# Patient Record
Sex: MALE | Race: WHITE | NOT HISPANIC OR LATINO | Employment: OTHER | URBAN - METROPOLITAN AREA
[De-identification: names, ages, dates, MRNs, and addresses within clinical notes are randomized per-mention and may not be internally consistent; named-entity substitution may affect disease eponyms.]

---

## 2022-01-06 ENCOUNTER — OUTSIDE PLACE OF SERVICE (OUTPATIENT)
Dept: FAMILY MEDICINE | Facility: CLINIC | Age: 57
End: 2022-01-06
Payer: OTHER GOVERNMENT

## 2022-01-06 PROCEDURE — 99305 PR NURSING FACILITY CARE, INIT, MOD SEVERITY: ICD-10-PCS | Mod: ,,, | Performed by: FAMILY MEDICINE

## 2022-01-06 PROCEDURE — 99305 1ST NF CARE MODERATE MDM 35: CPT | Mod: ,,, | Performed by: FAMILY MEDICINE

## 2022-02-03 ENCOUNTER — OUTSIDE PLACE OF SERVICE (OUTPATIENT)
Dept: FAMILY MEDICINE | Facility: CLINIC | Age: 57
End: 2022-02-03
Payer: OTHER GOVERNMENT

## 2022-02-03 PROCEDURE — 99308 SBSQ NF CARE LOW MDM 20: CPT | Mod: ,,, | Performed by: FAMILY MEDICINE

## 2022-02-03 PROCEDURE — 99308 PR NURSING FAC CARE, SUBSEQ, MINOR COMPLIC: ICD-10-PCS | Mod: ,,, | Performed by: FAMILY MEDICINE

## 2022-02-16 ENCOUNTER — OUTSIDE PLACE OF SERVICE (OUTPATIENT)
Dept: FAMILY MEDICINE | Facility: CLINIC | Age: 57
End: 2022-02-16
Payer: OTHER GOVERNMENT

## 2022-02-16 PROCEDURE — 99307 PR NURSING FAC CARE, SUBSEQ, IMPROVING: ICD-10-PCS | Mod: ,,, | Performed by: FAMILY MEDICINE

## 2022-02-16 PROCEDURE — 99307 SBSQ NF CARE SF MDM 10: CPT | Mod: ,,, | Performed by: FAMILY MEDICINE

## 2022-03-03 ENCOUNTER — OUTSIDE PLACE OF SERVICE (OUTPATIENT)
Dept: FAMILY MEDICINE | Facility: CLINIC | Age: 57
End: 2022-03-03
Payer: OTHER GOVERNMENT

## 2022-03-03 PROCEDURE — 99307 PR NURSING FAC CARE, SUBSEQ, IMPROVING: ICD-10-PCS | Mod: ,,, | Performed by: FAMILY MEDICINE

## 2022-03-03 PROCEDURE — 99307 SBSQ NF CARE SF MDM 10: CPT | Mod: ,,, | Performed by: FAMILY MEDICINE

## 2022-06-02 ENCOUNTER — OUTSIDE PLACE OF SERVICE (OUTPATIENT)
Dept: FAMILY MEDICINE | Facility: CLINIC | Age: 57
End: 2022-06-02
Payer: OTHER GOVERNMENT

## 2022-06-02 PROCEDURE — 99308 PR NURSING FAC CARE, SUBSEQ, MINOR COMPLIC: ICD-10-PCS | Mod: ,,, | Performed by: FAMILY MEDICINE

## 2022-06-02 PROCEDURE — 99308 SBSQ NF CARE LOW MDM 20: CPT | Mod: ,,, | Performed by: FAMILY MEDICINE

## 2022-06-07 ENCOUNTER — TELEPHONE (OUTPATIENT)
Dept: FAMILY MEDICINE | Facility: CLINIC | Age: 57
End: 2022-06-07
Payer: OTHER GOVERNMENT

## 2022-06-14 DIAGNOSIS — R47.01 APHASIA: ICD-10-CM

## 2022-06-14 DIAGNOSIS — I63.9 STROKE: Primary | ICD-10-CM

## 2022-06-20 ENCOUNTER — TELEPHONE (OUTPATIENT)
Dept: VASCULAR SURGERY | Facility: CLINIC | Age: 57
End: 2022-06-20
Payer: OTHER GOVERNMENT

## 2022-06-20 DIAGNOSIS — I65.23 BILATERAL CAROTID ARTERY STENOSIS: Primary | ICD-10-CM

## 2022-06-20 NOTE — TELEPHONE ENCOUNTER
Contacted patient's sister in reference to appt scheduled with Dr. Servin. [facility] patient had stroke in May and was found down in home four days after stroke. He needs visits with cardiology, vascular surgery, and vascular neurology. Appt scheduled for [facility], sister verified. Offered sooner availability. [facility] pt is currently in 's home in [place] and is supposed to be discharged this Friday. [facility] she would like to have patient at home for a period of time to rest and get re acclimated to his home before going to appts outside of the home, but will call for sooner appt if anything changes. [facility] patient has blockages in brain as well and needs to be seen for this. Explained that this is handled by vascular neurology and that nurse can transfer call to appropriate dept. Contact information given for vascular surgery clinic and call transferred.

## 2022-06-21 ENCOUNTER — TELEPHONE (OUTPATIENT)
Dept: NEUROLOGY | Facility: CLINIC | Age: 57
End: 2022-06-21
Payer: OTHER GOVERNMENT

## 2022-06-21 NOTE — TELEPHONE ENCOUNTER
----- Message from Karolyn Lopez sent at 6/21/2022  9:31 AM CDT -----  Contact: Elena GARCIA Pt's sisters requesting a call back regarding scheduling... Would like to be seen July 11th if possible.      Confirmed contact info below:  Contact Name: Adarsh JIGNESH Wheeler  Phone Number: [phone]

## 2022-06-22 ENCOUNTER — TELEPHONE (OUTPATIENT)
Dept: NEUROLOGY | Facility: CLINIC | Age: 57
End: 2022-06-22

## 2022-06-22 NOTE — TELEPHONE ENCOUNTER
Appt booked 07/11/2022 with Dr. Mendiola because sister requested same day appts as other appts (Had taking off work, she's paying his bills and hers).

## 2022-06-22 NOTE — TELEPHONE ENCOUNTER
----- Message from Miladys Barbosa MA sent at 6/20/2022  5:01 PM CDT -----  Contact: Elena (sister) [phone]    ----- Message -----  From: Augusta Mckeon  Sent: 6/20/2022   4:46 PM CDT  To: , #    Caller (Elena-sister) requesting a call back to schedule her brother an appt with Dr. Ignacio per Dr. Servin request.  Pt had a massive stroke on Dec 8, 2022.  Please call to discuss further.

## 2022-07-11 ENCOUNTER — OFFICE VISIT (OUTPATIENT)
Dept: NEUROLOGY | Facility: CLINIC | Age: 57
End: 2022-07-11
Payer: OTHER GOVERNMENT

## 2022-07-11 ENCOUNTER — OFFICE VISIT (OUTPATIENT)
Dept: INTERNAL MEDICINE | Facility: CLINIC | Age: 57
End: 2022-07-11
Payer: OTHER GOVERNMENT

## 2022-07-11 VITALS — HEART RATE: 73 BPM | WEIGHT: 180.75 LBS | DIASTOLIC BLOOD PRESSURE: 76 MMHG | SYSTOLIC BLOOD PRESSURE: 130 MMHG

## 2022-07-11 VITALS
WEIGHT: 181 LBS | BODY MASS INDEX: 27.43 KG/M2 | TEMPERATURE: 98 F | HEIGHT: 68 IN | RESPIRATION RATE: 18 BRPM | HEART RATE: 77 BPM | DIASTOLIC BLOOD PRESSURE: 80 MMHG | SYSTOLIC BLOOD PRESSURE: 118 MMHG | OXYGEN SATURATION: 95 %

## 2022-07-11 DIAGNOSIS — Z86.73 HISTORY OF STROKE: Primary | ICD-10-CM

## 2022-07-11 DIAGNOSIS — M79.2 NEUROPATHIC PAIN: ICD-10-CM

## 2022-07-11 DIAGNOSIS — E78.2 MIXED HYPERLIPIDEMIA: ICD-10-CM

## 2022-07-11 DIAGNOSIS — I63.9 LEFT-SIDED CEREBROVASCULAR ACCIDENT (CVA): Primary | ICD-10-CM

## 2022-07-11 DIAGNOSIS — Z13.6 ENCOUNTER FOR SCREENING FOR CARDIOVASCULAR DISORDERS: ICD-10-CM

## 2022-07-11 DIAGNOSIS — R53.1 RIGHT SIDED WEAKNESS: ICD-10-CM

## 2022-07-11 DIAGNOSIS — I11.9 HYPERTENSIVE HEART DISEASE WITHOUT HEART FAILURE: ICD-10-CM

## 2022-07-11 DIAGNOSIS — I63.032: ICD-10-CM

## 2022-07-11 PROBLEM — I10 BENIGN ESSENTIAL HYPERTENSION: Status: ACTIVE | Noted: 2022-07-11

## 2022-07-11 PROCEDURE — 99214 PR OFFICE/OUTPT VISIT, EST, LEVL IV, 30-39 MIN: ICD-10-PCS | Mod: S$PBB,,, | Performed by: INTERNAL MEDICINE

## 2022-07-11 PROCEDURE — 99214 OFFICE O/P EST MOD 30 MIN: CPT | Mod: PBBFAC | Performed by: INTERNAL MEDICINE

## 2022-07-11 PROCEDURE — 99999 PR PBB SHADOW E&M-EST. PATIENT-LVL IV: CPT | Mod: PBBFAC,,, | Performed by: INTERNAL MEDICINE

## 2022-07-11 PROCEDURE — 99999 PR PBB SHADOW E&M-EST. PATIENT-LVL III: CPT | Mod: PBBFAC,,, | Performed by: STUDENT IN AN ORGANIZED HEALTH CARE EDUCATION/TRAINING PROGRAM

## 2022-07-11 PROCEDURE — 99214 OFFICE O/P EST MOD 30 MIN: CPT | Mod: S$PBB,,, | Performed by: INTERNAL MEDICINE

## 2022-07-11 PROCEDURE — 99204 PR OFFICE/OUTPT VISIT, NEW, LEVL IV, 45-59 MIN: ICD-10-PCS | Mod: S$PBB,,, | Performed by: STUDENT IN AN ORGANIZED HEALTH CARE EDUCATION/TRAINING PROGRAM

## 2022-07-11 PROCEDURE — 99999 PR PBB SHADOW E&M-EST. PATIENT-LVL III: ICD-10-PCS | Mod: PBBFAC,,, | Performed by: STUDENT IN AN ORGANIZED HEALTH CARE EDUCATION/TRAINING PROGRAM

## 2022-07-11 PROCEDURE — 99204 OFFICE O/P NEW MOD 45 MIN: CPT | Mod: S$PBB,,, | Performed by: STUDENT IN AN ORGANIZED HEALTH CARE EDUCATION/TRAINING PROGRAM

## 2022-07-11 PROCEDURE — 99999 PR PBB SHADOW E&M-EST. PATIENT-LVL IV: ICD-10-PCS | Mod: PBBFAC,,, | Performed by: INTERNAL MEDICINE

## 2022-07-11 PROCEDURE — 99213 OFFICE O/P EST LOW 20 MIN: CPT | Mod: PBBFAC,27 | Performed by: STUDENT IN AN ORGANIZED HEALTH CARE EDUCATION/TRAINING PROGRAM

## 2022-07-11 RX ORDER — DULOXETIN HYDROCHLORIDE 30 MG/1
30 CAPSULE, DELAYED RELEASE ORAL DAILY
Qty: 90 CAPSULE | Refills: 3 | Status: SHIPPED | OUTPATIENT
Start: 2022-07-11 | End: 2022-07-18 | Stop reason: SDUPTHER

## 2022-07-11 RX ORDER — ONDANSETRON 4 MG/1
1 TABLET, FILM COATED ORAL EVERY 6 HOURS PRN
COMMUNITY
Start: 2022-07-08 | End: 2024-03-06

## 2022-07-11 RX ORDER — ROSUVASTATIN CALCIUM 40 MG/1
1 TABLET, COATED ORAL DAILY
COMMUNITY
Start: 2022-07-07 | End: 2022-07-18 | Stop reason: SDUPTHER

## 2022-07-11 RX ORDER — DULOXETIN HYDROCHLORIDE 30 MG/1
1 CAPSULE, DELAYED RELEASE ORAL DAILY
COMMUNITY
Start: 2022-07-07 | End: 2022-07-11 | Stop reason: SDUPTHER

## 2022-07-11 RX ORDER — LISINOPRIL 2.5 MG/1
2.5 TABLET ORAL
COMMUNITY
Start: 2022-07-07 | End: 2022-07-18 | Stop reason: SDUPTHER

## 2022-07-11 RX ORDER — POLYETHYLENE GLYCOL 3350 17 G/17G
POWDER, FOR SOLUTION ORAL
COMMUNITY
Start: 2022-07-07 | End: 2024-03-06

## 2022-07-11 RX ORDER — ASPIRIN 325 MG
TABLET ORAL
COMMUNITY
Start: 2022-05-31 | End: 2022-07-11

## 2022-07-11 RX ORDER — ACETAMINOPHEN 325 MG/1
650 TABLET ORAL
COMMUNITY
Start: 2022-01-04 | End: 2024-03-06

## 2022-07-11 RX ORDER — CLOPIDOGREL BISULFATE 75 MG/1
75 TABLET ORAL ONCE
Qty: 90 TABLET | Refills: 0 | Status: ON HOLD | OUTPATIENT
Start: 2022-07-11 | End: 2022-08-27 | Stop reason: HOSPADM

## 2022-07-11 RX ORDER — SERTRALINE HYDROCHLORIDE 50 MG/1
25 TABLET, FILM COATED ORAL
COMMUNITY
Start: 2022-07-07 | End: 2022-07-18

## 2022-07-11 RX ORDER — TRAZODONE HYDROCHLORIDE 100 MG/1
0.5 TABLET ORAL NIGHTLY PRN
COMMUNITY
Start: 2022-07-07 | End: 2023-07-11

## 2022-07-11 RX ORDER — LACTULOSE 10 G/15ML
20 SOLUTION ORAL; RECTAL
COMMUNITY
Start: 2022-01-04 | End: 2024-03-06

## 2022-07-11 RX ORDER — CLOPIDOGREL BISULFATE 75 MG/1
TABLET ORAL
COMMUNITY
Start: 2022-05-31 | End: 2022-07-11 | Stop reason: SDUPTHER

## 2022-07-11 NOTE — PROGRESS NOTES
Vascular Neurology Clinic  Initial Consult Clinic Visit    Patient Name: Adarsh Wheeler  MRN: [id number]  Date: 7/11/22  Referring Provider: Aaareferral Self    CC: Ischemic stroke    SUBJECTIVE:      History of Present Illness: Adarsh Wheeler is a 56 y.o. male with a past medical history significant for HTN, HLD, prior RUE injury (IED injury, ), prior tobacco use who presents to clinic for follow-up for a recent admission for stroke.     He was admitted to [facility] on 12/8/2021 after presenting with right-hemiparesis and aphasia. He was last known well 4 days when he developed these symptoms. He laid down on the floor for 4 days before being found on the ground by his family. [facility] and MRI reportedly showed a subacute left MCA territory infarction. He was not a candidate for acute intervention given last known well 4 days prior to presentation. MRA showed a left internal carotid artery occlusion. TTE with EF 60-65% and no evidence of intracardiac shunt. Hospital course complicated by hyponatremia and neurogenic bladder/bowel.       [facility] concerning for possible extension vs new subacute infarction around the area of his prior stroke and concern for high-grade proximal and distal stenosis in the left ICA, carotid dopplers show b/l 50-69% stenosis. These images are not available for review (findings could represent T2 shine-through as opposed to new infarction), but they provide the written reports for these studies (listed below). They deny new or worsening neurologic symptoms related to these imaging findings.     Today, he reports pain along the right side of his neck and trapezius, states it feels muscular. He was started on cymbalta with limited benefit for this pain. He completed a rehabilitation program after his admission for stroke, but will restart PT/OT next week.    Etiology: Left MCA territory infarction with left carotid occlusion  per imaging reports, etiology appears to be large artery atherosclerosis, though a cardioembolic source is not excluded  Intervention: None, presented outside of the therapeutic window for IV thrombolysis or EVT    Work-up:    Imaging:  - MRI brain without contrast (12/8/2021): Subacute left middle cerebral artery territory infarction in the setting of suspected occluded left internal carotid artery.  - MRI brain without contrast (05/31/2022): Constellation of findings are consistent with subacute ischemic infarct along the margins of focal area of chronic volume loss from chronic vascular insult projecting about the left basal ganglia, left corona radiata, posterior left frontal lobe with more focal areas of sequela of chronic vascular insult within the subjacent posterior left frontal lobe, left mid and posterior basal ganglia/corona radiata, superior aspect of the left temporal lobe with subadjacent gliosis and subjacent areas of chronic petechial hemorrhage.   --- No MR evidence of acute intracranial hemorrhage or midline shift.  --- Loss of flow void signal identified throughout the horizontal portion of the left internal carotid artery at the skull base extending to the left laceral segment and cavernous and supraclinoid left ICA, which may reflect high-grade stenosis, occlusion.    - MRA brain without contrast (12/8/2021): Occlusion of the left ICA extending from the visualized distal extracranial ICA up to the level of the carotid terminus as discussed above. Diminished flow signal identified within the left middle cerebral artery vasculature.  - MRA brain without contrast (05/31/2022): High-grade stenosis with loss of normal flow signal throughout the horizontal portion of the skull base left internal carotid artery extending to involve the cavernous sinus and supraclinoid segment of the left ICA. No other significant hemodynamic stenosis, vascular malformation, aneurysmal dilatation throughout the anterior  and posterior intracranial arterial circulation.   - MRA carotids (2022):  High-grade stenosis identified throughout the horizontal portion of the left internal carotid artery at the skull base extending to the left laceral segment and cavernous and supraclinoid left ICA.  Suggestion of moderate to severe high-grade stenosis of the proximal right extracranial ICA. Suggestion of moderate to high-grade stenosis about the proximal left extracranial ICA.  -- Carotid U/S (2022): Findings compatible with 50-69% stenosis of the bilateral proximal/mid ICAs as detailed above. Markedly decreased EDV within the distal left internal carotid artery noted.     Cardiac Evaluation:  - TTE (2021): EF 60-65%, no LV wall motion abnormalities, no left atrial enlargement, no evidence of intracardiac shunt    Review of Systems: The following systems were reviewed with pertinent positives and negatives documented in the HPI: constitutional, eyes, CV, respiratory, GI, , musculoskeletal, skin, neurological, psychiatric    Past Medical History:  HTN  HLD  Stroke    Medications:  Any other notable medications as documented in HPI.  Aspirin 81  Crestor 40  Cymbalta 30  Lisinopril 2.5  Plavix 75  Trazodone 50  Zoloft 25    Allergies:  Review of patient's allergies indicates:  Not on File    Social History:  Social History     Socioeconomic History    Marital status: Single   Tobacco Use    Smoking status: Former Smoker     Types: Pipe     Quit date: 2014     Years since quittin.5    Smokeless tobacco: Never Used   Substance and Sexual Activity    Alcohol use: Not Currently    Drug use: Never    Sexual activity: Not Currently     Birth control/protection: None     Any other notable Social History as documented in HPI.    Family History:  Any other notable FMH as documented in HPI.  Mother - TIAs  mAunt - stroke  mUncle - stroke    OBJECTIVE:     Physical Exam:   Vitals:    22 1301   BP: 130/76   Pulse: 73      GEN: NAD, pleasant, cooperative  CV: RRR  PULM: No signs of resp distress, on room air  ABD: Soft, non-tender to palpation    NEURO:  Mental status: The patient is alert, attentive, and oriented to self, age, month, year  Speech: Dysfluent speech with delayed verbal response, mild dysarthria, repetition intact, mild impairment with naming/reading    Cranial nerves:  CN II: Visual fields are full to confrontation. Pupils are 4mm and reactive to light OU.  CN III, IV, VI: EOMI, no nystagmus, no ptosis  CN V: Facial sensation is intact in all 3 divisions bilaterally.  CN VII: Mild right facial weakness  CN VIII: Hearing is normal bilaterally  CN IX, X: Palate elevates symmetrically.   CN XI: Head turning and shoulder shrug are intact  CN XII: Tongue is midline with normal movements and no atrophy.    Motor: Muscle bulk and tone are normal. No pronator drift.   Right Left   Right  Left   Deltoid 4+/5 5/5  Hip Flexion 4+/5 5/5   Biceps 4+/5 5/5  Hip Extension 5/5 5/5   Triceps 4+/5 5/5  Knee Flexion 5/5 5/5   Wrist Ext 4/5 5/5  Knee Extension 5/5 5/5   Finger Abd 5/5 5/5  Ankle dorsiflex 5/5 5/5       Ankle plantar flex 5/5 5/5     Reflexes: Brisk reflexes right patellar, biceps without spread. Reflexes are 2+ and symmetric at the biceps, brachioradialis, patellar. Plantar responses are flexor.    Sensory: Light touch, temperature sense are intact in bilateral upper and lower extremities.    Coordination: Rapid alternating movements and fine finger movements are intact. There is no dysmetria on finger-to-nose and heel-knee-shin. There are no abnormal or extraneous movements.    Gait/Stance: Posture is normal. Hemiparetic gait.      ASSESSMENT/PLAN:     Diagnosis/Etiology: Left MCA territory infarction, etiology large artery atherosclerosis versus embolic stroke of undetermined source  Stroke Risk Factors: HTN, HLD  Effects of Stroke: Mild fluent aphasia, right hemiparesis    Recommendations:   - Additional studies:  CTA head/neck (ordered) to more closely evaluate intra- and extracranial vasculature (prior records including MRI/MRA are not available for review; however, reports are available). Recommend carotid U/S to evaluate carotid stenosis with follow-up with vascular surgery. 30-day cardiac monitor is also ordered to complete stroke work-up.   - Antiplatelet/Anticoagulation: Continue ASA 81mg and plavix 75mg daily.   - Lipid Management: Target is LDL < 70mg/dL. Continue crestor 40.   - Diabetes: Target hemoglobin A1c <7%, measured 2X/year or quarterly if not meeting goals. No known history of DM.  - Hypertension: Target systolic BP <140mmHg, avoid hypotension given concern for carotid stenosis. At goal today. Continue home BP medications.  - Smoking: H/o tobacco use. None currently.  - Therapy: Continue aggressive PT/OT  - RTC in 6 months      Problem List Items Addressed This Visit        Neuro    History of stroke - Primary    Relevant Orders    CTA Head and Neck (xpd)    Cardiac event monitor      Other Visit Diagnoses     Encounter for screening for cardiovascular disorders        Relevant Orders    CTA Head and Neck (xpd)    Stroke due to thrombosis of left carotid artery        Relevant Orders    CTA Head and Neck (xpd)    Cardiac event monitor          Melba Mendiola MD, PhD  [facility]  Department of Vascular Neurology

## 2022-07-11 NOTE — PROGRESS NOTES
[facility] Note    Chief Complaint      Chief Complaint   Patient presents with    Establish Care       History of Present Illness      Adarsh Wheeler is a 56 y.o. male who presents today for   Chief Complaint   Patient presents with    [facility] Care   .  Patient comes to appointment here for establish visit . [facility] . Just  Had visit with vascular neurology dr villarreal and corinna . [facility] will get report he self reports 3 polyps . He thinks was about 2 years ago. He is having some mild expressive aphasia from stroke but is working with speech therapy . We will reduce zoloft to 25 mg cont cymbalta 30 mg . In one month will increase cymbalta to0 60 mg and stop zoloft completely     HPI    No problem-specific Assessment & Plan notes found for this encounter.       Problem List Items Addressed This Visit        Neuro    Left-sided cerebrovascular accident (CVA) - Primary    Overview     [facility]gery /vascular now managing will defer to them for further workup and treatment               Cardiac/Vascular    Mixed hyperlipidemia    Overview     Cont crestor 40 mg           Hypertensive heart disease without heart failure    Overview     bp well controlled on current regimen               Other    Right sided weakness    Overview     Is ambulating with no assistance . [facility] . [facility]                       Other Visit Diagnoses     Neuropathic pain               Past Medical History:  History reviewed. No pertinent past medical history.    Past Surgical History:  Past Surgical History:   Procedure Laterality Date    EYE SURGERY      FOOT SURGERY      HAND SURGERY         Family History:  family history includes COPD in his father; Cancer in his sister; Stroke in his maternal uncle.     Social History:  Social History     Socioeconomic History     Marital status: Single   Tobacco Use    Smoking status: Former Smoker     Types: Pipe     Quit date: 2014     Years since quittin.5    Smokeless tobacco: Never Used   Substance and Sexual Activity    Alcohol use: Not Currently    Drug use: Never    Sexual activity: Not Currently     Birth control/protection: None       Review of Systems:   Review of Systems   Constitutional: Negative for fever and weight loss.   HENT: Negative for congestion, hearing loss and sore throat.    Eyes: Negative for blurred vision.   Respiratory: Negative for cough and shortness of breath.    Cardiovascular: Negative for chest pain, palpitations, claudication and leg swelling.   Gastrointestinal: Negative for abdominal pain, constipation, diarrhea and heartburn.   Genitourinary: Negative for dysuria.   Musculoskeletal: Negative for back pain and myalgias.   Skin: Negative for rash.   Neurological: Positive for tingling, speech change and focal weakness. Negative for headaches.   Psychiatric/Behavioral: Negative for depression and suicidal ideas. The patient is not nervous/anxious.         Medications:  Outpatient Encounter Medications as of 2022   Medication Sig Dispense Refill    acetaminophen (TYLENOL) 325 MG tablet Take 650 mg by mouth.      aspirin (ASPIR-81 ORAL) Take by mouth.      lactulose (CHRONULAC) 10 gram/15 mL solution Take 20 g by mouth.      lisinopriL (PRINIVIL,ZESTRIL) 2.5 MG tablet Take 2.5 mg by mouth.      MELATONIN ORAL Take by mouth.      ondansetron (ZOFRAN) 4 MG tablet Take 1 tablet by mouth every 6 (six) hours as needed.      polyethylene glycol (GLYCOLAX) 17 gram/dose powder Take by mouth.      rosuvastatin (CRESTOR) 40 MG Tab Take 1 tablet by mouth once daily.      sertraline (ZOLOFT) 50 MG tablet Take 25 mg by mouth.      traZODone (DESYREL) 100 MG tablet Take 0.5 tablets by mouth nightly as needed.      [DISCONTINUED] clopidogreL (PLAVIX) 75 mg tablet TAKE ONE TABLET BY MOUTH  "TWICE A DAY TO PREVENT BLOOD CLOTS      [DISCONTINUED] DULoxetine (CYMBALTA) 30 MG capsule Take 1 capsule by mouth once daily.      clopidogreL (PLAVIX) 75 mg tablet Take 1 tablet (75 mg total) by mouth once. for 1 dose 90 tablet 0    DULoxetine (CYMBALTA) 30 MG capsule Take 1 capsule (30 mg total) by mouth once daily. 90 capsule 3    [DISCONTINUED] aspirin 325 MG tablet TAKE ONE TABLET BY MOUTH EVERY DAY TO PREVENT BLOOD CLOT       No facility-administered encounter medications on file as of 7/11/2022.       Allergies:  Review of patient's allergies indicates:   Allergen Reactions    Shellfish containing products Swelling         Physical Exam      Vitals:    07/11/22 1457   BP: 118/80   Pulse: 77   Resp: 18   Temp: 97.8 °F (36.6 °C)        Vital Signs  Temp: 97.8 °F (36.6 °C)  Temp src: Oral  Pulse: 77  Resp: 18  SpO2: 95 %  BP: 118/80  BP Location: Right arm  Patient Position: Sitting  Pain Score: 0-No pain  Height and Weight  Height: 5' 8" (172.7 cm)  Weight: 82.1 kg (181 lb)  BSA (Calculated - sq m): 1.98 sq meters  BMI (Calculated): 27.5  Weight in (lb) to have BMI = 25: 164.1]     Body mass index is 27.52 kg/m².    Physical Exam  Constitutional:       Appearance: He is well-developed.   HENT:      Head: Normocephalic.   Eyes:      Pupils: Pupils are equal, round, and reactive to light.   Neck:      Thyroid: No thyromegaly.   Cardiovascular:      Rate and Rhythm: Normal rate and regular rhythm.      Heart sounds: No murmur heard.    No friction rub. No gallop.   Pulmonary:      Effort: Pulmonary effort is normal.      Breath sounds: Normal breath sounds.   Abdominal:      General: Bowel sounds are normal.      Palpations: Abdomen is soft.   Musculoskeletal:         General: Normal range of motion.      Right shoulder: Normal range of motion.      Right upper arm: Tenderness present.      Cervical back: Normal range of motion.   Skin:     General: Skin is warm and dry.   Neurological:      Mental Status: " He is alert and oriented to person, place, and time.      Sensory: Sensation is intact. No sensory deficit.   Psychiatric:         Behavior: Behavior normal.          Laboratory:  CBC:  No results for input(s): WBC, RBC, HGB, HCT, PLT, MCV, MCH, MCHC in the last 2160 hours.  CMP:  No results for input(s): GLU, CALCIUM, ALBUMIN, PROT, NA, K, CO2, CL, BUN, ALKPHOS, ALT, AST, BILITOT in the last 2160 hours.    Invalid input(s): CREATININ  URINALYSIS:  No results for input(s): COLORU, CLARITYU, SPECGRAV, PHUR, PROTEINUA, GLUCOSEU, BILIRUBINCON, BLOODU, WBCU, RBCU, BACTERIA, MUCUS, NITRITE, LEUKOCYTESUR, UROBILINOGEN, HYALINECASTS in the last 2160 hours.   LIPIDS:  No results for input(s): TSH, HDL, CHOL, TRIG, LDLCALC, CHOLHDL, NONHDLCHOL, TOTALCHOLEST in the last 2160 hours.  TSH:  No results for input(s): TSH in the last 2160 hours.  A1C:  No results for input(s): HGBA1C in the last 2160 hours.    Radiology:        Assessment:     Adarsh Wheeler is a 56 y.o.male with:    Left-sided cerebrovascular accident (CVA)  -     clopidogreL (PLAVIX) 75 mg tablet; Take 1 tablet (75 mg total) by mouth once. for 1 dose  Dispense: 90 tablet; Refill: 0    Right sided weakness    Mixed hyperlipidemia    Hypertensive heart disease without heart failure    Neuropathic pain  -     DULoxetine (CYMBALTA) 30 MG capsule; Take 1 capsule (30 mg total) by mouth once daily.  Dispense: 90 capsule; Refill: 3                Plan:     Problem List Items Addressed This Visit        Neuro    Left-sided cerebrovascular accident (CVA) - Primary    Overview     [facility] /vascular now managing will defer to them for further workup and treatment               Cardiac/Vascular    Mixed hyperlipidemia    Overview     Cont crestor 40 mg           Hypertensive heart disease without heart failure    Overview     bp well controlled on current regimen               Other    Right sided weakness    Overview     Is ambulating with no assistance . [facility] . [facility]                       Other Visit Diagnoses     Neuropathic pain              As above, continue current medications and maintain follow up with specialists.  Return to clinic in 6  months.      Frederick W Dantagnan [facility]

## 2022-07-11 NOTE — PATIENT INSTRUCTIONS
- Continue aspirin 81mg and plavix 75mg daily  - Recommend CTA head/neck to evaluate intra- and extra- cranial vasculature  - Recommend 30-day cardiac monitor to complete stroke work-up

## 2022-07-12 ENCOUNTER — CLINICAL SUPPORT (OUTPATIENT)
Dept: CARDIOLOGY | Facility: HOSPITAL | Age: 57
End: 2022-07-12
Attending: STUDENT IN AN ORGANIZED HEALTH CARE EDUCATION/TRAINING PROGRAM
Payer: OTHER GOVERNMENT

## 2022-07-12 DIAGNOSIS — I63.032: ICD-10-CM

## 2022-07-12 DIAGNOSIS — Z86.73 HISTORY OF STROKE: ICD-10-CM

## 2022-07-18 ENCOUNTER — CLINICAL SUPPORT (OUTPATIENT)
Dept: REHABILITATION | Facility: HOSPITAL | Age: 57
End: 2022-07-18
Payer: OTHER GOVERNMENT

## 2022-07-18 ENCOUNTER — PATIENT MESSAGE (OUTPATIENT)
Dept: NEUROLOGY | Facility: CLINIC | Age: 57
End: 2022-07-18
Payer: OTHER GOVERNMENT

## 2022-07-18 ENCOUNTER — PATIENT MESSAGE (OUTPATIENT)
Dept: INTERNAL MEDICINE | Facility: CLINIC | Age: 57
End: 2022-07-18
Payer: OTHER GOVERNMENT

## 2022-07-18 DIAGNOSIS — R47.01 BROCA'S APHASIA: Primary | ICD-10-CM

## 2022-07-18 DIAGNOSIS — R27.8 COORDINATION IMPAIRMENT: ICD-10-CM

## 2022-07-18 DIAGNOSIS — R26.9 GAIT DIFFICULTY: ICD-10-CM

## 2022-07-18 DIAGNOSIS — R29.898 WEAKNESS OF RIGHT LOWER EXTREMITY: ICD-10-CM

## 2022-07-18 DIAGNOSIS — M79.2 NEUROPATHIC PAIN: ICD-10-CM

## 2022-07-18 DIAGNOSIS — R48.2 APRAXIA OF SPEECH: ICD-10-CM

## 2022-07-18 PROCEDURE — 92523 SPEECH SOUND LANG COMPREHEN: CPT | Mod: PN

## 2022-07-18 PROCEDURE — 97161 PT EVAL LOW COMPLEX 20 MIN: CPT | Mod: PN

## 2022-07-18 RX ORDER — DULOXETIN HYDROCHLORIDE 30 MG/1
30 CAPSULE, DELAYED RELEASE ORAL DAILY
Qty: 90 CAPSULE | Refills: 3 | Status: SHIPPED | OUTPATIENT
Start: 2022-07-18 | End: 2023-07-11

## 2022-07-18 RX ORDER — LISINOPRIL 2.5 MG/1
2.5 TABLET ORAL DAILY
Qty: 90 TABLET | Refills: 3 | Status: SHIPPED | OUTPATIENT
Start: 2022-07-18

## 2022-07-18 RX ORDER — ROSUVASTATIN CALCIUM 40 MG/1
40 TABLET, COATED ORAL DAILY
Qty: 90 TABLET | Refills: 3 | Status: SHIPPED | OUTPATIENT
Start: 2022-07-18

## 2022-07-18 NOTE — PLAN OF CARE
[facility]  Physical Therapy Neurological Rehabilitation Initial Evaluation    Name: Adarsh Wheeler  Clinic Number: [id number]    Therapy Diagnosis:   Encounter Diagnoses   Name Primary?    Weakness of right lower extremity     Coordination impairment     Gait difficulty      Physician: Kush Serrano MD    Physician Orders: PT Eval and Treat   Medical Diagnosis from Referral: I63.322 (ICD-10-CM) - Cerebral infarction due to thrombosis of left anterior cerebral artery  Evaluation Date: 7/18/2022  Authorization Period Expiration: 06/15/2023  Plan of Care Expiration: 9/16/22  Visit # / Visits authorized: 1/ 15    Time In: 12:40 PM   Time Out: 1:12 PM  Total Billable Time: 32 minutes    Precautions: Standard and expressive aphasia     Subjective   Date of onset: December 2021   History of current condition - iLam reports on December 8th, he woke up and the R side of his body was weak. Pt states that he was informed by the physician that he had a light stroke. He reports that he attended inpatient therapy at [facility] for one month and [facility] Pt states that he has attended OP PT since January 8th. He reports that he enjoys the intensity of OP PT. Pt also states that he was an avid walker and runner prior to stroke and would like to return to those activities without difficulty. He reports that he has a weak RUE and constant R neck pain, but is unaware of what activities make the pain better or worse. Pt states that his sister is currently assisting him with medications and doctor's appointments.        Medical History:   No past medical history on file.    Surgical History:   Adarsh Wheeler  has a past surgical history that includes Eye surgery; Foot surgery; and Hand surgery.    Medications:   Adarsh EGAN III has a current medication list which includes the following prescription(s): acetaminophen, aspirin, clopidogrel,  duloxetine, lactulose, lisinopril, melatonin, ondansetron, polyethylene glycol, rosuvastatin, sertraline, and trazodone.    Allergies:   Review of patient's allergies indicates:   Allergen Reactions    Shellfish containing products Swelling        Imaging, none:     Prior Therapy: [facility]; [facility]   Social History: lives alone  Falls: no  DME: no    Home Environment: single story home   Exercise Routine / History: Walking   Family Present at time of Eval: Sister and Brother-in-law in waiting room    Occupation: n/a  Prior Level of Function: independent   Current Level of Function: modified independent     Pain: Right side of neck and upper trapezius   Current 4/10, worst 4/10, best 4/10   Location: right neck  and shoulder   Description: Throbbing, Grabbing and Tight  Aggravating Factors: could not describe   Easing Factors: rest and pain medication    Patient's goals: Pt states that he would like to return to walking and running without difficulty and to improve RUE function.       Objective   - Follows commands: 100% of time   - Speech: no deficits expressive aphasia; difficulty with word finding     Mental status: alert, oriented to person, place, and time  Appearance: Casually dressed  Behavior:  calm  Attention Span and Concentration:  Normal    Dominant hand:  right       Sensation: Light Touch: can state which side is being touched; unable to describe area secondary to speech difficulty           Proprioception: Intact,            Coordination:   - fine motor: impaired on the Right   - UE coordination: impaired on the Right   - LE coordination:  Able to alternate heel and toe taps unable to alternate B feet tapping    ROM:     CERVICAL SPINE SCREEN  Flexion WNL degrees (80-90 deg)  Extension Limited   L side bend Limited, R side bend Limited   L rotation Limited,  R rotation Limited   Are concurrent symptoms present with any of these movements no increase in R neck pain noted        UPPER EXTREMITY--AROM/PROM  (R) UE: limited as follows: limited in all planes   (L) UE: WFLs           RANGE OF MOTION--LOWER EXTREMITIES  (R) LE WFLs  (L) LE:WFLs    Strength: manual muscle test grades below   Upper Extremity Strength   RUE                                                          LUE   Flexion      4-/5     Flexion     4+/5   Abduction 3+/5    Abduction 4+5   Extension 4/5     Extension 4+/5  ER      3+/5    ER            4/+/5  IR       3+/5    IR              4+/5     Strength -- decreased on the R       Lower Extremity Strength  Right LE  Left LE    Hip Flexion: 4-/5 Hip Flexion: 4+/5   Hip Abduction: 4-/5 Hip Abduction: 4/5   Knee Extension: 4/5 Knee Extension: 4+/5   Knee Flexion: 4+/5 Knee Flexion: 5/5   Ankle Dorsiflexion: 4/5 Ankle Dorsiflexion: 4+/5   Ankle Plantarflexion: 4/5 Ankle Plantarflexion: 5/5       Functional Gait Assessment or BETANCUR or HIMAT     Dynamic Gait Index  1. Gait on level surface: 2. Mild impairment: Walks 20', uses assistive devices, slower speed, mild gait deviations.   2. Change in Gait speed: 2. Mild impairment: Is able to change speed, but demonstrates mild gait deviations, or no gait deviations but unable to achieve a signifcant change in velocity, or uses an assistive device.  3. Gait with Horizontal Head Turns: 2. Mild impairment: performs head turns smoothly with slight change in gait velocity, i.e. minor distuption to smooth gait path or uses walking aid.   4. Gait with Vertical Head Turns: 2. Mild impairment: Perform task with slight change in gait velocity ie minor disruption in smooth gait path or uses walking aid  5. Gait and Pivot turn: 3. Normal: Pivot turns safely within 3 seconds and stops quickly with no loss of balance  6. Step Over Obstacle: 2. Mild impairment: Is able to step over box, but must slow down and adjust steps to clear box safely.  7. Step around obstacles: 2. Mild impairment: Is able to step around both cones, but must slow  down and adjust steps to clear cones.  8. Steps: 3. Normal: Alternating feet, no rail    Score: 18/24    Interpretation: < 19/24 = predictive of falls in the elderly  > 22/24 = safe ambulators        GAIT DEVIATIONS:  Adarsh EGAN III displays the following deviations with ambulation: decreased arm swing on the R, slight vaulting gait secondary to maintaining RLE is extension, decreased R knee flexion       Impairments contributing to deviations: impaired motor control, impaired coordination       TREATMENT     Total Treatment time separate from Evaluation: 00 minutes    Liam received therapeutic exercises to develop strength, endurance, ROM, flexibility, posture and core stabilization for 00 minutes including:  Not Today    Liam participated in neuromuscular re-education activities to improve: Balance, Coordination, Kinesthetic, Sense, Proprioception and Posture for 00 minutes. The following activities were included:  Not Today    Liam participated in gait training to improve functional mobility and safety for 00  minutes, including:  Not Today       Home Exercises and Patient Education Provided    Education provided:   - n/a    Written Home Exercises Provided: No. Will provide HEP during follow up visits.       Assessment   Adarsh EGAN III is a 56 y.o. male referred to outpatient Physical Therapy with a medical diagnosis of I63.322 (ICD-10-CM) - Cerebral infarction due to thrombosis of left anterior cerebral artery. Patient presents with decreased RLE strength, decreased coordination, gait difficulty, and a decrease in overall functional mobility secondary to these impairments. Pt would benefit from skilled therapy to address isolation of RLE movements, progressive strengthening, and higher level balance tasks to assist with return to recreational activity. He also presents with signs of expressive aphasia and difficulty with functional mobility of RUE. Pt demonstrates limited RUE strength and ROM in all  planes, and complains of constant right neck pain that would benefit from skilled occupational therapy evaluation and treatment.     Patient prognosis is Fair.   Patient will benefit from skilled outpatient Physical Therapy to address the deficits stated above and in the chart below, provide patient/family education, and to maximize patient's level of independence.     Plan of care discussed with patient: Yes  Patient's spiritual, cultural and educational needs considered and patient is agreeable to the plan of care and goals as stated below:     Anticipated Barriers for therapy: Distance to clinic     Medical Necessity is demonstrated by the following  History  Co-morbidities and personal factors that may impact the plan of care Co-morbidities:   none    Personal Factors:   no deficits     low   Examination  Body Structures and Functions, activity limitations and participation restrictions that may impact the plan of care Body Regions:   neck  lower extremities  upper extremities    Body Systems:    ROM  strength  gross coordinated movement  balance  gait  motor control    Participation Restrictions:   Unable to participate in recreational activity     Activity limitations:   Learning and applying knowledge  no deficits    General Tasks and Commands  no deficits    Communication  expressive aphasia    Mobility  lifting and carrying objects  fine hand use (grasping/picking up)  walking    Self care  looking after one's health    Domestic Life  no deficits    Interactions/Relationships  no deficits    Life Areas  employment    Community and Social Life  community life  recreation and leisure         high   Clinical Presentation stable and uncomplicated low   Decision Making/ Complexity Score: low     Goals:  Short Term Goals: 4 weeks   1. Patient will be demonstrate understanding and be compliant with HEP in order to maximize PT benefits  2. Patient will improve right lower extremity MMT grades by >/=1/2 grade in  order to improve strength for ADL completion  3. Patient will complete FOTO limitation survey in order to establish baseline of self-perceived functional mobility deficits     Long Term Goals: 8 weeks   4. Patient will improve right  lower extremity MMT grades by >/=1 grade in order to improve strength for ADL completion   5. Patient will score >/= 21/24 on Dynamic Gait Assessment in order to reduce risk for falls and improve postural control   6. Patient will report 0 falls from initiation of PT management  7. Patient will begin some form of home/community fitness in order to sustain progress gained in PT       Plan   Plan of care Certification: 7/18/2022 to 08/16/2022    Outpatient Physical Therapy 1-2 times weekly for 8 weeks to include the following interventions: Gait Training, Neuromuscular Re-ed, Orthotic Management and Training, Patient Education, Self Care, Therapeutic Activities and Therapeutic Exercise.     Gerald Mccrary, SPT

## 2022-07-18 NOTE — TELEPHONE ENCOUNTER
Care Due:                  Date            Visit Type   Department     Provider  --------------------------------------------------------------------------------                                NP -                              PRIMARY      [facility] PRIMARY  Last Visit: 07-      CARE ([facility])   CARE           [facility]  Next Visit: None Scheduled  None         None Found                                                            Last  Test          Frequency    Reason                     Performed    Due Date  --------------------------------------------------------------------------------    Cr..........  12 months..  DULoxetine...............  Not Found    Overdue    [employer] Embedded Care Gaps. Reference number: [id number]. 7/18/2022   3:40:19 PM CDT

## 2022-07-18 NOTE — PROGRESS NOTES
See initial evaluation in Plan of Care.    Ev Yoo M.A. CCC-SLP, CBIS  Speech Language Pathologist  Certified Brain Injury Specialist  7/18/2022

## 2022-07-18 NOTE — PLAN OF CARE
"Outpatient Neurological Rehabilitation  Speech and Language Therapy Evaluation    Date: 7/18/2022     Name: Adarsh Wheeler   MRN: [id number]    Therapy Diagnosis:   Encounter Diagnoses   Name Primary?    Broca's aphasia Yes    Apraxia of speech      Physician: Kush Serrano MD  Physician Orders:[id number] - AMB REFERRAL/CONSULT TO SPEECH THERAPY  Medical Diagnosis from Referral:   Diagnosis   I63.9 (ICD-10-CM) - Cerebral infarction, unspecified       Visit #/Visits authorized: 1/ 1  Date of Evaluation:  7/18/2022   Insurance Authorization Period: 6/14/22- 12/11/22   Plan of Care Expiration: 7/18/22 to 9/12/22    Time In: 2:03 pm    Time Out: 2:48 pm  Minutes: 45 minutes    Procedure Min.   Speech Language Evaluation   45      Precautions:Standard  Subjective   Date of Onset: Patient reports that this began after his stroke in December (12/31/21).  History of Current Condition:   Pt reports problems with his speech following his stroke in December. Patient stated that it varies from day to day which is frustrating. Patient reports that it is harder to talk with his brother in law due to his accent and it is easiest to talk with his mother since she raised him. Patient reports more communication break downs when people are speaking too fast to him or if they are speaking down to him. Patient stated "I can do everything, but need support." Patient reports that today was just a bad day.   Past Medical History: Adarsh Wheeler  has no past medical history on file.  Adarsh Wheeler  has a past surgical history that includes Eye surgery; Foot surgery; and Hand surgery.  Medical Hx and Allergies:  Adarsh EGAN III has a current medication list which includes the following prescription(s): acetaminophen, aspirin, clopidogrel, duloxetine, lactulose, lisinopril, melatonin, ondansetron, polyethylene glycol, rosuvastatin, sertraline, and trazodone.   Review of patient's allergies indicates:   Allergen Reactions    " "Shellfish containing products Swelling     Prior Therapy:  Yes, patient [facility]   Social History:  Patient lives alone but lives 2.5 miles from sister. Patient is not currently working, but is a , [employer], and has a bachelors in criminal justice. Patient stated he is going to begin driving again when his truck gets out of the shop. Patient's sister takes care of his mother who lives in [place]. Patient stated this location is best due to his sister working in [employer].  Prior Level of Function: No aphasia or apraxia   Current Level of Function: moderate broca's aphasia and associated apraxia of speech   Pain:   4/10  Pain Location / Description:Patient reports pain in the side of neck/throat into jaw   Nutrition: Patient stated he is on a regular/thin diet (International Dysphagia Diet Standardisation Initiative (IDDSI) level 0 and International Dysphagia Diet Standardisation Initiative (IDDSI)  Level 7). Patient stated his sister has him on a diet and does not let him have coke. Patient reports difficulty swallowing occasionally. Patient stated "it just slows down sometimes" and it "gets caught in my throat."    Patient's Therapy Goals:  Patient stated his goals for therapy are "to make my speech better" and to improve his vocabulary and ability to say the words.   Objective   Formal Assessment:  Western Aphasia Battery - Revised (WAB-R) was administered to evaluate the patient's receptive and expressive language function.The purpose stated in the manual for the WAB-R is to determine the presence, severity, and type of aphasia; measure the patient's level of performance to provide a baseline for detecting change over time; provide a comprehensive assessment of the patients language strengths and deficits in order to guide treatment and management; infer the location and etiology of the lesion causing the aphasia.  The following results were revealed: "     Spontaneous Speech Score:   Auditory Comprehension Score: 7.25 / 10  Repetition Score:   7.2 /10  Naming and Word Finding Score:  8.7/ 10  Aphasia Quotient (AQ):   84.3 / 100  Aphasia Classification: Broca's aphasia     Subtest Results:   Information Content: 10 / 10  Fluency, Grammatical Competence, and Paraphasias: 6 /10  Yes / No Questions: 54 / 60  Auditory Word Recognition: 57 / 60  Sequential Commands: 34 / 80  Repetition: 72 /100  Object Namin /60  Word Fluency:   Sentence Completion: 10 /10  Responsive Speech: 10 / 10    Description: Patient presents with Broca's aphasia with associated acquired Apraxia of Speech. Pt presents with relative strengths in naming and word level comprehension. Increased difficulty was noted with syntactic structure and following more complex commands, specifically those with use of prepositional based directions. His word fluency was mildly reduced. For auditory comprehension, patient benefits from contextualize information and presents with significantly more difficulty with decontextualized information. The diagnosis of Apraxia of Speech is consistent with Broca's Aphasia and the associated neuroanatomical site of lesion. The apraxia of speech is characterized by blocks with articulatory groping and inconsistent consonant and vowel distortions.     Treatment   Treatment Time In: n/a  Treatment Time Out: n/a  Total Treatment Time: n/a  no treatment performed 2/2 time to complete evaluation.    Education: Plan of Care and role of SLP in care were discussed with pt. Patient expressed understanding.    Home Program: N/A  Assessment     Liam presents to [facility] s/p medical diagnosis of  Cerebral infarction.  Demonstrates impairments including limitations as described in the problem list. He presents with moderate Broca's aphasia c/b deficits across the four domains of language.The apraxia of speech is characterized by blocks  with articulatory groping and inconsistent consonant and vowel distortions. Positive prognostic factors include patient motivation and family support. Negative prognostic factors include complex medical history. Barriers to therapy include transportation Patient will benefit from skilled, outpatient neurological rehabilitation speech therapy.    Rehab Potential: good  Pt's spiritual, cultural and educational needs considered and pt agreeable to plan of care and goals.    Short Term Goals (4 weeks):   1. Pt will complete VNeST for 4 verbs per session given minimal cues.   2. Pt will follow attentive reading and constraint summarization with moderate cues for story retell with given constraints.  3. Pt will follow complex multiunit commands with 90% accuracy independently.   4. Pt will answer complex multiunit yes/no questions with 90% accuracy independently.   5. Pt will complete verbal reasoning tasks with minimal cues for accuracy.   6. Pt will produce bisyllabic syllabic words with word initial /s/ with 85% acc across 3 probe trials.   7. Pt will produce bisyllabic syllabic words with word initial /r/ with 85% acc across 3 probe trials.     *Consider functionally assessing reading and writing, but formal assessment may not be warranted.     Long Term Goals (8 weeks):   1. He  will develop functional cognitive-linguistic based skills and utilize compensatory strategies to communicate wants and needs effectively to different conversational partners, maintain safety, and participate socially in functional living environment.    2. Pt will develop functional motor programming, articulatory proficiency and utilize compensatory strategies to express wants and needs for intelligible speech and functional prosody in the functional living environment.  3. Pt will comprehend communication related to basic medical and social needs and utilize compensatory strategies to maintain safety and to participate socially in  "functional living environment.      Plan     Plan of Care Certification Period: 7/18/2022  to 9/12/22     Recommended Treatment Plan:  Patient will participate in the [facility] rehabilitation program for speech therapy 1 times per week to address his  Communication deficits, to educate patient and their family, and to participate in a home exercise program.    Other Recommendations:     RAJAT Canales   Clinician  Speech-Language Pathology      "I certify that I was present in the room directing the student and service delivery and guiding them using my skilled judgement. As the co-signing therapist, I have reviewed the student's documentation, and am responsible for the treatment, assessment and plan."      Ev Yoo M.A. CCC-SLP, CBIS  Speech Language Pathologist  Certified Brain Injury Specialist  7/18/2022     "

## 2022-07-19 ENCOUNTER — PATIENT MESSAGE (OUTPATIENT)
Dept: ADMINISTRATIVE | Facility: HOSPITAL | Age: 57
End: 2022-07-19
Payer: OTHER GOVERNMENT

## 2022-07-26 ENCOUNTER — OFFICE VISIT (OUTPATIENT)
Dept: VASCULAR SURGERY | Facility: CLINIC | Age: 57
End: 2022-07-26
Payer: OTHER GOVERNMENT

## 2022-07-26 ENCOUNTER — HOSPITAL ENCOUNTER (OUTPATIENT)
Dept: VASCULAR SURGERY | Facility: CLINIC | Age: 57
Discharge: HOME | End: 2022-07-26
Attending: SURGERY
Payer: OTHER GOVERNMENT

## 2022-07-26 VITALS
BODY MASS INDEX: 27.39 KG/M2 | HEART RATE: 74 BPM | HEIGHT: 68 IN | TEMPERATURE: 99 F | SYSTOLIC BLOOD PRESSURE: 132 MMHG | WEIGHT: 180.75 LBS | DIASTOLIC BLOOD PRESSURE: 74 MMHG

## 2022-07-26 DIAGNOSIS — I65.23 BILATERAL CAROTID ARTERY STENOSIS: ICD-10-CM

## 2022-07-26 DIAGNOSIS — I65.23 BILATERAL CAROTID ARTERY STENOSIS: Primary | ICD-10-CM

## 2022-07-26 PROCEDURE — 93880 EXTRACRANIAL BILAT STUDY: CPT | Mod: 26,S$PBB,, | Performed by: SURGERY

## 2022-07-26 PROCEDURE — 99999 PR PBB SHADOW E&M-EST. PATIENT-LVL III: CPT | Mod: PBBFAC,,, | Performed by: SURGERY

## 2022-07-26 PROCEDURE — 99205 OFFICE O/P NEW HI 60 MIN: CPT | Mod: S$PBB,,, | Performed by: SURGERY

## 2022-07-26 PROCEDURE — 93880 PR DUPLEX SCAN EXTRACRANIAL,BILAT: ICD-10-PCS | Mod: 26,S$PBB,, | Performed by: SURGERY

## 2022-07-26 PROCEDURE — 99205 PR OFFICE/OUTPT VISIT, NEW, LEVL V, 60-74 MIN: ICD-10-PCS | Mod: S$PBB,,, | Performed by: SURGERY

## 2022-07-26 PROCEDURE — 99999 PR PBB SHADOW E&M-EST. PATIENT-LVL III: ICD-10-PCS | Mod: PBBFAC,,, | Performed by: SURGERY

## 2022-07-26 PROCEDURE — 93880 EXTRACRANIAL BILAT STUDY: CPT | Mod: PBBFAC | Performed by: SURGERY

## 2022-07-26 PROCEDURE — 99213 OFFICE O/P EST LOW 20 MIN: CPT | Mod: PBBFAC,25 | Performed by: SURGERY

## 2022-07-26 RX ORDER — SERTRALINE HYDROCHLORIDE 25 MG/1
25 TABLET, FILM COATED ORAL DAILY
COMMUNITY
End: 2023-07-11

## 2022-07-26 NOTE — PROGRESS NOTES
VASCULAR SURGERY SERVICE    CHIEF COMPLAINT:  Major left hemispheric stroke    HISTORY OF PRESENT ILLNESS: Adarsh Wheeler is a 56 y.o. male [facility] who suffered a major left hemispheric stroke in December 2021. Per the sister sister SOFÍA he was found down after no contact for 4 days.  He initially was aphasic with severe motor deficits of his right arm and right leg.  He ultimately spent 8 months in rehab center was discharge proximally 2 months ago.  He is currently living independently.  He is also having some right neck pain    He denies any new symptoms of stroke TIA or amaurosis since this initial event.  However [facility] 6-8 weeks ago that had suggested a new potential subacute or extension of his prior left hemispheric stroke.    [facility] imaging also suggested a carotid siphon extension of his carotid stenosis on the left.    Is currently being managed with dual antiplatelet therapy and statin.  He is a nonsmoker.    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  He carries a diagnosis of posttraumatic stress syndrome    No past medical history on file.    Past Surgical History:   Procedure Laterality Date    EYE SURGERY      FOOT SURGERY      HAND SURGERY           Current Outpatient Medications:     acetaminophen (TYLENOL) 325 MG tablet, Take 650 mg by mouth., Disp: , Rfl:     aspirin (ASPIR-81 ORAL), Take by mouth., Disp: , Rfl:     clopidogreL (PLAVIX) 75 mg tablet, Take 1 tablet (75 mg total) by mouth once. for 1 dose, Disp: 90 tablet, Rfl: 0    DULoxetine (CYMBALTA) 30 MG capsule, Take 1 capsule (30 mg total) by mouth once daily., Disp: 90 capsule, Rfl: 3    lactulose (CHRONULAC) 10 gram/15 mL solution, Take 20 g by mouth., Disp: , Rfl:     lisinopriL (PRINIVIL,ZESTRIL) 2.5 MG tablet, Take 1 tablet (2.5 mg total) by mouth once daily., Disp: 90 tablet, Rfl: 3    MELATONIN ORAL, Take by mouth., Disp: , Rfl:     ondansetron  "(ZOFRAN) 4 MG tablet, Take 1 tablet by mouth every 6 (six) hours as needed., Disp: , Rfl:     polyethylene glycol (GLYCOLAX) 17 gram/dose powder, Take by mouth., Disp: , Rfl:     rosuvastatin (CRESTOR) 40 MG Tab, Take 1 tablet (40 mg total) by mouth once daily., Disp: 90 tablet, Rfl: 3    sertraline (ZOLOFT) 25 MG tablet, Take 25 mg by mouth once daily., Disp: , Rfl:     traZODone (DESYREL) 100 MG tablet, Take 0.5 tablets by mouth nightly as needed., Disp: , Rfl:     Review of patient's allergies indicates:   Allergen Reactions    Shellfish containing products Swelling    Iodine and iodide containing products     Mirtazapine Other (See Comments)    Escitalopram Nausea And Vomiting       Family History   Problem Relation Age of Onset    COPD Father             Cancer Sister         Bilateral Masectomy    Stroke Maternal Uncle         deceases       Social History     Tobacco Use    Smoking status: Former Smoker     Types: Pipe     Quit date: 2014     Years since quittin.5    Smokeless tobacco: Never Used   Substance Use Topics    Alcohol use: Not Currently    Drug use: Never       REVIEW OF SYSTEMS:  General: No chills, fever, malaise, changes in weight  HEENT: No visual changes, difficulty hearing  Cardiovascular: No chest pain, palpitations, claudication  Pulmonary: No dyspnea, cough, wheezing  Gastrointestinal: No nausea, vomiting, diarrhea, constipation  Genitourinary: No dysuria, low urine output, hematuria  Endocrine: No polydipsia, polyphagia  Hematologic: No fatigue with exertion, pica, pallor  Musculoskeletal: No extremity or joint pain, no back pain, no difficulty with ambulation  Neurologic: no seizures, no headaches, no weakness  Psychiatric: no mood disturbance      PHYSICAL EXAM:   /74 (BP Location: Left arm, Patient Position: Sitting, BP Method: Large (Automatic))   Pulse 74   Temp 98.5 °F (36.9 °C) (Oral)   Ht 5' 8" (1.727 m)   Wt 82 kg (180 lb 12.4 oz)   BMI " 27.49 kg/m²   Constitutional:  Alert,   Well-appearing  In no distress.   Neurological: Speech is relatively fluent, although he says that his speech is not completely returned to normal  Left arms 4+ over 5 strength.   He ambulates without assistance  no focal findings  CN II - XII grossly intact.    Psychiatric: Mood and affect appropriate and symmetric.   HEENT: Normocephalic / atraumatic  PERRLA  Midline trachea  No scars across the neck   Cardiac: Regular rate and rhythm.   Pulmonary: Normal pulmonary effort.   Abdomen: Soft, not distended.     Skin: Warm and well perfused.    Vascular:  2+ radial pulses bilaterally   Extremities/  Musculoskeletal: No edema.        IMAGING:  Carotid duplex today reveals right proximal 80% carotid stenosis with peak velocity of 273 and diastolic 108.   Left carotid duplex reveals a 67 9% stenosis by velocity criteria with peak systolic velocity of 230 2 end-diastolic of 46.  However heavy acoustic shadowing secondary to calcific plaque denies complete visualization of both vessels    IMPRESSION:   1.  Major left hemispheric stroke December 2021, no clinical recurrent events.  2.  Has bilateral proximally 80% carotid stenosis by duplex    Outside imaging suggest siphon involvement of his stenosis on the Left.    PLAN:  Follow-up after his carotid CTA which is already been ordered by vascular Neurology  Continue current aggressive medical therapy with dual antiplatelet therapy and statin    W. Adarsh Servin III, MD, FACS  Professor and Chief, Vascular and Endovascular Surgery

## 2022-08-01 NOTE — PROGRESS NOTES
[facility]  Speech Therapy Treatment Note- Neurological Rehabilitation  Date: 8/2/2022     Name: Adarsh Wheeler   MRN: [id number]   Therapy Diagnosis:   Encounter Diagnoses   Name Primary?    Broca's aphasia Yes    Apraxia of speech    Physician: Kush Serrano MD  Physician Orders: [id number] - AMB REFERRAL/CONSULT TO SPEECH THERAPY  Medical Diagnosis: I63.9 (ICD-10-CM) - Cerebral infarction, unspecified    Visit #/ Visits Authorized: 3/ 15  Date of Evaluation:  7/18/2022   Insurance Authorization Period:  6/14/22- 12/11/22   Plan of Care Expiration Date:    9/12/22  Extended Plan of Care:  n/a   Progress Note: 8/19/2022   Visits Cancelled: 0  Visits No Show: 0    Time In:  12:30  Time Out:  1:15  Total Billable Time: 45 minutes     Precautions: Standard  Subjective:   Patient reports: Patient is pleasant and was comfortable to share his story with me.   He was compliant to home exercise program.   Response to previous treatment: fair   Pain Scale:  0/10 on a Visual Analog Scale currently.   Pain Location: no pain indicated throughout session    Objective:           UNTIMED  Procedure Min.   Speech- Language- Voice Therapy  45   Total Timed Units: 0  Total Untimed Units: 1  Charges Billed/Number of units: 1    Short Term Goals: (4 weeks) Current Progress:   1. Pt will complete VNeST for 4 verbs per session given minimal cues.    Progressing/ Not Met 8/2/2022   Not addressed in today's session.     2. Pt will follow attentive reading and constraint summarization with moderate cues for story retell with given constraints.    Progressing/ Not Met 8/2/2022   Not addressed in today's session.     3. Pt will follow complex multiunit commands with 90% accuracy independently.    Progressing/ Not Met 8/2/2022   Patient was able to follow complex commands with 40% accuracy.    When given mod-max cues patient was able to achieve 80% accuracy.   4. Pt will answer complex multiunit yes/no questions with 90%  accuracy independently.      Progressing/ Not Met 8/2/2022   Patient was able to answer complex multiunit yes/no questions with 90% accuracy.   5. Pt will complete verbal reasoning tasks with minimal cues for accuracy.     Progressing/ Not Met 8/2/2022   Not addressed in today's session.     6. Pt will produce bisyllabic syllabic words with word initial /s/ with 85% acc across 3 probe trials.     Progressing/ Not Met 8/2/2022   See chart below.   7. Pt will produce bisyllabic syllabic words with word initial /r/ with 85% acc across 3 probe trials.        Progressing/ Not Met 8/2/2022   See chart below.       /s/ two syllable    Step 1 - Repetition / Minimal Contrast Word Step 2 - Printed Letter or Target Sound with Repetition Step 3- Integral Stimulation Step 4- Articulatory Placement Cues 5 Repetitions of Correct Production   [place]  +        5   Kaylie  +        5   Sentence  +        5   Silent  +      +  4   Social  -      +  4   Someone  +  +      4   Sample  +        5   Success  +        5   Harris  -  +    +  3   Summer  +        5   [place]  -        5   Solid  +  +  +  +  3         /s/ multisyllable    Step 1 - Repetition / Minimal Contrast Word Step 2 - Printed Letter or Target Sound with Repetition Step 3- Integral Stimulation Step 4- Articulatory Placement Cues 5 Repetitions of Correct Production   Syllable  +        5/5 independently     -  +    +  2/5 given mod cues   Subtraction  +  +      4/5 given min cues   Solution  +  +      4/5 given mod cues   Salmonella  +        5/5 independently   September  +        5/5 independently   Segregation  -  +      4/5 given min cues   Silhouette  +  +  +  +  2/ 5 given max cues   Sophomore  +  +    +  3/5 given mod cues   Serious  +        5/5 independently   Sanity  -        5/5 independently   Surgery  -  +  +  +  1/5 given max cues       /r/ two syllable    Step 1 - Repetition / Minimal Contrast Word Step 2 - Printed Letter or Target Sound with  Repetition Step 3- Integral Stimulation Step 4- Articulatory Placement Cues 5 Repetitions of Correct Production   [place]  +        5/5 independently   Rapper  +  +    +  3/5 given mod cues   Rhyming  +        5/5 independently   Riot  +        5/5 independently   Reason  +        5/5 independently   Respect  +        5/5 independently   Reading  +        5/5 independently   Romance  +        5/5 independently   Rocket  +        5/5 independently   Running  +        5/5 independently   Ruler  +  + +  +  3/5 given max cues   Chris  +        5/5 independently         /r/ multisyllable    Step 1 - Repetition / Minimal Contrast Word Step 2 - Printed Letter or Target Sound with Repetition Step 3- Integral Stimulation Step 4- Articulatory Placement Cues 5 Repetitions of Correct Production   Radio  +        5/5 independently   Rainforest  -        5/5 independently   Restaurant  +        5/5 independently   Relaxation  -  +    +  3/5 given mod cues   Righteousness  +  +  +  +  3/5 given mod cues   Renovation  -  + +   +  2/5 given max cues   Aaron  +        5/5 independently   Recycle  -      +  4/5 given min cues   Romanticize  -        5/5 independently   Rambunctious  +   +  +  +  3/5 given max cues   [place]  -  +  +  +  2/5 given max cues   Radioactive  -        5/5 independently         Patient Education/Response:   SLP reviewed goals with patient and educated him on what happens to the brain after a stroke as it relates to communication. Patient verbalized understanding.    Home program established: not yet established  Exercises were reviewed and Liam was able to demonstrate them prior to the end of the session.  Liam demonstrated good  understanding of the education provided.     See Electronic Medical Record under Patient Instructions for exercises provided throughout therapy.  Assessment:   Liam is progressing well towards his goals. Patient was pleasant. SLP educated the patient on what happens  to the brain after a stroke as it relates to communication. Patient verbalized understanding. Patient performed well in answering yes/no questions but demonstrated difficulties in following multiunit commands. SLP ensured that the rate that I was speaking at was a pace that he could understand. Note: patient needs additional time to process instructions when they are given. Patient performed well in bi-syllabic words and insisted that he work on multi-syllabic words. Patient demonstrated more difficulties with multi-syllabic in comparison to bi-syllabic words. Patient appears compliant and extremely dedicated in working to improve his communication.Current goals remain appropriate. Goals to be updated as necessary.     Patient prognosis is Good. Patient will continue to benefit from skilled outpatient speech and language therapy to address the deficits listed in the problem list on initial evaluation, provide patient/family education and to maximize patient's level of independence in the home and community environment.   Medical necessity is demonstrated by the following IMPAIRMENTS:  Deficits in executive functioning and memory prevent the patient performing his work and personal daily activities effectively and efficiently which may put him at risk of unsafe behavior and a decline in quality of life.  Barriers to Therapy: transportation  Patient's spiritual, cultural and educational needs considered and patient agreeable to plan of care and goals.  Plan:   Continue Plan of Care with focus on communication deficits    JOSESITO Bonner-SLP   8/2/2022

## 2022-08-02 ENCOUNTER — CLINICAL SUPPORT (OUTPATIENT)
Dept: REHABILITATION | Facility: HOSPITAL | Age: 57
End: 2022-08-02
Payer: OTHER GOVERNMENT

## 2022-08-02 DIAGNOSIS — R29.898 WEAKNESS OF RIGHT LOWER EXTREMITY: Primary | ICD-10-CM

## 2022-08-02 DIAGNOSIS — R47.01 BROCA'S APHASIA: Primary | ICD-10-CM

## 2022-08-02 DIAGNOSIS — R48.2 APRAXIA OF SPEECH: ICD-10-CM

## 2022-08-02 DIAGNOSIS — R27.8 COORDINATION IMPAIRMENT: ICD-10-CM

## 2022-08-02 DIAGNOSIS — R26.9 GAIT DIFFICULTY: ICD-10-CM

## 2022-08-02 PROCEDURE — 92507 TX SP LANG VOICE COMM INDIV: CPT | Mod: PN

## 2022-08-02 PROCEDURE — 97110 THERAPEUTIC EXERCISES: CPT | Mod: PN,59

## 2022-08-02 PROCEDURE — 97112 NEUROMUSCULAR REEDUCATION: CPT | Mod: PN

## 2022-08-02 NOTE — PROGRESS NOTES
"[facility]   Physical Therapy Treatment Note     Name: Adarsh Wheeler  Clinic Number: [id number]    Therapy Diagnosis:   Encounter Diagnoses   Name Primary?    Weakness of right lower extremity Yes    Coordination impairment     Gait difficulty      Physician: Franco Thompson MD    Visit Date: 8/2/2022    Physician Orders: PT Eval and Treat   Medical Diagnosis from Referral: I63.322 (ICD-10-CM) - Cerebral infarction due to thrombosis of left anterior cerebral artery  Evaluation Date: 7/18/2022  Authorization Period Expiration: 06/15/2023  Plan of Care Expiration: 9/16/22  Visit # / Visits authorized: 2/15  FOTO: Complete next    PTA Visit #: 0/5     Time In: 11:45AM  Time Out: 12:30PM  Total Billable Time: 45 minutes (1TE, 2NMR)    Precautions: Standard and expressive aphasia     SUBJECTIVE     Patient reports: No major changes since initial evaluation.  He will be compliant with home exercise program.  Response to previous treatment: Last session was initial evaluation   Functional change: Last session was initial evaluation     Pain: 4/10  Location: right shoulder      OBJECTIVE     Objective Measures updated at progress report unless specified.     Treatment     Liam received the treatments listed below:      therapeutic exercises to develop strength, endurance, ROM and flexibility for 20 minutes including:  - reciprocal stepper bike x 8 minutes level 4 double hills setting  - standing gastroc stretch 2x30"B  - standing heel-toe raises     neuromuscular re-education activities to improve: Balance, Coordination, Kinesthetic, Sense and Proprioception for 25 minutes. The following activities were included:  - semi-tandem stance on 6" step + red med ball chest press x10B  - tandem walking with fingertip support 4 lengths x 10 feet each  - lateral walks + cone taps with minimal upper extremity support x 5 lengths x 10 feet each  - staggered sit to stands from chair height " 3x5  - stand <> half kneel transfers x 5B with left upper extremity support    Patient Education and Home Exercises     Home Exercises Provided and Patient Education Provided     Education provided:   - HEP to be provided next    Written Home Exercises Provided: Provide updated HEP next.     ASSESSMENT     Mr. Wheeler tolerated first full treatment session without adverse response. Appropriate levels and location of muscle fatigue achieved with pertinent activity. Good eccentric control noted with floor transfers and patient to likely benefit from developmental sequencing at next visit to improve limb dissociation, core strength, and hip mobility.    Liam Is progressing well towards his goals.   Patient prognosis is Fair.     Pt will continue to benefit from skilled outpatient physical therapy to address the deficits listed in the problem list box on initial evaluation, provide pt/family education and to maximize pt's level of independence in the home and community environment.     Pt's spiritual, cultural and educational needs considered and pt agreeable to plan of care and goals.     Anticipated barriers to physical therapy: Distance to clinic     Goals:     Short Term Goals: 4 weeks   1. Patient will be demonstrate understanding and be compliant with HEP in order to maximize PT benefits (progressing, not met)  2. Patient will improve right lower extremity MMT grades by >/=1/2 grade in order to improve strength for ADL completion (progressing, not met)  3. Patient will complete FOTO limitation survey in order to establish baseline of self-perceived functional mobility deficits (progressing, not met)      Long Term Goals: 8 weeks   4. Patient will improve right  lower extremity MMT grades by >/=1 grade in order to improve strength for ADL completion (progressing, not met)  5. Patient will score >/= 21/24 on Dynamic Gait Assessment in order to reduce risk for falls and improve postural control (progressing, not  met)  6. Patient will report 0 falls from initiation of PT management (progressing, not met)  7. Patient will begin some form of home/community fitness in order to sustain progress gained in PT (progressing, not met)      PLAN     Provide HEP next; developmental sequencing next; FOTO next    RAIN HOGAN, PT

## 2022-08-07 ENCOUNTER — HOSPITAL ENCOUNTER (OUTPATIENT)
Dept: RADIOLOGY | Facility: HOSPITAL | Age: 57
Discharge: HOME | End: 2022-08-07
Attending: STUDENT IN AN ORGANIZED HEALTH CARE EDUCATION/TRAINING PROGRAM
Payer: OTHER GOVERNMENT

## 2022-08-07 DIAGNOSIS — Z86.73 HISTORY OF STROKE: ICD-10-CM

## 2022-08-07 DIAGNOSIS — Z13.6 ENCOUNTER FOR SCREENING FOR CARDIOVASCULAR DISORDERS: ICD-10-CM

## 2022-08-07 DIAGNOSIS — I63.032: ICD-10-CM

## 2022-08-07 PROCEDURE — 70498 CTA HEAD AND NECK (XPD): ICD-10-PCS | Mod: 26,,, | Performed by: RADIOLOGY

## 2022-08-07 PROCEDURE — 70496 CT ANGIOGRAPHY HEAD: CPT | Mod: TC

## 2022-08-07 PROCEDURE — 70496 CTA HEAD AND NECK (XPD): ICD-10-PCS | Mod: 26,,, | Performed by: RADIOLOGY

## 2022-08-07 PROCEDURE — 70496 CT ANGIOGRAPHY HEAD: CPT | Mod: 26,,, | Performed by: RADIOLOGY

## 2022-08-07 PROCEDURE — 70498 CT ANGIOGRAPHY NECK: CPT | Mod: 26,,, | Performed by: RADIOLOGY

## 2022-08-07 PROCEDURE — 25500020 PHARM REV CODE 255: Performed by: STUDENT IN AN ORGANIZED HEALTH CARE EDUCATION/TRAINING PROGRAM

## 2022-08-07 RX ADMIN — IOHEXOL 100 ML: 350 INJECTION, SOLUTION INTRAVENOUS at 10:08

## 2022-08-09 ENCOUNTER — HOSPITAL ENCOUNTER (OUTPATIENT)
Dept: CARDIOLOGY | Facility: CLINIC | Age: 57
Discharge: HOME | End: 2022-08-09
Payer: OTHER GOVERNMENT

## 2022-08-09 ENCOUNTER — OFFICE VISIT (OUTPATIENT)
Dept: VASCULAR SURGERY | Facility: CLINIC | Age: 57
End: 2022-08-09
Payer: OTHER GOVERNMENT

## 2022-08-09 VITALS
BODY MASS INDEX: 25.62 KG/M2 | DIASTOLIC BLOOD PRESSURE: 68 MMHG | HEART RATE: 59 BPM | SYSTOLIC BLOOD PRESSURE: 125 MMHG | WEIGHT: 183 LBS | TEMPERATURE: 98 F | HEIGHT: 71 IN

## 2022-08-09 DIAGNOSIS — Z01.818 PRE-OP EVALUATION: ICD-10-CM

## 2022-08-09 DIAGNOSIS — I65.23 BILATERAL CAROTID ARTERY STENOSIS: Primary | ICD-10-CM

## 2022-08-09 DIAGNOSIS — I65.22 LEFT CAROTID ARTERY STENOSIS: Primary | ICD-10-CM

## 2022-08-09 PROCEDURE — 93010 ELECTROCARDIOGRAM REPORT: CPT | Mod: S$PBB,,, | Performed by: INTERNAL MEDICINE

## 2022-08-09 PROCEDURE — 93010 EKG 12-LEAD: ICD-10-PCS | Mod: S$PBB,,, | Performed by: INTERNAL MEDICINE

## 2022-08-09 PROCEDURE — 99214 OFFICE O/P EST MOD 30 MIN: CPT | Mod: PBBFAC | Performed by: SURGERY

## 2022-08-09 PROCEDURE — 99215 OFFICE O/P EST HI 40 MIN: CPT | Mod: S$PBB,,, | Performed by: SURGERY

## 2022-08-09 PROCEDURE — 99999 PR PBB SHADOW E&M-EST. PATIENT-LVL IV: ICD-10-PCS | Mod: PBBFAC,,, | Performed by: SURGERY

## 2022-08-09 PROCEDURE — 99215 PR OFFICE/OUTPT VISIT, EST, LEVL V, 40-54 MIN: ICD-10-PCS | Mod: S$PBB,,, | Performed by: SURGERY

## 2022-08-09 PROCEDURE — 99999 PR PBB SHADOW E&M-EST. PATIENT-LVL IV: CPT | Mod: PBBFAC,,, | Performed by: SURGERY

## 2022-08-09 PROCEDURE — 93005 ELECTROCARDIOGRAM TRACING: CPT | Mod: PBBFAC | Performed by: INTERNAL MEDICINE

## 2022-08-09 NOTE — PROGRESS NOTES
VASCULAR SURGERY SERVICE    CHIEF COMPLAINT:  Major left hemispheric stroke    HISTORY OF PRESENT ILLNESS: Adarsh Wheeler is a 56 y.o. male [facility] who suffered a major left hemispheric stroke in December 2021. Per the sister sister SOFÍA he was found down after no contact for 4 days.  He initially was aphasic with severe motor deficits of his right arm and right leg.  He ultimately spent 8 months in rehab center was discharge proximally 2 months ago.  He is currently living independently.  He is also having some right neck pain    He denies any new symptoms of stroke TIA or amaurosis since this initial event.  However [facility]-8 weeks ago that had suggested a new potential subacute or extension of his prior left hemispheric stroke.    [facility] imaging also suggested a carotid siphon extension of his carotid stenosis on the left.    Is currently being managed with dual antiplatelet therapy and statin.  He is a nonsmoker.    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  He carries a diagnosis of posttraumatic stress syndrome    8/9/2022:  He now returns after CTA imaging.  No change in his prior history as detailed above.  Continues on dual antiplatelet therapy, and statin    History reviewed. No pertinent past medical history.    Past Surgical History:   Procedure Laterality Date    EYE SURGERY      FOOT SURGERY      HAND SURGERY           Current Outpatient Medications:     acetaminophen (TYLENOL) 325 MG tablet, Take 650 mg by mouth., Disp: , Rfl:     aspirin (ASPIR-81 ORAL), Take by mouth., Disp: , Rfl:     DULoxetine (CYMBALTA) 30 MG capsule, Take 1 capsule (30 mg total) by mouth once daily., Disp: 90 capsule, Rfl: 3    lactulose (CHRONULAC) 10 gram/15 mL solution, Take 20 g by mouth., Disp: , Rfl:     lisinopriL (PRINIVIL,ZESTRIL) 2.5 MG tablet, Take 1 tablet (2.5 mg total) by mouth once daily., Disp: 90 tablet, Rfl: 3    MELATONIN  ORAL, Take by mouth., Disp: , Rfl:     ondansetron (ZOFRAN) 4 MG tablet, Take 1 tablet by mouth every 6 (six) hours as needed., Disp: , Rfl:     polyethylene glycol (GLYCOLAX) 17 gram/dose powder, Take by mouth., Disp: , Rfl:     rosuvastatin (CRESTOR) 40 MG Tab, Take 1 tablet (40 mg total) by mouth once daily., Disp: 90 tablet, Rfl: 3    sertraline (ZOLOFT) 25 MG tablet, Take 25 mg by mouth once daily., Disp: , Rfl:     traZODone (DESYREL) 100 MG tablet, Take 0.5 tablets by mouth nightly as needed., Disp: , Rfl:     clopidogreL (PLAVIX) 75 mg tablet, Take 1 tablet (75 mg total) by mouth once. for 1 dose, Disp: 90 tablet, Rfl: 0    Review of patient's allergies indicates:   Allergen Reactions    Shellfish containing products Swelling    Mirtazapine Other (See Comments)    Escitalopram Nausea And Vomiting       Family History   Problem Relation Age of Onset    COPD Father             Cancer Sister         Bilateral Masectomy    Stroke Maternal Uncle         deceases       Social History     Tobacco Use    Smoking status: Former Smoker     Types: Pipe     Quit date: 2014     Years since quittin.6    Smokeless tobacco: Never Used   Substance Use Topics    Alcohol use: Not Currently    Drug use: Never       REVIEW OF SYSTEMS:  General: No chills, fever, malaise, changes in weight  HEENT: No visual changes, difficulty hearing  Cardiovascular: No chest pain, palpitations, claudication  Pulmonary: No dyspnea, cough, wheezing  Gastrointestinal: No nausea, vomiting, diarrhea, constipation  Genitourinary: No dysuria, low urine output, hematuria  Endocrine: No polydipsia, polyphagia  Hematologic: No fatigue with exertion, pica, pallor  Musculoskeletal: No extremity or joint pain, no back pain, no difficulty with ambulation  Neurologic: no seizures, no headaches, no weakness  Psychiatric: no mood disturbance      PHYSICAL EXAM:   /68 (BP Location: Left arm, Patient Position: Sitting, BP  "Method: Large (Automatic))   Pulse (!) 59   Temp 98.2 °F (36.8 °C) (Oral)   Ht 5' 11" (1.803 m)   Wt 83 kg (182 lb 15.7 oz)   BMI 25.52 kg/m²   Constitutional:  Alert,   Well-appearing  In no distress.   Neurological: Speech is relatively fluent, although he says that his speech is not completely returned to normal  RIGHT arms 4+ over 5 strength.   He ambulates without assistance, although he has slight right foot drag  no focal findings  CN II - XII grossly intact.    Psychiatric: Mood and affect appropriate and symmetric.   HEENT: Normocephalic / atraumatic  PERRLA  Midline trachea  No scars across the neck   Cardiac: Regular rate and rhythm.   Pulmonary: Normal pulmonary effort.   Abdomen: Soft, not distended.     Skin: Warm and well perfused.    Vascular:  2+ radial pulses bilaterally   Extremities/  Musculoskeletal: No edema.        IMAGING:  Recent carotid CTA was personally reviewed and demonstrates a 99% left cervical carotid stenosis with calcification.  The remainder of the upper cervical ICA  And siphon has diminished caliber throughout but is smooth.  There is a moderate right carotid stenosis      Carotid duplex today reveals right proximal 80% carotid stenosis with peak velocity of 273 and diastolic 108.   Left carotid duplex reveals a 67 9% stenosis by velocity criteria with peak systolic velocity of 230 2 end-diastolic of 46.  However heavy acoustic shadowing secondary to calcific plaque denies complete visualization of both vessels    IMPRESSION:   1.  99% stenosis, left carotid artery, status post prior major left hemispheric stroke.   I believe that the small caliber of his upper ICA and a siphon is due to under perfusion, not a anatomic stenosis.   Definitive therapy with CEA is possible and indicated.  I discussed this in great detail with the patient and family.  They wished to proceed    PLAN:  LEFT carotid endarterectomy 08/15/2022  Continue dual antiplatelet therapy and statin up till " time of procedure  GETA    I have explained the risks, benefits and alternatives for this procedure in detail.  Specifically I talked about the slight increased risk of reperfusion syndrome given the marked increase in left hemispheric perfusion that he will achieve after this per procedure.  I also discussed in the unlikely event that his upper ICA appears highly diseased throughout, the ICA ligation might be necessary although very unlikely.  The patient voices understanding and all questions have be answered, and agrees to proceed with the procedure.    RALPH Servin III, MD, FACS  Professor and Chief, Vascular and Endovascular Surgery

## 2022-08-11 ENCOUNTER — HOSPITAL ENCOUNTER (OUTPATIENT)
Dept: RADIOLOGY | Facility: HOSPITAL | Age: 57
Discharge: HOME | End: 2022-08-11
Attending: SURGERY
Payer: OTHER GOVERNMENT

## 2022-08-11 DIAGNOSIS — Z01.818 PRE-OP EVALUATION: ICD-10-CM

## 2022-08-11 PROCEDURE — 71046 XR CHEST PA AND LATERAL: ICD-10-PCS | Mod: 26,,, | Performed by: RADIOLOGY

## 2022-08-11 PROCEDURE — 71046 X-RAY EXAM CHEST 2 VIEWS: CPT | Mod: TC,FY,PO

## 2022-08-11 PROCEDURE — 71046 X-RAY EXAM CHEST 2 VIEWS: CPT | Mod: 26,,, | Performed by: RADIOLOGY

## 2022-08-12 ENCOUNTER — TELEPHONE (OUTPATIENT)
Dept: INTERNAL MEDICINE | Facility: CLINIC | Age: 57
End: 2022-08-12
Payer: OTHER GOVERNMENT

## 2022-08-12 ENCOUNTER — TELEPHONE (OUTPATIENT)
Dept: VASCULAR SURGERY | Facility: CLINIC | Age: 57
End: 2022-08-12
Payer: OTHER GOVERNMENT

## 2022-08-12 DIAGNOSIS — R53.1 RIGHT SIDED WEAKNESS: Primary | ICD-10-CM

## 2022-08-12 DIAGNOSIS — I63.9 LEFT-SIDED CEREBROVASCULAR ACCIDENT (CVA): ICD-10-CM

## 2022-08-12 NOTE — TELEPHONE ENCOUNTER
Contacted patient's sister to explain that surgery with Dr. Elizabeth on Monday 8/15/22 has been rescheduled to Wednesday 8/18/22 and that nurse will call day before with time of arrival. Sister verbalized understanding and states this date will be fine.

## 2022-08-12 NOTE — TELEPHONE ENCOUNTER
----- Message from Corinne Rapier, OT sent at 8/1/2022  3:44 PM CDT -----  Regarding: Occupational Therapy Evaluation  Good Afternoon Dr. Woods,     My name is Corinne and I am the Neuro occupational therapist at [facility].     Adarsh Wheeler came in for a PT/SLP evaluation 2 weeks ago to address deficits following his stroke. He would like to receive OT due to right hemiparesis and severe pain but [facility]     Would you be able to place the OT order?   I appreciate your help with this and let me know if there is anything I can do to help.     Have a great day,   Corinne Rapier, MOTR

## 2022-08-16 ENCOUNTER — TELEPHONE (OUTPATIENT)
Dept: VASCULAR SURGERY | Facility: CLINIC | Age: 57
End: 2022-08-16
Payer: OTHER GOVERNMENT

## 2022-08-16 ENCOUNTER — ANESTHESIA EVENT (OUTPATIENT)
Dept: SURGERY | Facility: HOSPITAL | Age: 57
DRG: 038 | End: 2022-08-16
Payer: OTHER GOVERNMENT

## 2022-08-16 NOTE — PRE-PROCEDURE INSTRUCTIONS
PRE-OP INSTRUCTIONS TO PATIENT'S SISTER:  Instructed to have nothing by mouth past midnight.  It is ok to take AM medications with a few sips of water.  Instructed to shower the night before surgery as well as the morning of surgery with an antibacterial soap like dial or hibiclens from the neck down.  Encouraged to wear loose fitting, comfortable clothing .  Medication instructions for pm prior to and am of surgery reviewed.  Instructed to avoid taking vitamins,supplements or ibuprofen the am of surgery.    Patient's sister reported that they planned to have patient take no medications the night before surgery or the morning of surgery    Patient's sister denies patient having any side effects or issues with anesthesia or sedation.

## 2022-08-16 NOTE — ANESTHESIA PREPROCEDURE EVALUATION
[facility]  Anesthesia Pre-Operative Evaluation         Patient Name: Adarsh Wheeler  YOB: 1965  MRN: [id number]    SUBJECTIVE:     Pre-operative evaluation for Procedure(s) (LRB):  ENDARTERECTOMY-CAROTID (Left)     08/16/2022    Adarsh Wheeler is a 56 y.o. male w/ a significant PMHx of HTN, HLD, and CVA (suffered a major left hemispheric stroke in December 2021) 2/2 carotid stenosis. Now on DAPT (ASA and Plavix) .    Patient now presents for the above procedure(s).    Carotid CTA:    IMAGING:  Recent carotid CTA was personally reviewed and demonstrates a 99% left cervical carotid stenosis with calcification.  The remainder of the upper cervical ICA  And siphon has diminished caliber throughout but is smooth.  There is a moderate right carotid stenosis     Carotid duplex today reveals right proximal 80% carotid stenosis with peak velocity of 273 and diastolic 108. Left carotid duplex reveals a 67 9% stenosis by velocity criteria with peak systolic velocity of 230 2 end-diastolic of 46.  However heavy acoustic shadowing secondary to calcific plaque denies complete visualization of both vessels    IMPRESSION:   1.  99% stenosis, left carotid artery, status post prior major left hemispheric stroke.  I believe that the small caliber of his upper ICA and a siphon is due to under perfusion, not a anatomic stenosis.  Definitive therapy with CEA is possible and indicated.    LDA: None documented.    Prev airway: None documented.    Drips: None documented.      Patient Active Problem List   Diagnosis    Left-sided cerebrovascular accident (CVA)    Right sided weakness    Mixed hyperlipidemia    Hypertensive heart disease without heart failure    History of stroke    Weakness of right lower extremity    Coordination impairment    Gait difficulty    Broca's aphasia    Apraxia of speech    Bilateral carotid artery stenosis       Review of patient's allergies indicates:    Allergen Reactions    Escitalopram Nausea And Vomiting    Mirtazapine Rash       Current Inpatient Medications:      No current facility-administered medications on file prior to encounter.     Current Outpatient Medications on File Prior to Encounter   Medication Sig Dispense Refill    aspirin (ASPIR-81 ORAL) Take by mouth every morning.      clopidogreL (PLAVIX) 75 mg tablet Take 1 tablet (75 mg total) by mouth once. for 1 dose (Patient taking differently: Take 75 mg by mouth every morning.) 90 tablet 0    lisinopriL (PRINIVIL,ZESTRIL) 2.5 MG tablet Take 1 tablet (2.5 mg total) by mouth once daily. (Patient taking differently: Take 2.5 mg by mouth every morning.) 90 tablet 3    MELATONIN ORAL Take by mouth every evening.      rosuvastatin (CRESTOR) 40 MG Tab Take 1 tablet (40 mg total) by mouth once daily. (Patient taking differently: Take 40 mg by mouth every evening.) 90 tablet 3    acetaminophen (TYLENOL) 325 MG tablet Take 650 mg by mouth.      DULoxetine (CYMBALTA) 30 MG capsule Take 1 capsule (30 mg total) by mouth once daily. (Patient taking differently: Take 30 mg by mouth daily as needed.) 90 capsule 3    lactulose (CHRONULAC) 10 gram/15 mL solution Take 20 g by mouth.      ondansetron (ZOFRAN) 4 MG tablet Take 1 tablet by mouth every 6 (six) hours as needed.      polyethylene glycol (GLYCOLAX) 17 gram/dose powder Take by mouth.      quetiapine (SEROQUEL) 12.5 MG tablet Take by mouth every evening.      sertraline (ZOLOFT) 25 MG tablet Take 25 mg by mouth once daily.      traZODone (DESYREL) 100 MG tablet Take 0.5 tablets by mouth nightly as needed.         Past Surgical History:   Procedure Laterality Date    EYE SURGERY      FOOT SURGERY      HAND SURGERY         Social History     Socioeconomic History    Marital status: Single   Tobacco Use    Smoking status: Former Smoker     Types: Pipe     Quit date: 2014     Years since quittin.6    Smokeless tobacco: Never Used    Substance and Sexual Activity    Alcohol use: Not Currently    Drug use: Never    Sexual activity: Not Currently     Birth control/protection: None       OBJECTIVE:     Vital Signs Range (Last 24H):         Significant Labs:  Lab Results   Component Value Date    WBC 8.24 08/09/2022    HGB 14.4 08/09/2022    HCT 43.8 08/09/2022     08/09/2022    CHOL 137 12/09/2021    TRIG 166 12/09/2021    HDL 33 12/09/2021     08/09/2022    K 4.1 08/09/2022     08/09/2022    CREATININE 1.0 08/09/2022    BUN 10 08/09/2022    CO2 28 08/09/2022       Diagnostic Studies: No relevant studies.    EKG:   Results for orders placed or performed during the hospital encounter of 08/09/22   EKG 12-lead    Collection Time: 08/09/22  3:05 PM    Narrative    Test Reason : Z01.818,    Vent. Rate : 059 BPM     Atrial Rate : 059 BPM     P-R Int : 118 ms          QRS Dur : 108 ms      QT Int : 436 ms       P-R-T Axes : 031 -28 010 degrees     QTc Int : 431 ms    Sinus bradycardia  Otherwise normal ECG  No previous ECGs available  Confirmed by Kalee Christopher MD ([id number]) on 8/9/2022 4:36:11 PM    Referred By: ROSANNA GOMEZ           Confirmed By:Kalee Christopher MD       2D ECHO:  TTE:  No results found for this or any previous visit.    JOEY:  No results found for this or any previous visit.    ASSESSMENT/PLAN:           Pre-op Assessment    I have reviewed the Patient Summary Reports.     I have reviewed the Nursing Notes. I have reviewed the NPO Status.   I have reviewed the Medications.     Review of Systems  Anesthesia Hx:  No problems with previous Anesthesia  Denies Family Hx of Anesthesia complications.   Denies Personal Hx of Anesthesia complications.   Social:  Former Smoker    Hematology/Oncology:  Hematology Normal   Oncology Normal     EENT/Dental:EENT/Dental Normal   Cardiovascular:   Hypertension hyperlipidemia ECG has been reviewed.    Pulmonary:  Pulmonary Normal    Renal/:  Renal/ Normal     Hepatic/GI:  Hepatic/GI  Normal    Musculoskeletal:  Musculoskeletal Normal    Neurological:   CVA, residual symptoms    Endocrine:  Endocrine Normal    Psych:  Psychiatric Normal           Physical Exam  General: Well nourished, Cooperative, Alert and Oriented    Airway:  Mallampati: II   Mouth Opening: Normal  TM Distance: Normal  Tongue: Normal  Neck ROM: Normal ROM    Dental:  Intact    Musculoskeletal:Right sided weakness      Anesthesia Plan  Type of Anesthesia, risks & benefits discussed:    Anesthesia Type: Gen ETT  Intra-op Monitoring Plan: Standard ASA Monitors and Art Line  Post Op Pain Control Plan: multimodal analgesia and IV/PO Opioids PRN  Induction:  IV  Airway Plan: Video and Direct, Post-Induction  Informed Consent: Informed consent signed with the Patient and all parties understand the risks and agree with anesthesia plan.  All questions answered. Patient consented to blood products? Yes  ASA Score: 3  Day of Surgery Review of History & Physical: H&P Update referred to the surgeon/provider.    Ready For Surgery From Anesthesia Perspective.     .

## 2022-08-17 ENCOUNTER — HOSPITAL ENCOUNTER (INPATIENT)
Facility: HOSPITAL | Age: 57
LOS: 1 days | Discharge: HOME | DRG: 038 | End: 2022-08-18
Attending: SURGERY | Admitting: SURGERY
Payer: OTHER GOVERNMENT

## 2022-08-17 ENCOUNTER — ANESTHESIA (OUTPATIENT)
Dept: SURGERY | Facility: HOSPITAL | Age: 57
DRG: 038 | End: 2022-08-17
Payer: OTHER GOVERNMENT

## 2022-08-17 DIAGNOSIS — I65.29 CAROTID ARTERY STENOSIS: ICD-10-CM

## 2022-08-17 DIAGNOSIS — I65.23 BILATERAL CAROTID ARTERY STENOSIS: ICD-10-CM

## 2022-08-17 DIAGNOSIS — E78.2 MIXED HYPERLIPIDEMIA: ICD-10-CM

## 2022-08-17 LAB
ALBUMIN SERPL BCP-MCNC: 3.6 G/DL (ref 3.5–5.2)
ALP SERPL-CCNC: 67 U/L (ref 55–135)
ALT SERPL W/O P-5'-P-CCNC: 19 U/L (ref 10–44)
ANION GAP SERPL CALC-SCNC: 9 MMOL/L (ref 8–16)
AST SERPL-CCNC: 15 U/L (ref 10–40)
BACTERIA #/AREA URNS AUTO: ABNORMAL /HPF
BASOPHILS # BLD AUTO: 0.02 K/UL (ref 0–0.2)
BASOPHILS NFR BLD: 0.1 % (ref 0–1.9)
BILIRUB SERPL-MCNC: 0.6 MG/DL (ref 0.1–1)
BILIRUB UR QL STRIP: NEGATIVE
BUN SERPL-MCNC: 12 MG/DL (ref 6–20)
CALCIUM SERPL-MCNC: 8.2 MG/DL (ref 8.7–10.5)
CHLORIDE SERPL-SCNC: 106 MMOL/L (ref 95–110)
CLARITY UR REFRACT.AUTO: CLEAR
CO2 SERPL-SCNC: 23 MMOL/L (ref 23–29)
COLOR UR AUTO: YELLOW
CREAT SERPL-MCNC: 0.8 MG/DL (ref 0.5–1.4)
DIFFERENTIAL METHOD: ABNORMAL
EOSINOPHIL # BLD AUTO: 0.1 K/UL (ref 0–0.5)
EOSINOPHIL NFR BLD: 1 % (ref 0–8)
ERYTHROCYTE [DISTWIDTH] IN BLOOD BY AUTOMATED COUNT: 12.8 % (ref 11.5–14.5)
EST. GFR  (NO RACE VARIABLE): >60 ML/MIN/1.73 M^2
GLUCOSE SERPL-MCNC: 138 MG/DL (ref 70–110)
GLUCOSE UR QL STRIP: NEGATIVE
HCT VFR BLD AUTO: 41.3 % (ref 40–54)
HGB BLD-MCNC: 13.3 G/DL (ref 14–18)
HGB UR QL STRIP: ABNORMAL
IMM GRANULOCYTES # BLD AUTO: 0.11 K/UL (ref 0–0.04)
IMM GRANULOCYTES NFR BLD AUTO: 0.8 % (ref 0–0.5)
KETONES UR QL STRIP: NEGATIVE
LEUKOCYTE ESTERASE UR QL STRIP: NEGATIVE
LYMPHOCYTES # BLD AUTO: 1.2 K/UL (ref 1–4.8)
LYMPHOCYTES NFR BLD: 8.5 % (ref 18–48)
MAGNESIUM SERPL-MCNC: 1.8 MG/DL (ref 1.6–2.6)
MCH RBC QN AUTO: 30.1 PG (ref 27–31)
MCHC RBC AUTO-ENTMCNC: 32.2 G/DL (ref 32–36)
MCV RBC AUTO: 93 FL (ref 82–98)
MICROSCOPIC COMMENT: ABNORMAL
MONOCYTES # BLD AUTO: 0.4 K/UL (ref 0.3–1)
MONOCYTES NFR BLD: 2.5 % (ref 4–15)
NEUTROPHILS # BLD AUTO: 12.7 K/UL (ref 1.8–7.7)
NEUTROPHILS NFR BLD: 87.1 % (ref 38–73)
NITRITE UR QL STRIP: NEGATIVE
NRBC BLD-RTO: 0 /100 WBC
PH UR STRIP: 6 [PH] (ref 5–8)
PHOSPHATE SERPL-MCNC: 2.7 MG/DL (ref 2.7–4.5)
PLATELET # BLD AUTO: 210 K/UL (ref 150–450)
PMV BLD AUTO: 11.1 FL (ref 9.2–12.9)
POC ACTIVATED CLOTTING TIME K: 121 SEC (ref 74–137)
POC ACTIVATED CLOTTING TIME K: 219 SEC (ref 74–137)
POC ACTIVATED CLOTTING TIME K: 219 SEC (ref 74–137)
POCT GLUCOSE: 116 MG/DL (ref 70–110)
POTASSIUM SERPL-SCNC: 4.1 MMOL/L (ref 3.5–5.1)
PROT SERPL-MCNC: 6.2 G/DL (ref 6–8.4)
PROT UR QL STRIP: NEGATIVE
RBC # BLD AUTO: 4.42 M/UL (ref 4.6–6.2)
RBC #/AREA URNS AUTO: 5 /HPF (ref 0–4)
SAMPLE: ABNORMAL
SAMPLE: ABNORMAL
SAMPLE: NORMAL
SODIUM SERPL-SCNC: 138 MMOL/L (ref 136–145)
SP GR UR STRIP: 1.02 (ref 1–1.03)
URN SPEC COLLECT METH UR: ABNORMAL
WBC # BLD AUTO: 14.54 K/UL (ref 3.9–12.7)
WBC #/AREA URNS AUTO: 1 /HPF (ref 0–5)

## 2022-08-17 PROCEDURE — 99291 PR CRITICAL CARE, E/M 30-74 MINUTES: ICD-10-PCS | Mod: ,,, | Performed by: ANESTHESIOLOGY

## 2022-08-17 PROCEDURE — 81001 URINALYSIS AUTO W/SCOPE: CPT | Performed by: STUDENT IN AN ORGANIZED HEALTH CARE EDUCATION/TRAINING PROGRAM

## 2022-08-17 PROCEDURE — 25000003 PHARM REV CODE 250: Performed by: NURSE ANESTHETIST, CERTIFIED REGISTERED

## 2022-08-17 PROCEDURE — 94799 UNLISTED PULMONARY SVC/PX: CPT

## 2022-08-17 PROCEDURE — 80053 COMPREHEN METABOLIC PANEL: CPT | Performed by: STUDENT IN AN ORGANIZED HEALTH CARE EDUCATION/TRAINING PROGRAM

## 2022-08-17 PROCEDURE — 85025 COMPLETE CBC W/AUTO DIFF WBC: CPT | Performed by: STUDENT IN AN ORGANIZED HEALTH CARE EDUCATION/TRAINING PROGRAM

## 2022-08-17 PROCEDURE — C1768 GRAFT, VASCULAR: HCPCS | Performed by: SURGERY

## 2022-08-17 PROCEDURE — D9220A PRA ANESTHESIA: Mod: ANES,,, | Performed by: ANESTHESIOLOGY

## 2022-08-17 PROCEDURE — 36000706: Performed by: SURGERY

## 2022-08-17 PROCEDURE — 25000003 PHARM REV CODE 250: Performed by: STUDENT IN AN ORGANIZED HEALTH CARE EDUCATION/TRAINING PROGRAM

## 2022-08-17 PROCEDURE — D9220A PRA ANESTHESIA: Mod: CRNA,,, | Performed by: NURSE ANESTHETIST, CERTIFIED REGISTERED

## 2022-08-17 PROCEDURE — 83735 ASSAY OF MAGNESIUM: CPT | Performed by: STUDENT IN AN ORGANIZED HEALTH CARE EDUCATION/TRAINING PROGRAM

## 2022-08-17 PROCEDURE — 63600175 PHARM REV CODE 636 W HCPCS: Performed by: STUDENT IN AN ORGANIZED HEALTH CARE EDUCATION/TRAINING PROGRAM

## 2022-08-17 PROCEDURE — 35301 PR THROMBOENDARTECTMY NECK,NECK INCIS: ICD-10-PCS | Mod: LT,,, | Performed by: SURGERY

## 2022-08-17 PROCEDURE — 37000008 HC ANESTHESIA 1ST 15 MINUTES: Performed by: SURGERY

## 2022-08-17 PROCEDURE — 94761 N-INVAS EAR/PLS OXIMETRY MLT: CPT

## 2022-08-17 PROCEDURE — 27000221 HC OXYGEN, UP TO 24 HOURS

## 2022-08-17 PROCEDURE — 63600175 PHARM REV CODE 636 W HCPCS: Performed by: SURGERY

## 2022-08-17 PROCEDURE — 36620 INSERTION CATHETER ARTERY: CPT | Mod: 59,,, | Performed by: ANESTHESIOLOGY

## 2022-08-17 PROCEDURE — 84100 ASSAY OF PHOSPHORUS: CPT | Performed by: STUDENT IN AN ORGANIZED HEALTH CARE EDUCATION/TRAINING PROGRAM

## 2022-08-17 PROCEDURE — D9220A PRA ANESTHESIA: ICD-10-PCS | Mod: CRNA,,, | Performed by: NURSE ANESTHETIST, CERTIFIED REGISTERED

## 2022-08-17 PROCEDURE — 99291 CRITICAL CARE FIRST HOUR: CPT | Mod: ,,, | Performed by: ANESTHESIOLOGY

## 2022-08-17 PROCEDURE — 35301 RECHANNELING OF ARTERY: CPT | Mod: LT,,, | Performed by: SURGERY

## 2022-08-17 PROCEDURE — 36000707: Performed by: SURGERY

## 2022-08-17 PROCEDURE — 36620 PR INSERT CATH,ART,PERCUT,SHORTTERM: ICD-10-PCS | Mod: 59,,, | Performed by: ANESTHESIOLOGY

## 2022-08-17 PROCEDURE — 63600175 PHARM REV CODE 636 W HCPCS: Performed by: NURSE ANESTHETIST, CERTIFIED REGISTERED

## 2022-08-17 PROCEDURE — 37000009 HC ANESTHESIA EA ADD 15 MINS: Performed by: SURGERY

## 2022-08-17 PROCEDURE — 99900035 HC TECH TIME PER 15 MIN (STAT)

## 2022-08-17 PROCEDURE — C1729 CATH, DRAINAGE: HCPCS | Performed by: SURGERY

## 2022-08-17 PROCEDURE — 20000000 HC ICU ROOM

## 2022-08-17 PROCEDURE — D9220A PRA ANESTHESIA: ICD-10-PCS | Mod: ANES,,, | Performed by: ANESTHESIOLOGY

## 2022-08-17 PROCEDURE — 27201423 OPTIME MED/SURG SUP & DEVICES STERILE SUPPLY: Performed by: SURGERY

## 2022-08-17 DEVICE — GRAFT VAS GUARD 0.8X8CM BOVINE: Type: IMPLANTABLE DEVICE | Site: CAROTID | Status: FUNCTIONAL

## 2022-08-17 RX ORDER — PRAVASTATIN SODIUM 20 MG/1
80 TABLET ORAL DAILY
Status: DISCONTINUED | OUTPATIENT
Start: 2022-08-17 | End: 2022-08-18 | Stop reason: HOSPADM

## 2022-08-17 RX ORDER — CEFAZOLIN SODIUM/WATER 2 G/20 ML
2 SYRINGE (ML) INTRAVENOUS ONCE
Status: COMPLETED | OUTPATIENT
Start: 2022-08-17 | End: 2022-08-17

## 2022-08-17 RX ORDER — FENTANYL CITRATE 50 UG/ML
INJECTION, SOLUTION INTRAMUSCULAR; INTRAVENOUS
Status: DISCONTINUED | OUTPATIENT
Start: 2022-08-17 | End: 2022-08-17

## 2022-08-17 RX ORDER — MORPHINE SULFATE 4 MG/ML
3 INJECTION, SOLUTION INTRAMUSCULAR; INTRAVENOUS
Status: DISCONTINUED | OUTPATIENT
Start: 2022-08-17 | End: 2022-08-18 | Stop reason: HOSPADM

## 2022-08-17 RX ORDER — SODIUM CHLORIDE 9 MG/ML
INJECTION, SOLUTION INTRAVENOUS CONTINUOUS
Status: DISCONTINUED | OUTPATIENT
Start: 2022-08-17 | End: 2022-08-17

## 2022-08-17 RX ORDER — ONDANSETRON 2 MG/ML
4 INJECTION INTRAMUSCULAR; INTRAVENOUS DAILY PRN
Status: CANCELLED | OUTPATIENT
Start: 2022-08-17

## 2022-08-17 RX ORDER — MUPIROCIN 20 MG/G
OINTMENT TOPICAL 2 TIMES DAILY
Status: DISCONTINUED | OUTPATIENT
Start: 2022-08-17 | End: 2022-08-18 | Stop reason: HOSPADM

## 2022-08-17 RX ORDER — EPHEDRINE SULFATE 50 MG/ML
INJECTION, SOLUTION INTRAVENOUS
Status: DISCONTINUED | OUTPATIENT
Start: 2022-08-17 | End: 2022-08-17

## 2022-08-17 RX ORDER — ROCURONIUM BROMIDE 10 MG/ML
INJECTION, SOLUTION INTRAVENOUS
Status: DISCONTINUED | OUTPATIENT
Start: 2022-08-17 | End: 2022-08-17

## 2022-08-17 RX ORDER — PROTAMINE SULFATE 10 MG/ML
INJECTION, SOLUTION INTRAVENOUS
Status: DISCONTINUED | OUTPATIENT
Start: 2022-08-17 | End: 2022-08-17

## 2022-08-17 RX ORDER — LIDOCAINE HYDROCHLORIDE 10 MG/ML
1 INJECTION, SOLUTION EPIDURAL; INFILTRATION; INTRACAUDAL; PERINEURAL ONCE AS NEEDED
Status: DISCONTINUED | OUTPATIENT
Start: 2022-08-17 | End: 2022-08-17 | Stop reason: HOSPADM

## 2022-08-17 RX ORDER — FENTANYL CITRATE 50 UG/ML
25 INJECTION, SOLUTION INTRAMUSCULAR; INTRAVENOUS EVERY 5 MIN PRN
Status: CANCELLED | OUTPATIENT
Start: 2022-08-17

## 2022-08-17 RX ORDER — LIDOCAINE HYDROCHLORIDE 20 MG/ML
INJECTION INTRAVENOUS
Status: DISCONTINUED | OUTPATIENT
Start: 2022-08-17 | End: 2022-08-17

## 2022-08-17 RX ORDER — ONDANSETRON 8 MG/1
8 TABLET, ORALLY DISINTEGRATING ORAL EVERY 8 HOURS PRN
Status: DISCONTINUED | OUTPATIENT
Start: 2022-08-17 | End: 2022-08-18 | Stop reason: HOSPADM

## 2022-08-17 RX ORDER — PHENYLEPHRINE HYDROCHLORIDE 10 MG/ML
INJECTION INTRAVENOUS
Status: DISCONTINUED | OUTPATIENT
Start: 2022-08-17 | End: 2022-08-17

## 2022-08-17 RX ORDER — OXYCODONE HYDROCHLORIDE 5 MG/1
5 TABLET ORAL EVERY 4 HOURS PRN
Status: DISCONTINUED | OUTPATIENT
Start: 2022-08-17 | End: 2022-08-18 | Stop reason: HOSPADM

## 2022-08-17 RX ORDER — ASPIRIN 81 MG/1
81 TABLET ORAL EVERY MORNING
Status: DISCONTINUED | OUTPATIENT
Start: 2022-08-17 | End: 2022-08-18 | Stop reason: HOSPADM

## 2022-08-17 RX ORDER — ACETAMINOPHEN 500 MG
1000 TABLET ORAL
Status: DISCONTINUED | OUTPATIENT
Start: 2022-08-17 | End: 2022-08-17 | Stop reason: HOSPADM

## 2022-08-17 RX ORDER — VASOPRESSIN 20 [USP'U]/ML
INJECTION, SOLUTION INTRAMUSCULAR; SUBCUTANEOUS
Status: DISCONTINUED | OUTPATIENT
Start: 2022-08-17 | End: 2022-08-17

## 2022-08-17 RX ORDER — NICARDIPINE HYDROCHLORIDE 0.2 MG/ML
0-15 INJECTION INTRAVENOUS CONTINUOUS
Status: DISCONTINUED | OUTPATIENT
Start: 2022-08-17 | End: 2022-08-18

## 2022-08-17 RX ORDER — PHENYLEPHRINE HCL IN 0.9% NACL 1 MG/10 ML
SYRINGE (ML) INTRAVENOUS
Status: DISCONTINUED | OUTPATIENT
Start: 2022-08-17 | End: 2022-08-17

## 2022-08-17 RX ORDER — DEXMEDETOMIDINE HYDROCHLORIDE 100 UG/ML
INJECTION, SOLUTION INTRAVENOUS
Status: DISCONTINUED | OUTPATIENT
Start: 2022-08-17 | End: 2022-08-17

## 2022-08-17 RX ORDER — HEPARIN SODIUM 1000 [USP'U]/ML
INJECTION, SOLUTION INTRAVENOUS; SUBCUTANEOUS
Status: DISCONTINUED | OUTPATIENT
Start: 2022-08-17 | End: 2022-08-17 | Stop reason: HOSPADM

## 2022-08-17 RX ORDER — HEPARIN SODIUM 1000 [USP'U]/ML
INJECTION, SOLUTION INTRAVENOUS; SUBCUTANEOUS
Status: DISCONTINUED | OUTPATIENT
Start: 2022-08-17 | End: 2022-08-17

## 2022-08-17 RX ORDER — ACETAMINOPHEN 325 MG/1
650 TABLET ORAL EVERY 4 HOURS PRN
Status: DISCONTINUED | OUTPATIENT
Start: 2022-08-17 | End: 2022-08-18 | Stop reason: HOSPADM

## 2022-08-17 RX ORDER — SODIUM CHLORIDE 0.9 % (FLUSH) 0.9 %
10 SYRINGE (ML) INJECTION
Status: DISCONTINUED | OUTPATIENT
Start: 2022-08-17 | End: 2022-08-18 | Stop reason: HOSPADM

## 2022-08-17 RX ORDER — EPINEPHRINE 1 MG/ML
INJECTION, SOLUTION INTRACARDIAC; INTRAMUSCULAR; INTRAVENOUS; SUBCUTANEOUS
Status: DISCONTINUED | OUTPATIENT
Start: 2022-08-17 | End: 2022-08-17

## 2022-08-17 RX ORDER — HYDROMORPHONE HYDROCHLORIDE 1 MG/ML
1 INJECTION, SOLUTION INTRAMUSCULAR; INTRAVENOUS; SUBCUTANEOUS EVERY 4 HOURS PRN
Status: DISCONTINUED | OUTPATIENT
Start: 2022-08-17 | End: 2022-08-18

## 2022-08-17 RX ORDER — PROPOFOL 10 MG/ML
VIAL (ML) INTRAVENOUS
Status: DISCONTINUED | OUTPATIENT
Start: 2022-08-17 | End: 2022-08-17

## 2022-08-17 RX ORDER — ONDANSETRON 2 MG/ML
INJECTION INTRAMUSCULAR; INTRAVENOUS
Status: DISCONTINUED | OUTPATIENT
Start: 2022-08-17 | End: 2022-08-17

## 2022-08-17 RX ORDER — MIDAZOLAM HYDROCHLORIDE 1 MG/ML
INJECTION INTRAMUSCULAR; INTRAVENOUS
Status: DISCONTINUED | OUTPATIENT
Start: 2022-08-17 | End: 2022-08-17

## 2022-08-17 RX ORDER — OXYCODONE HYDROCHLORIDE 5 MG/1
5 TABLET ORAL EVERY 4 HOURS PRN
Status: DISCONTINUED | OUTPATIENT
Start: 2022-08-17 | End: 2022-08-17

## 2022-08-17 RX ORDER — DEXAMETHASONE SODIUM PHOSPHATE 4 MG/ML
INJECTION, SOLUTION INTRA-ARTICULAR; INTRALESIONAL; INTRAMUSCULAR; INTRAVENOUS; SOFT TISSUE
Status: DISCONTINUED | OUTPATIENT
Start: 2022-08-17 | End: 2022-08-17

## 2022-08-17 RX ORDER — ACETAMINOPHEN 325 MG/1
650 TABLET ORAL EVERY 4 HOURS PRN
Status: DISCONTINUED | OUTPATIENT
Start: 2022-08-17 | End: 2022-08-17

## 2022-08-17 RX ORDER — SODIUM CHLORIDE 9 MG/ML
INJECTION, SOLUTION INTRAVENOUS CONTINUOUS PRN
Status: DISCONTINUED | OUTPATIENT
Start: 2022-08-17 | End: 2022-08-17

## 2022-08-17 RX ADMIN — EPHEDRINE SULFATE 10 MG: 50 INJECTION INTRAVENOUS at 12:08

## 2022-08-17 RX ADMIN — EPINEPHRINE 10 MCG: 1 INJECTION, SOLUTION, CONCENTRATE INTRAVENOUS at 11:08

## 2022-08-17 RX ADMIN — SODIUM CHLORIDE: 0.9 INJECTION, SOLUTION INTRAVENOUS at 10:08

## 2022-08-17 RX ADMIN — SODIUM CHLORIDE, SODIUM LACTATE, POTASSIUM CHLORIDE, AND CALCIUM CHLORIDE 500 ML: .6; .31; .03; .02 INJECTION, SOLUTION INTRAVENOUS at 04:08

## 2022-08-17 RX ADMIN — DEXAMETHASONE SODIUM PHOSPHATE 8 MG: 4 INJECTION, SOLUTION INTRAMUSCULAR; INTRAVENOUS at 11:08

## 2022-08-17 RX ADMIN — SODIUM CHLORIDE, SODIUM GLUCONATE, SODIUM ACETATE, POTASSIUM CHLORIDE, MAGNESIUM CHLORIDE, SODIUM PHOSPHATE, DIBASIC, AND POTASSIUM PHOSPHATE: .53; .5; .37; .037; .03; .012; .00082 INJECTION, SOLUTION INTRAVENOUS at 11:08

## 2022-08-17 RX ADMIN — PHENYLEPHRINE HYDROCHLORIDE 100 MCG: 10 INJECTION INTRAVENOUS at 12:08

## 2022-08-17 RX ADMIN — HEPARIN SODIUM 8000 UNITS: 1000 INJECTION, SOLUTION INTRAVENOUS; SUBCUTANEOUS at 11:08

## 2022-08-17 RX ADMIN — PROPOFOL 100 MG: 10 INJECTION, EMULSION INTRAVENOUS at 10:08

## 2022-08-17 RX ADMIN — HYDROMORPHONE HYDROCHLORIDE 1 MG: 1 INJECTION, SOLUTION INTRAMUSCULAR; INTRAVENOUS; SUBCUTANEOUS at 02:08

## 2022-08-17 RX ADMIN — ONDANSETRON 4 MG: 2 INJECTION INTRAMUSCULAR; INTRAVENOUS at 12:08

## 2022-08-17 RX ADMIN — DEXMEDETOMIDINE HYDROCHLORIDE 12 MCG: 100 INJECTION, SOLUTION, CONCENTRATE INTRAVENOUS at 01:08

## 2022-08-17 RX ADMIN — MIDAZOLAM HYDROCHLORIDE 2 MG: 1 INJECTION, SOLUTION INTRAMUSCULAR; INTRAVENOUS at 10:08

## 2022-08-17 RX ADMIN — VASOPRESSIN 2 UNITS: 20 INJECTION, SOLUTION INTRAMUSCULAR; SUBCUTANEOUS at 11:08

## 2022-08-17 RX ADMIN — PHENYLEPHRINE HYDROCHLORIDE 100 MCG: 10 INJECTION INTRAVENOUS at 11:08

## 2022-08-17 RX ADMIN — GLYCOPYRROLATE 0.2 MG: 0.2 INJECTION, SOLUTION INTRAMUSCULAR; INTRAVITREAL at 11:08

## 2022-08-17 RX ADMIN — EPHEDRINE SULFATE 10 MG: 50 INJECTION INTRAVENOUS at 11:08

## 2022-08-17 RX ADMIN — PROTAMINE SULFATE 65 MG: 10 INJECTION, SOLUTION INTRAVENOUS at 12:08

## 2022-08-17 RX ADMIN — PRAVASTATIN SODIUM 80 MG: 20 TABLET ORAL at 05:08

## 2022-08-17 RX ADMIN — VASOPRESSIN 1 UNITS: 20 INJECTION, SOLUTION INTRAMUSCULAR; SUBCUTANEOUS at 11:08

## 2022-08-17 RX ADMIN — FENTANYL CITRATE 150 MCG: 50 INJECTION, SOLUTION INTRAMUSCULAR; INTRAVENOUS at 10:08

## 2022-08-17 RX ADMIN — SUGAMMADEX 200 MG: 100 INJECTION, SOLUTION INTRAVENOUS at 01:08

## 2022-08-17 RX ADMIN — EPHEDRINE SULFATE 5 MG: 50 INJECTION INTRAVENOUS at 12:08

## 2022-08-17 RX ADMIN — REMIFENTANIL HYDROCHLORIDE 0.15 MCG/KG/MIN: 1 INJECTION, POWDER, LYOPHILIZED, FOR SOLUTION INTRAVENOUS at 10:08

## 2022-08-17 RX ADMIN — Medication 2 G: at 11:08

## 2022-08-17 RX ADMIN — HEPARIN SODIUM 1000 UNITS: 1000 INJECTION, SOLUTION INTRAVENOUS; SUBCUTANEOUS at 12:08

## 2022-08-17 RX ADMIN — SODIUM CHLORIDE 0.3 MCG/KG/MIN: 9 INJECTION, SOLUTION INTRAVENOUS at 10:08

## 2022-08-17 RX ADMIN — Medication 50 MCG: at 12:08

## 2022-08-17 RX ADMIN — LIDOCAINE HYDROCHLORIDE 50 MG: 20 INJECTION, SOLUTION INTRAVENOUS at 10:08

## 2022-08-17 RX ADMIN — ROCURONIUM BROMIDE 80 MG: 10 INJECTION, SOLUTION INTRAVENOUS at 10:08

## 2022-08-17 RX ADMIN — MUPIROCIN: 20 OINTMENT TOPICAL at 09:08

## 2022-08-17 RX ADMIN — ASPIRIN 81 MG: 81 TABLET, COATED ORAL at 05:08

## 2022-08-17 NOTE — PLAN OF CARE
Chart reviewed. Pre-op nursing care and safe surgery checklist complete. Family at bedside. Patient belongings given to family.

## 2022-08-17 NOTE — ANESTHESIA PROCEDURE NOTES
Intubation    Date/Time: 8/17/2022 10:58 AM  Performed by: Franco Rene MD  Authorized by: Carlos Mccloud MD     Intubation:     Induction:  Intravenous    Intubated:  Postinduction    Mask Ventilation:  Easy mask    Attempts:  1    Attempted By:  Resident anesthesiologist    Method of Intubation:  Direct    Blade:  Gonsalez 2    Laryngeal View Grade: Grade I - full view of cords      Difficult Airway Encountered?: No      Complications:  None    Airway Device:  Oral endotracheal tube    Airway Device Size:  7.5    Style/Cuff Inflation:  Cuffed (inflated to minimal occlusive pressure)    Tube secured:  22    Secured at:  The lips    Placement Verified By:  Capnometry    Complicating Factors:  None    Findings Post-Intubation:  Atraumatic/condition of teeth unchanged and BS equal bilateral

## 2022-08-17 NOTE — INTERVAL H&P NOTE
The patient has been examined and the H&P has been reviewed:    I concur with the findings and no changes have occurred since H&P was written.    Surgery risks, benefits and alternative options discussed and understood by patient/family.          There are no hospital problems to display for this patient.    Ken Garcia MD PGY7  Vascular Surgery Fellow  [phone]

## 2022-08-17 NOTE — HPI
Adarsh Wheeler is a 55 yo male with hx of HTN, HLD, and prior RUE injury. He was admitted in December 2021 with subacute L MCA infarct presenting with right hemiparesis and aphasia. He was outside of tPA window by time of presentation. He is now s/p L carotid endarterectomy.     Arrived to SICU HDS, pain well controlled.

## 2022-08-17 NOTE — NURSING
SICU PLAN OF CARE NOTE    Dx: Bilateral carotid artery stenosis    Shift Events: VSS, 1L LR bolus for hypotension. See nursing notes for details. Pt due to void at 2115.    Gtts: none    Neuro: AAO x4, Follows Commands and Moves All Extremities, R sided facial drooping from previous stroke, R sided weakness from previous stroke.    Cardiac: NSR    Respiratory: Nasal Cannula 2 lPM    GI: Cardiac Diet    Labs/Accuchecks: daily labs    Skin: no skin breakdown noted, pt turned and repositioned during shift. L neck incision remains CDI. Bed plugged in, wheels locked, call light in reach. Waffle mattress inflated, SCDs on, foams in place, family at bedside and updated on pt plan of care.

## 2022-08-17 NOTE — SUBJECTIVE & OBJECTIVE
Follow-up For: Procedure(s) (LRB):  ENDARTERECTOMY-CAROTID (Left)    Post-Operative Day: Day of Surgery     History reviewed. No pertinent past medical history.    Past Surgical History:   Procedure Laterality Date    EYE SURGERY      FOOT SURGERY      HAND SURGERY         Review of patient's allergies indicates:   Allergen Reactions    Escitalopram Nausea And Vomiting    Mirtazapine Rash       Family History       Problem Relation (Age of Onset)    COPD Father    Cancer Sister    Stroke Maternal Uncle          Tobacco Use    Smoking status: Former Smoker     Types: Pipe     Quit date: 2014     Years since quittin.6    Smokeless tobacco: Never Used   Substance and Sexual Activity    Alcohol use: Not Currently    Drug use: Never    Sexual activity: Not Currently     Birth control/protection: None      Review of Systems   Constitutional:  Negative for chills and fever.   Respiratory:  Negative for cough and shortness of breath.    Cardiovascular:  Negative for chest pain.   Gastrointestinal:  Negative for nausea and vomiting.   Musculoskeletal:  Negative for neck pain.   Neurological:  Negative for weakness.   Psychiatric/Behavioral:  Negative for agitation.    Objective:     Vital Signs (Most Recent):  Temp: 98.6 °F (37 °C) (22 1415)  Pulse: 68 (22 1415)  Resp: 20 (22 1423)  BP: (!) 94/53 (22 1415)  SpO2: 96 % (22 1415)   Vital Signs (24h Range):  Temp:  [98.6 °F (37 °C)-98.8 °F (37.1 °C)] 98.6 °F (37 °C)  Pulse:  [60-68] 68  Resp:  [18-20] 20  SpO2:  [96 %-99 %] 96 %  BP: ()/(53-83) 94/53  Arterial Line BP: (121)/(52) 121/52     Weight: 82.6 kg (182 lb)  Body mass index is 25.38 kg/m².      Intake/Output Summary (Last 24 hours) at 2022 1446  Last data filed at 2022 1319  Gross per 24 hour   Intake 1500 ml   Output --   Net 1500 ml       Physical Exam  Constitutional:       General: He is not in acute distress.     Appearance: He is not ill-appearing.   HENT:       Head: Normocephalic.      Mouth/Throat:      Mouth: Mucous membranes are moist.      Pharynx: Oropharynx is clear.   Eyes:      Extraocular Movements: Extraocular movements intact.      Conjunctiva/sclera: Conjunctivae normal.   Neck:      Comments: L neck incision site c/d/i  Not TTP  Cardiovascular:      Rate and Rhythm: Normal rate and regular rhythm.      Pulses: Normal pulses.   Pulmonary:      Effort: Pulmonary effort is normal. No respiratory distress.   Abdominal:      General: Abdomen is flat. There is no distension.      Palpations: Abdomen is soft.      Tenderness: There is no abdominal tenderness.   Musculoskeletal:      Cervical back: Neck supple.      Right lower leg: No edema.      Left lower leg: No edema.   Skin:     General: Skin is warm and dry.   Neurological:      Mental Status: He is alert.      Comments: Mild R>L UE weakness       Vents:       Lines/Drains/Airways       Arterial Line  Duration             Arterial Line 08/17/22 1102 Left Radial <1 day              Peripheral Intravenous Line  Duration                  Peripheral IV - Single Lumen 08/17/22 0920 20 G Posterior;Right Forearm <1 day         Peripheral IV - Single Lumen 08/17/22 18 G Left Antecubital <1 day                    Significant Labs:    CBC/Anemia Profile:  Recent Labs   Lab 08/17/22  1411   WBC 14.54*   HGB 13.3*   HCT 41.3      MCV 93   RDW 12.8        Chemistries:  No results for input(s): NA, K, CL, CO2, BUN, CREATININE, CALCIUM, ALBUMIN, PROT, BILITOT, ALKPHOS, ALT, AST, GLUCOSE, MG, PHOS in the last 48 hours.    All pertinent labs within the past 24 hours have been reviewed.    Significant Imaging: I have reviewed all pertinent imaging results/findings within the past 24 hours.

## 2022-08-17 NOTE — INTERVAL H&P NOTE
The patient has been examined and the H&P has been reviewed:  I have personally examined the patient, and there are no changes since the H and P was written.     risks, benefits and alternative options discussed and understood by patient/family.          There are no hospital problems to display for this patient.

## 2022-08-17 NOTE — ASSESSMENT & PLAN NOTE
  Neuro/Psych:   -- Sedation: none  -- Pain: PRN tylenol, oxy, and dilaudid             Cards:   -- continue aspirin 81mg  -- continue pravastatin 80mg  -- q1hr neurovascular checks post-op      Pulm:   -- goal O2 sat  >90%  -- wean supplemental oxygen as tolerated      Renal:  -- CMP post-op pending      FEN / GI:   -- Replace lytes as needed  -- cardiac diet      ID:   -- ancef intra-op  -- no s/s infection      Heme/Onc:   --Hgb post op pending  --Daily CBC      Endo:   -- Gluc goal 140-180      PPx:   Feeding: cardiac diet  Analgesia/Sedation: PRN anagelsia  Thromboembolic prevention:   HOB >30: yes  Stress Ulcer ppx: none  Glucose control: Critical care goal 140-180 g/dl     Lines/Drains/Airway: PIV, art line      Dispo/Code Status/Palliative:   -- SICU / Full Code

## 2022-08-17 NOTE — H&P
[facility]  Critical Care - Surgery  History & Physical    Patient Name: Adarsh Wheeler  MRN: [id number]  Admission Date: 2022  Code Status: Full Code  Attending Physician: ROSANNA Servin III, MD   Primary Care Provider: Stefan Woods MD   Principal Problem: Bilateral carotid artery stenosis    Subjective:     HPI:  Adarsh Wheeler is a 55 yo male with hx of HTN, HLD, and prior RUE injury. He was admitted in 2021 with subacute L MCA infarct presenting with right hemiparesis and aphasia. He was outside of tPA window by time of presentation. He is now s/p L carotid endarterectomy.       Hospital/ICU Course:  No notes on file    Follow-up For: Procedure(s) (LRB):  ENDARTERECTOMY-CAROTID (Left)    Post-Operative Day: Day of Surgery     History reviewed. No pertinent past medical history.    Past Surgical History:   Procedure Laterality Date    EYE SURGERY      FOOT SURGERY      HAND SURGERY         Review of patient's allergies indicates:   Allergen Reactions    Escitalopram Nausea And Vomiting    Mirtazapine Rash       Family History       Problem Relation (Age of Onset)    COPD Father    Cancer Sister    Stroke Maternal Uncle          Tobacco Use    Smoking status: Former Smoker     Types: Pipe     Quit date: 2014     Years since quittin.6    Smokeless tobacco: Never Used   Substance and Sexual Activity    Alcohol use: Not Currently    Drug use: Never    Sexual activity: Not Currently     Birth control/protection: None      Review of Systems   Constitutional:  Negative for chills and fever.   Respiratory:  Negative for cough and shortness of breath.    Cardiovascular:  Negative for chest pain.   Gastrointestinal:  Negative for nausea and vomiting.   Musculoskeletal:  Negative for neck pain.   Neurological:  Negative for weakness.   Psychiatric/Behavioral:  Negative for agitation.    Objective:     Vital Signs (Most Recent):  Temp: 98.6 °F (37 °C)  (08/17/22 1415)  Pulse: 68 (08/17/22 1415)  Resp: 20 (08/17/22 1423)  BP: (!) 94/53 (08/17/22 1415)  SpO2: 96 % (08/17/22 1415)   Vital Signs (24h Range):  Temp:  [98.6 °F (37 °C)-98.8 °F (37.1 °C)] 98.6 °F (37 °C)  Pulse:  [60-68] 68  Resp:  [18-20] 20  SpO2:  [96 %-99 %] 96 %  BP: ()/(53-83) 94/53  Arterial Line BP: (121)/(52) 121/52     Weight: 82.6 kg (182 lb)  Body mass index is 25.38 kg/m².      Intake/Output Summary (Last 24 hours) at 8/17/2022 1446  Last data filed at 8/17/2022 1319  Gross per 24 hour   Intake 1500 ml   Output --   Net 1500 ml       Physical Exam  Constitutional:       General: He is not in acute distress.     Appearance: He is not ill-appearing.   HENT:      Head: Normocephalic.      Mouth/Throat:      Mouth: Mucous membranes are moist.      Pharynx: Oropharynx is clear.   Eyes:      Extraocular Movements: Extraocular movements intact.      Conjunctiva/sclera: Conjunctivae normal.   Neck:      Comments: L neck incision site c/d/i  Not TTP  Cardiovascular:      Rate and Rhythm: Normal rate and regular rhythm.      Pulses: Normal pulses.   Pulmonary:      Effort: Pulmonary effort is normal. No respiratory distress.   Abdominal:      General: Abdomen is flat. There is no distension.      Palpations: Abdomen is soft.      Tenderness: There is no abdominal tenderness.   Musculoskeletal:      Cervical back: Neck supple.      Right lower leg: No edema.      Left lower leg: No edema.   Skin:     General: Skin is warm and dry.   Neurological:      Mental Status: He is alert.      Comments: Mild R>L UE weakness       Vents:       Lines/Drains/Airways       Arterial Line  Duration             Arterial Line 08/17/22 1102 Left Radial <1 day              Peripheral Intravenous Line  Duration                  Peripheral IV - Single Lumen 08/17/22 0920 20 G Posterior;Right Forearm <1 day         Peripheral IV - Single Lumen 08/17/22 18 G Left Antecubital <1 day                    Significant  Labs:    CBC/Anemia Profile:  Recent Labs   Lab 08/17/22  1411   WBC 14.54*   HGB 13.3*   HCT 41.3      MCV 93   RDW 12.8        Chemistries:  No results for input(s): NA, K, CL, CO2, BUN, CREATININE, CALCIUM, ALBUMIN, PROT, BILITOT, ALKPHOS, ALT, AST, GLUCOSE, MG, PHOS in the last 48 hours.    All pertinent labs within the past 24 hours have been reviewed.    Significant Imaging: I have reviewed all pertinent imaging results/findings within the past 24 hours.    Assessment/Plan:     * Bilateral carotid artery stenosis      Neuro/Psych:   -- Sedation: none  -- Pain: PRN tylenol, oxy, and dilaudid             Cards:   -- continue aspirin 81mg  -- continue pravastatin 80mg  -- q1hr neurovascular checks post-op      Pulm:   -- goal O2 sat  >90%  -- wean supplemental oxygen as tolerated      Renal:  -- CMP post-op pending      FEN / GI:   -- Replace lytes as needed  -- cardiac diet      ID:   -- ancef intra-op  -- no s/s infection      Heme/Onc:   --Hgb post op pending  --Daily CBC      Endo:   -- Gluc goal 140-180      PPx:   Feeding: cardiac diet  Analgesia/Sedation: PRN anagelsia  Thromboembolic prevention:   HOB >30: yes  Stress Ulcer ppx: none  Glucose control: Critical care goal 140-180 g/dl     Lines/Drains/Airway: PIV, art line      Dispo/Code Status/Palliative:   -- SICU / Full Code         Critical care was time spent personally by me on the following activities: development of treatment plan with patient or surrogate and bedside caregivers, discussions with consultants, evaluation of patient's response to treatment, examination of patient, ordering and performing treatments and interventions, ordering and review of laboratory studies, ordering and review of radiographic studies, pulse oximetry, re-evaluation of patient's condition.  This critical care time did not overlap with that of any other provider or involve time for any procedures.     Geraldine Alfaro, DO  Critical Care -  Surgery  [facility]

## 2022-08-17 NOTE — TRANSFER OF CARE
"Anesthesia Transfer of Care Note    Patient: Adarsh Wheeler    Procedure(s) Performed: Procedure(s) (LRB):  ENDARTERECTOMY-CAROTID (Left)    Patient location: ICU    Anesthesia Type: general    Transport from OR: Transported from OR on 6-10 L/min O2 by face mask with adequate spontaneous ventilation. Continuous ECG monitoring in transport. Continuous SpO2 monitoring in transport. Continuos invasive BP monitoring in transport    Post pain: adequate analgesia    Post assessment: no apparent anesthetic complications    Post vital signs: stable    Level of consciousness: awake    Nausea/Vomiting: no nausea/vomiting    Complications: none    Transfer of care protocol was followed      Last vitals:   Visit Vitals  /83 (BP Location: Right arm, Patient Position: Lying)   Pulse 60   Temp 37.1 °C (98.8 °F) (Oral)   Resp 18   Ht 5' 11" (1.803 m)   Wt 82.6 kg (182 lb)   SpO2 99%   BMI 25.38 kg/m²     "

## 2022-08-17 NOTE — BRIEF OP NOTE
[facility] - Surgery (2nd Fl)  Brief Operative Note    SUMMARY     Surgery Date: 8/17/2022     Surgeon(s) and Role:     * ROSANNA eSrvin III, MD - Primary     * Raudel Rivera MD - Resident - Assisting     * Ken Garcia MD - Fellow    Pre-op Diagnosis:  99% Left carotid artery stenosis [I65.22], string sign, with prior stroke    Post-op Diagnosis:   same    Procedure(s) (LRB):  ENDARTERECTOMY-CAROTID (Left) with bovine patch    Anesthesia: General    Operative Findings: Distal ICA smallish but patent, good backbleeding;   Severe occlusive plaque; Performed without a shunt, BP increased during clamp    Estimated Blood Loss: 60cc          Specimens:   Specimen (24h ago, onward)            None          [id number]

## 2022-08-17 NOTE — ANESTHESIA PROCEDURE NOTES
Arterial    Diagnosis: carotid stenosis    Patient location during procedure: done in OR  Procedure start time: 8/17/2022 11:02 AM  Timeout: 8/17/2022 11:02 AM  Procedure end time: 8/17/2022 11:05 AM    Staffing  Authorizing Provider: Carlos Mccloud MD  Performing Provider: Franco Rene MD    Anesthesiologist was present at the time of the procedure.    Preanesthetic Checklist  Completed: patient identified, IV checked, site marked, risks and benefits discussed, surgical consent, monitors and equipment checked, pre-op evaluation, timeout performed and anesthesia consent givenArterial  Skin Prep: chlorhexidine gluconate  Orientation: left  Location: radial    Catheter Size: 20 G  Catheter placement by Anatomical landmarks. Heme positive aspiration all ports. Insertion Attempts: 1  Assessment  Dressing: secured with tape and tegaderm  Patient: Tolerated well

## 2022-08-17 NOTE — NURSING
Pt arrived to SICU via bed w/ anesthesia and Dr. Garcia. Charge RN notified and at bedside, Dr. Alfaro notified and at bedside. Pt connected to bedside telemetry monitor, VSS, no gtts on 6LPM facemask. Orders received for labs, SBP <140, and q2 neurovascular checks. Pt able to follow commands and move all extremities. R side noted to have weakness due to a past stroke.    Skin: no skin breakdown noted, all dressings changed. Skin remains CDI. Incision noted to L neck, dressed in gauze and tegaderm, no drainage present. Waffle mattress inflated, foams applied, SCDs on.

## 2022-08-18 ENCOUNTER — DOCUMENTATION ONLY (OUTPATIENT)
Dept: CARDIOLOGY | Facility: HOSPITAL | Age: 57
End: 2022-08-18
Payer: OTHER GOVERNMENT

## 2022-08-18 VITALS
WEIGHT: 182 LBS | HEART RATE: 67 BPM | SYSTOLIC BLOOD PRESSURE: 93 MMHG | OXYGEN SATURATION: 99 % | RESPIRATION RATE: 24 BRPM | DIASTOLIC BLOOD PRESSURE: 53 MMHG | HEIGHT: 71 IN | BODY MASS INDEX: 25.48 KG/M2 | TEMPERATURE: 99 F

## 2022-08-18 LAB
ALBUMIN SERPL BCP-MCNC: 3.2 G/DL (ref 3.5–5.2)
ALP SERPL-CCNC: 55 U/L (ref 55–135)
ALT SERPL W/O P-5'-P-CCNC: 20 U/L (ref 10–44)
ANION GAP SERPL CALC-SCNC: 11 MMOL/L (ref 8–16)
AST SERPL-CCNC: 18 U/L (ref 10–40)
BASOPHILS # BLD AUTO: 0.02 K/UL (ref 0–0.2)
BASOPHILS NFR BLD: 0.1 % (ref 0–1.9)
BILIRUB SERPL-MCNC: 0.5 MG/DL (ref 0.1–1)
BUN SERPL-MCNC: 16 MG/DL (ref 6–20)
CALCIUM SERPL-MCNC: 8.5 MG/DL (ref 8.7–10.5)
CHLORIDE SERPL-SCNC: 108 MMOL/L (ref 95–110)
CO2 SERPL-SCNC: 20 MMOL/L (ref 23–29)
CREAT SERPL-MCNC: 0.8 MG/DL (ref 0.5–1.4)
DIFFERENTIAL METHOD: ABNORMAL
EOSINOPHIL # BLD AUTO: 0 K/UL (ref 0–0.5)
EOSINOPHIL NFR BLD: 0.1 % (ref 0–8)
ERYTHROCYTE [DISTWIDTH] IN BLOOD BY AUTOMATED COUNT: 12.9 % (ref 11.5–14.5)
EST. GFR  (NO RACE VARIABLE): >60 ML/MIN/1.73 M^2
GLUCOSE SERPL-MCNC: 119 MG/DL (ref 70–110)
GLUCOSE SERPL-MCNC: 96 MG/DL (ref 70–110)
HCO3 UR-SCNC: 26.1 MMOL/L (ref 24–28)
HCT VFR BLD AUTO: 36.8 % (ref 40–54)
HCT VFR BLD CALC: 41 %PCV (ref 36–54)
HGB BLD-MCNC: 12.4 G/DL (ref 14–18)
IMM GRANULOCYTES # BLD AUTO: 0.04 K/UL (ref 0–0.04)
IMM GRANULOCYTES NFR BLD AUTO: 0.3 % (ref 0–0.5)
LYMPHOCYTES # BLD AUTO: 1.4 K/UL (ref 1–4.8)
LYMPHOCYTES NFR BLD: 9.2 % (ref 18–48)
MAGNESIUM SERPL-MCNC: 1.7 MG/DL (ref 1.6–2.6)
MCH RBC QN AUTO: 30 PG (ref 27–31)
MCHC RBC AUTO-ENTMCNC: 33.7 G/DL (ref 32–36)
MCV RBC AUTO: 89 FL (ref 82–98)
MONOCYTES # BLD AUTO: 0.8 K/UL (ref 0.3–1)
MONOCYTES NFR BLD: 5.5 % (ref 4–15)
NEUTROPHILS # BLD AUTO: 12.7 K/UL (ref 1.8–7.7)
NEUTROPHILS NFR BLD: 84.8 % (ref 38–73)
NRBC BLD-RTO: 0 /100 WBC
PCO2 BLDA: 42.3 MMHG (ref 35–45)
PH SMN: 7.4 [PH] (ref 7.35–7.45)
PHOSPHATE SERPL-MCNC: 3.4 MG/DL (ref 2.7–4.5)
PLATELET # BLD AUTO: 208 K/UL (ref 150–450)
PMV BLD AUTO: 10.8 FL (ref 9.2–12.9)
PO2 BLDA: 252 MMHG (ref 80–100)
POC ACTIVATED CLOTTING TIME K: 126 SEC (ref 74–137)
POC BE: 1 MMOL/L
POC IONIZED CALCIUM: 1.14 MMOL/L (ref 1.06–1.42)
POC SATURATED O2: 100 % (ref 95–100)
POC TCO2: 27 MMOL/L (ref 23–27)
POTASSIUM BLD-SCNC: 4.2 MMOL/L (ref 3.5–5.1)
POTASSIUM SERPL-SCNC: 4.5 MMOL/L (ref 3.5–5.1)
PROT SERPL-MCNC: 5.9 G/DL (ref 6–8.4)
RBC # BLD AUTO: 4.13 M/UL (ref 4.6–6.2)
SAMPLE: ABNORMAL
SAMPLE: NORMAL
SODIUM BLD-SCNC: 139 MMOL/L (ref 136–145)
SODIUM SERPL-SCNC: 139 MMOL/L (ref 136–145)
WBC # BLD AUTO: 15.02 K/UL (ref 3.9–12.7)

## 2022-08-18 PROCEDURE — 80053 COMPREHEN METABOLIC PANEL: CPT | Performed by: STUDENT IN AN ORGANIZED HEALTH CARE EDUCATION/TRAINING PROGRAM

## 2022-08-18 PROCEDURE — 83735 ASSAY OF MAGNESIUM: CPT | Performed by: STUDENT IN AN ORGANIZED HEALTH CARE EDUCATION/TRAINING PROGRAM

## 2022-08-18 PROCEDURE — 25000003 PHARM REV CODE 250: Performed by: STUDENT IN AN ORGANIZED HEALTH CARE EDUCATION/TRAINING PROGRAM

## 2022-08-18 PROCEDURE — 94761 N-INVAS EAR/PLS OXIMETRY MLT: CPT

## 2022-08-18 PROCEDURE — 94799 UNLISTED PULMONARY SVC/PX: CPT

## 2022-08-18 PROCEDURE — 85025 COMPLETE CBC W/AUTO DIFF WBC: CPT | Performed by: STUDENT IN AN ORGANIZED HEALTH CARE EDUCATION/TRAINING PROGRAM

## 2022-08-18 PROCEDURE — 84100 ASSAY OF PHOSPHORUS: CPT | Performed by: STUDENT IN AN ORGANIZED HEALTH CARE EDUCATION/TRAINING PROGRAM

## 2022-08-18 RX ORDER — LANOLIN ALCOHOL/MO/W.PET/CERES
400 CREAM (GRAM) TOPICAL ONCE
Status: COMPLETED | OUTPATIENT
Start: 2022-08-18 | End: 2022-08-18

## 2022-08-18 RX ADMIN — PRAVASTATIN SODIUM 80 MG: 20 TABLET ORAL at 08:08

## 2022-08-18 RX ADMIN — ACETAMINOPHEN 650 MG: 325 TABLET ORAL at 03:08

## 2022-08-18 RX ADMIN — ASPIRIN 81 MG: 81 TABLET, COATED ORAL at 08:08

## 2022-08-18 RX ADMIN — Medication 400 MG: at 08:08

## 2022-08-18 RX ADMIN — MUPIROCIN: 20 OINTMENT TOPICAL at 08:08

## 2022-08-18 NOTE — PLAN OF CARE
CM met with patient to discuss discharge planning patient lives alone in a 1 story house he is not on dialysis and does not take coumadin he has transportation home   [facility]  [facility]  Initial Discharge Assessment       Primary Care Provider: Stefan Woods MD    Admission Diagnosis: Left carotid artery stenosis [I65.22]  Carotid artery stenosis [I65.29]    Admission Date: 8/17/2022  Expected Discharge Date: 8/18/2022    Discharge Barriers Identified: None    Payor: [employer] / Plan: [facility] [employer] / Product Type: Government /     Extended Emergency Contact Information  Primary Emergency Contact: Gilmer Iqbal  Mobile Phone: [phone]  Relation: Other    Discharge Plan A: Home, Home with family         [facility] - [facility] [address]  [address] [address]  Phone: [phone] Fax: [phone]      Initial Assessment (most recent)     Adult Discharge Assessment - 08/18/22 1227        Discharge Assessment    Assessment Type Discharge Planning Brief Assessment     Confirmed/corrected address, phone number and insurance Yes     Confirmed Demographics Correct on Facesheet     Source of Information patient     Lives With alone     Prior to hospitilization cognitive status: No Deficits     Current cognitive status: No Deficits     Walking or Climbing Stairs Difficulty none     Dressing/Bathing Difficulty none     Home Layout Able to live on 1st floor     Equipment Currently Used at Home none     Readmission within 30 days? No     Patient currently being followed by outpatient case management? No     Do you currently have service(s) that help you manage your care at home? No     Do you take prescription medications? Yes     Do you have prescription coverage? Yes     Do you have any problems affording any of your prescribed medications? No     Is the patient taking medications as  prescribed? yes     Who is going to help you get home at discharge? cj graf     How do you get to doctors appointments? family or friend will provide;car, drives self     Are you on dialysis? No     Do you take coumadin? No     Discharge Plan A Home;Home with family     DME Needed Upon Discharge  none     Discharge Plan discussed with: Patient     Discharge Barriers Identified None

## 2022-08-18 NOTE — DISCHARGE INSTRUCTIONS
VASCULAR SURGERY DISCHARGE INSTRUCTIONS    Woundcare:  - Take your dressing off 2 days after your surgery and take a shower. Shower daily and pad your incision site dry  - Leave sutures in place  - It is normal to notice some bruising at your incision site in the first 1-3 days after surgery    Activity:  - Activity is good for you. Try to walk frequently and increase walking distance every day  - No heavy lifting for 2 weeks  - No baths, swimming in pools, lakes, [place] etc. for 2 weeks     Diet:  -Resume your pre-operative home diet    Follow up:  -Follow up for ultrasound and vascular surgery clinic appointment in ~4 weeks    Call Vascular Surgery Office at [phone] if you experience:  -Increased redness, warmth, tenderness, or draining pus from your incision  -Increased pain/swelling at your incision  -Worsening fevers, chills, nausea/vomiting  -Pain, weakness, coldness, or numbness in your legs  -Uncontrolled pain  -Your call will be returned within 24 hours and further instructions will be provided    Go to ER/Urgent Care if you experience:  -Worsening shortness of breath or chest pain  -Sudden severe pain and swelling at your incision site

## 2022-08-18 NOTE — PROGRESS NOTES
[facility]  Critical Care - Surgery  Progress Note    Patient Name: Adarsh Wheeler  MRN: [id number]  Admission Date: 2022  Hospital Length of Stay: 1 days  Code Status: Full Code  Attending Provider: ROSANNA Servin III, MD  Primary Care Provider: Stefan Woods MD   Principal Problem: Bilateral carotid artery stenosis    Subjective:     Hospital/ICU Course:  No notes on file    Follow-up For: Procedure(s) (LRB):  ENDARTERECTOMY-CAROTID (Left)    Post-Operative Day: 1 Day Post-Op    HPI: NAEO, received 1L bolus and patient has been HDS.  Fischer placed last night, removed this morning and patient voided sucessfully      History reviewed. No pertinent past medical history.    Past Surgical History:   Procedure Laterality Date    EYE SURGERY      FOOT SURGERY      HAND SURGERY         Review of patient's allergies indicates:   Allergen Reactions    Escitalopram Nausea And Vomiting    Mirtazapine Rash       Family History       Problem Relation (Age of Onset)    COPD Father    Cancer Sister    Stroke Maternal Uncle          Tobacco Use    Smoking status: Former Smoker     Types: Pipe     Quit date: 2014     Years since quittin.6    Smokeless tobacco: Never Used   Substance and Sexual Activity    Alcohol use: Not Currently    Drug use: Never    Sexual activity: Not Currently     Birth control/protection: None      Review of Systems   Constitutional:  Negative for chills and fever.   HENT:  Negative for sore throat.    Respiratory:  Negative for shortness of breath.    Gastrointestinal:  Negative for nausea and vomiting.   Genitourinary:  Negative for difficulty urinating.   Musculoskeletal:  Negative for myalgias.   Neurological:  Negative for weakness.   Psychiatric/Behavioral:  Negative for agitation.    All other systems reviewed and are negative.  Objective:     Vital Signs (Most Recent):  Temp: 98.7 °F (37.1 °C) (22 2300)  Pulse: 61 (22  0100)  Resp: 16 (08/18/22 0100)  BP: (!) 90/55 (08/18/22 0100)  SpO2: 98 % (08/18/22 0100)   Vital Signs (24h Range):  Temp:  [97.9 °F (36.6 °C)-98.8 °F (37.1 °C)] 98.7 °F (37.1 °C)  Pulse:  [56-76] 61  Resp:  [10-24] 16  SpO2:  [88 %-100 %] 98 %  BP: ()/(50-83) 90/55  Arterial Line BP: ()/(41-55) 109/51     Weight: 82.6 kg (182 lb)  Body mass index is 25.38 kg/m².      Intake/Output Summary (Last 24 hours) at 8/18/2022 0126  Last data filed at 8/18/2022 0100  Gross per 24 hour   Intake 1500 ml   Output 895 ml   Net 605 ml       Physical Exam  Constitutional:       General: He is not in acute distress.     Appearance: He is not ill-appearing.   HENT:      Head: Normocephalic.      Mouth/Throat:      Mouth: Mucous membranes are moist.      Pharynx: Oropharynx is clear.   Eyes:      Extraocular Movements: Extraocular movements intact.      Conjunctiva/sclera: Conjunctivae normal.   Neck:      Comments: L neck incision site c/d/i  Not TTP  Cardiovascular:      Rate and Rhythm: Normal rate and regular rhythm.      Pulses: Normal pulses.   Pulmonary:      Effort: Pulmonary effort is normal. No respiratory distress.   Abdominal:      General: Abdomen is flat. There is no distension.      Palpations: Abdomen is soft.      Tenderness: There is no abdominal tenderness.   Musculoskeletal:      Cervical back: Neck supple.      Right lower leg: No edema.      Left lower leg: No edema.   Skin:     General: Skin is warm and dry.   Neurological:      Mental Status: He is alert.      Comments: Mild R>L UE weakness       Lines/Drains/Airways       Drain  Duration                  Urethral Catheter 08/17/22 2250 <1 day              Arterial Line  Duration             Arterial Line 08/17/22 1102 Left Radial <1 day              Peripheral Intravenous Line  Duration                  Peripheral IV - Single Lumen 08/17/22 18 G Left Antecubital 1 day         Peripheral IV - Single Lumen 08/17/22 0920 20 G Posterior;Right  Forearm <1 day                    Significant Labs:    CBC/Anemia Profile:  Recent Labs   Lab 08/17/22  1411   WBC 14.54*   HGB 13.3*   HCT 41.3      MCV 93   RDW 12.8        Chemistries:  Recent Labs   Lab 08/17/22  1411      K 4.1      CO2 23   BUN 12   CREATININE 0.8   CALCIUM 8.2*   ALBUMIN 3.6   PROT 6.2   BILITOT 0.6   ALKPHOS 67   ALT 19   AST 15   MG 1.8   PHOS 2.7       All pertinent labs within the past 24 hours have been reviewed.    Significant Imaging: I have reviewed all pertinent imaging results/findings within the past 24 hours.    Assessment/Plan:     * Bilateral carotid artery stenosis      Neuro/Psych:   -- Sedation: none  -- Pain: PRN tylenol, oxy, and dilaudid             Cards:   -- continue aspirin 81mg  -- continue pravastatin 80mg  -- q1hr neurovascular checks post-op      Pulm:   -- goal O2 sat  >90%  -- wean supplemental oxygen as tolerated      Renal:  -- CMP daily  -- BUN/Cr normal      FEN / GI:   -- Replace lytes as needed  -- cardiac diet      ID:   -- ancef intra-op  -- no s/s infection      Heme/Onc:   --Hgb stable  --Daily CBC      Endo:   -- Gluc goal 140-180      PPx:   Feeding: cardiac diet  Analgesia/Sedation: PRN anagelsia  Thromboembolic prevention: discharging home  HOB >30: yes  Stress Ulcer ppx: none  Glucose control: Critical care goal 140-180 g/dl     Lines/Drains/Airway: d/c PIV, art line, gu      Dispo/Code Status/Palliative:   -- Discharge to Home by primary team / Full Code         Critical care was time spent personally by me on the following activities: development of treatment plan with patient or surrogate and bedside caregivers, discussions with consultants, evaluation of patient's response to treatment, examination of patient, ordering and performing treatments and interventions, ordering and review of laboratory studies, ordering and review of radiographic studies, pulse oximetry, re-evaluation of patient's condition.  This  critical care time did not overlap with that of any other provider or involve time for any procedures.     Jose Reynolds, DO  Critical Care - Surgery  [facility]

## 2022-08-18 NOTE — PLAN OF CARE
Patient discharged home no needs  [facility]  Discharge Final Note    Primary Care Provider: Stefan Woods MD    Expected Discharge Date: 8/18/2022    Final Discharge Note (most recent)     Final Note - 08/18/22 1232        Final Note    Assessment Type Final Discharge Note     Anticipated Discharge Disposition Home or Self Care        Post-Acute Status    Discharge Delays None known at this time                 Important Message from Medicare             Contact Info     ROSANNA Servin Iii, MD   Specialty: Vascular Surgery    [address]  [facility] [address]   Phone: [phone]       Next Steps: Follow up in 4 week(s)    Instructions: with duplex ultrasound of carotid

## 2022-08-18 NOTE — DISCHARGE SUMMARY
[facility]  Vascular Surgery  Discharge Summary      Patient Name: Adarsh Wheeler  MRN: [id number]  Admission Date: 8/17/2022  Hospital Length of Stay: 1 days  Discharge Date and Time:  08/18/2022 9:41 AM  Attending Physician: ROSANNA Servin III, MD   Discharging Provider: Raudel Rivera MD  Primary Care Provider: Stefan Woods MD    HPI:   Adarsh Wheeler is a 56 y.o. male [facility] who suffered a major left hemispheric stroke in December 2021. Per the sister sister SOFÍA he was found down after no contact for 4 days.  He initially was aphasic with severe motor deficits of his right arm and right leg.  He ultimately spent 8 months in rehab center and was discharged aproximately 2 months ago.  He is now POD1 s/p uncomplicated L endarterectomy for asymptomatic (since his L hemisphere stroke) carotid stenosis.    Procedure(s) (LRB):  ENDARTERECTOMY-CAROTID (Left)     Hospital Course:  Adarsh Wheeler presented to [facility] on the morning of 8/17/2022 for carotid endarterectomy. The procedure was performed without complication and he was transferred to the floor for further post op care and monitoring. Their postoperative course was uneventful and progressed according to plan.  His pain was controlled with a combination of IV and PO narcotic pain medications. His diet has been advanced throughout his hospital stay. He is now ambulating without assistance, tolerating a regular diet, pain is well controlled with PO pain medication, and is voiding appropriately. Patient now meets all criteria for discharge.  Goals of Care Treatment Preferences:  Code Status: Full Code          Pending Diagnostic Studies:     None        Final Active Diagnoses:    Diagnosis Date Noted POA    PRINCIPAL PROBLEM:  Bilateral carotid artery stenosis [I65.23]  Yes    Apraxia of speech [R48.2] 07/18/2022 Yes    Left-sided cerebrovascular accident (CVA) [I63.9] 07/11/2022 Yes    Right sided weakness [R53.1]  07/11/2022 Yes    Mixed hyperlipidemia [E78.2] 07/11/2022 Yes      Problems Resolved During this Admission:      Discharged Condition: good    Disposition: Home or Self Care    Follow Up:   Follow-up Information     ROSANNA Servin Iii, MD Follow up in 4 week(s).    Specialty: Vascular Surgery  Why: with duplex ultrasound of carotid  Contact information:  [address]  [facility] [address]  [phone]                         Patient Instructions:   VASCULAR SURGERY DISCHARGE INSTRUCTIONS    Woundcare:  - Take your dressing off 2 days after your surgery and take a shower. Shower daily and pad your incision site dry  - Leave sutures in place  - It is normal to notice some bruising at your incision site in the first 1-3 days after surgery    Activity:  - Activity is good for you. Try to walk frequently and increase walking distance every day  - No heavy lifting for 2 weeks  - No baths, swimming in pools, lakes, jacuzzi etc. for 2 weeks     Diet:  -Resume your pre-operative home diet    Follow up:  -Follow up for ultrasound and vascular surgery clinic appointment in ~2 weeks    Call Vascular Surgery Office at [phone] if you experience:  -Increased redness, warmth, tenderness, or draining pus from your incision  -Increased pain/swelling at your incision  -Worsening fevers, chills, nausea/vomiting  -Pain, weakness, coldness, or numbness in your legs  -Uncontrolled pain  -Your call will be returned within 24 hours and further instructions will be provided    Go to ER/Urgent Care if you experience:  -Worsening shortness of breath or chest pain  -Sudden severe pain and swelling at your incision site       Medications:  Reconciled Home Medications:      Medication List      CHANGE how you take these medications    clopidogreL 75 mg tablet  Commonly known as: PLAVIX  Take 1 tablet (75 mg total) by mouth once. for 1 dose  What changed: when to take this     DULoxetine 30 MG capsule  Commonly known as:  CYMBALTA  Take 1 capsule (30 mg total) by mouth once daily.  What changed:   · when to take this  · reasons to take this     lisinopriL 2.5 MG tablet  Commonly known as: PRINIVIL,ZESTRIL  Take 1 tablet (2.5 mg total) by mouth once daily.  What changed: when to take this     rosuvastatin 40 MG Tab  Commonly known as: CRESTOR  Take 1 tablet (40 mg total) by mouth once daily.  What changed: when to take this        CONTINUE taking these medications    acetaminophen 325 MG tablet  Commonly known as: TYLENOL  Take 650 mg by mouth.     ASPIR-81 ORAL  Take by mouth every morning.     lactulose 10 gram/15 mL solution  Commonly known as: CHRONULAC  Take 20 g by mouth.     MELATONIN ORAL  Take by mouth every evening.     ondansetron 4 MG tablet  Commonly known as: ZOFRAN  Take 1 tablet by mouth every 6 (six) hours as needed.     polyethylene glycol 17 gram/dose powder  Commonly known as: GLYCOLAX  Take by mouth.     sertraline 25 MG tablet  Commonly known as: ZOLOFT  Take 25 mg by mouth once daily.     traZODone 100 MG tablet  Commonly known as: DESYREL  Take 0.5 tablets by mouth nightly as needed.        STOP taking these medications    quetiapine 12.5 MG tablet  Commonly known as: MADELINE Rivera MD  Vascular Surgery  [facility] - Surgical Intensive Care

## 2022-08-18 NOTE — NURSING
"      SICU PLAN OF CARE NOTE    Dx: Bilateral carotid artery stenosis    Shift Events: Pt reported burning while attempting to urinate, in and out cath around 2100 for retention. UA sent. Fischer subsequently placed at 2300. Retroperitoneal US performed.     Goals of Care: MAP > 65, SBP < 140    Neuro: AAO x4, Follows Commands and Moves All Extremities    Vital Signs: BP (!) 104/55   Pulse (!) 55   Temp 98.7 °F (37.1 °C) (Oral)   Resp 18   Ht 5' 11" (1.803 m)   Wt 82.6 kg (182 lb)   SpO2 98%   BMI 25.38 kg/m²     Respiratory: Room Air    Diet: Cardiac Diet    Urine Output: Urinary Catheter 1860 cc/shift     Labs/Accuchecks: daily labs     Skin: No signs of breakdown noted. Pt out of bed in chair. Heel and sacral foams in place.        "

## 2022-08-18 NOTE — SUBJECTIVE & OBJECTIVE
Follow-up For: Procedure(s) (LRB):  ENDARTERECTOMY-CAROTID (Left)    Post-Operative Day: 1 Day Post-Op    HPI: NAEO, received 1L bolus and patient has been HDS.  Fischer placed last night, removed this morning and patient voided sucessfully      History reviewed. No pertinent past medical history.    Past Surgical History:   Procedure Laterality Date    EYE SURGERY      FOOT SURGERY      HAND SURGERY         Review of patient's allergies indicates:   Allergen Reactions    Escitalopram Nausea And Vomiting    Mirtazapine Rash       Family History       Problem Relation (Age of Onset)    COPD Father    Cancer Sister    Stroke Maternal Uncle          Tobacco Use    Smoking status: Former Smoker     Types: Pipe     Quit date: 2014     Years since quittin.6    Smokeless tobacco: Never Used   Substance and Sexual Activity    Alcohol use: Not Currently    Drug use: Never    Sexual activity: Not Currently     Birth control/protection: None      Review of Systems   Constitutional:  Negative for chills and fever.   HENT:  Negative for sore throat.    Respiratory:  Negative for shortness of breath.    Gastrointestinal:  Negative for nausea and vomiting.   Genitourinary:  Negative for difficulty urinating.   Musculoskeletal:  Negative for myalgias.   Neurological:  Negative for weakness.   Psychiatric/Behavioral:  Negative for agitation.    All other systems reviewed and are negative.  Objective:     Vital Signs (Most Recent):  Temp: 98.7 °F (37.1 °C) (22 2300)  Pulse: 61 (22 0100)  Resp: 16 (22 0100)  BP: (!) 90/55 (22 0100)  SpO2: 98 % (22 0100)   Vital Signs (24h Range):  Temp:  [97.9 °F (36.6 °C)-98.8 °F (37.1 °C)] 98.7 °F (37.1 °C)  Pulse:  [56-76] 61  Resp:  [10-24] 16  SpO2:  [88 %-100 %] 98 %  BP: ()/(50-83) 90/55  Arterial Line BP: ()/(41-55) 109/51     Weight: 82.6 kg (182 lb)  Body mass index is 25.38 kg/m².      Intake/Output Summary (Last 24 hours) at 2022  0126  Last data filed at 8/18/2022 0100  Gross per 24 hour   Intake 1500 ml   Output 895 ml   Net 605 ml       Physical Exam  Constitutional:       General: He is not in acute distress.     Appearance: He is not ill-appearing.   HENT:      Head: Normocephalic.      Mouth/Throat:      Mouth: Mucous membranes are moist.      Pharynx: Oropharynx is clear.   Eyes:      Extraocular Movements: Extraocular movements intact.      Conjunctiva/sclera: Conjunctivae normal.   Neck:      Comments: L neck incision site c/d/i  Not TTP  Cardiovascular:      Rate and Rhythm: Normal rate and regular rhythm.      Pulses: Normal pulses.   Pulmonary:      Effort: Pulmonary effort is normal. No respiratory distress.   Abdominal:      General: Abdomen is flat. There is no distension.      Palpations: Abdomen is soft.      Tenderness: There is no abdominal tenderness.   Musculoskeletal:      Cervical back: Neck supple.      Right lower leg: No edema.      Left lower leg: No edema.   Skin:     General: Skin is warm and dry.   Neurological:      Mental Status: He is alert.      Comments: Mild R>L UE weakness       Lines/Drains/Airways       Drain  Duration                  Urethral Catheter 08/17/22 2250 <1 day              Arterial Line  Duration             Arterial Line 08/17/22 1102 Left Radial <1 day              Peripheral Intravenous Line  Duration                  Peripheral IV - Single Lumen 08/17/22 18 G Left Antecubital 1 day         Peripheral IV - Single Lumen 08/17/22 0920 20 G Posterior;Right Forearm <1 day                    Significant Labs:    CBC/Anemia Profile:  Recent Labs   Lab 08/17/22  1411   WBC 14.54*   HGB 13.3*   HCT 41.3      MCV 93   RDW 12.8        Chemistries:  Recent Labs   Lab 08/17/22  1411      K 4.1      CO2 23   BUN 12   CREATININE 0.8   CALCIUM 8.2*   ALBUMIN 3.6   PROT 6.2   BILITOT 0.6   ALKPHOS 67   ALT 19   AST 15   MG 1.8   PHOS 2.7       All pertinent labs within the past 24  hours have been reviewed.    Significant Imaging: I have reviewed all pertinent imaging results/findings within the past 24 hours.

## 2022-08-18 NOTE — ASSESSMENT & PLAN NOTE
  Neuro/Psych:   -- Sedation: none  -- Pain: PRN tylenol, oxy, and dilaudid             Cards:   -- continue aspirin 81mg  -- continue pravastatin 80mg  -- q1hr neurovascular checks post-op      Pulm:   -- goal O2 sat  >90%  -- wean supplemental oxygen as tolerated      Renal:  -- CMP daily  -- BUN/Cr normal      FEN / GI:   -- Replace lytes as needed  -- cardiac diet      ID:   -- ancef intra-op  -- no s/s infection      Heme/Onc:   --Hgb stable  --Daily CBC      Endo:   -- Gluc goal 140-180      PPx:   Feeding: cardiac diet  Analgesia/Sedation: PRN anagelsia  Thromboembolic prevention: discharging home  HOB >30: yes  Stress Ulcer ppx: none  Glucose control: Critical care goal 140-180 g/dl     Lines/Drains/Airway: d/c PIV, art line, gu      Dispo/Code Status/Palliative:   -- Discharge to Home by primary team / Full Code

## 2022-08-18 NOTE — PROGRESS NOTES
Per [employer], patient received 30 day event monitor by mail; however, did not proceed with testing.

## 2022-08-18 NOTE — ANESTHESIA POSTPROCEDURE EVALUATION
Anesthesia Post Evaluation    Patient: Adarsh Wheeler    Procedure(s) Performed: Procedure(s) (LRB):  ENDARTERECTOMY-CAROTID (Left)    Final Anesthesia Type: general      Patient location during evaluation: ICU  Patient participation: Yes- Able to Participate  Level of consciousness: awake and alert  Post-procedure vital signs: reviewed and stable  Pain management: adequate  Airway patency: patent    PONV status at discharge: No PONV  Anesthetic complications: no      Cardiovascular status: blood pressure returned to baseline  Respiratory status: unassisted and nasal cannula  Hydration status: euvolemic  Follow-up not needed.          Vitals Value Taken Time   BP 99/57 08/18/22 0707   Temp 37.1 °C (98.7 °F) 08/18/22 0300   Pulse 61 08/18/22 0708   Resp 17 08/18/22 0708   SpO2 98 % 08/18/22 0708   Vitals shown include unvalidated device data.      No case tracking events are documented in the log.      Pain/Kamala Score: Pain Rating Prior to Med Admin: 3 (8/18/2022  3:19 AM)

## 2022-08-20 NOTE — OP NOTE
Surgery Date: 8/17/2022      Surgeon(s) and Role:     * ROSANNA Servin III, MD - Primary     * Raudel Rivera MD - Resident - Assisting     * Ken Garcia MD - Fellow     Pre-op Diagnosis:  99% Left carotid artery stenosis [I65.22], string sign, with prior stroke     Post-op Diagnosis:   same     Procedure(s) (LRB):  ENDARTERECTOMY-CAROTID (Left) with bovine patch     Anesthesia: General     Operative Findings: Distal ICA smallish but patent, good backbleeding;   Severe occlusive plaque; Performed without a shunt, BP increased during clamp     Estimated Blood Loss: 60cc           Specimens:       Specimen (24h ago, onward)                 None             Description of Technical Procedures:   After informed consent was obtained patient was brought to the operating room laid supine on the operating table.  General anesthesia was induced and he was intubated.  Arterial line was placed by Anesthesia.  Patient was placed with the head tilted to the left in modified beach chair position.  Neck was prepped and draped standard fashion.   Time-out was performed prior to beginning procedure.  A longitudinal incision was made over the anterior border of the sternocleidomastoid muscle.  Soft tissue and platysma muscle were divided with electrocautery.  The anterior border of the SCM was identified retracted laterally as attachments were cleared.  Retractors were placed.  The internal jugular vein was identified and cleared anteriorly.  A small crossing proximal vein as well as facial vein were identified and ligated doubly between silks with clips.  The vein was retracted laterally.  The vagus nerve was identified and protected.  The common carotid artery was identified cleared circumferentially.  An umbilical tape was placed around this and a Rumel tourniquet was applied but not tightened.  We then traced the carotid artery more distally, identified the external carotid artery.  This was cleared circumferentially  controlled with a Toscano vessel loop.  Likewise the superior thyroid artery was identified controlled with a vessel loop.  We then cleared the anterior portion of the internal carotid artery for several cm.  Once we had found healthy portion of internal carotid artery it was circumferentially dissected and a Toscano vessel loop was placed around this as well.   The patient was then systemically heparinized with 8000U of IV heparin.  Once 3 minutes had elapsed the Toscano vessel loop on the internal carotid artery was brought under tension to clamp it.  Likewise the common carotid artery and external carotid artery were clamped. An eleven blade was used to make a longitudinal incision on the common carotid artery in the soft spot this was carried forward onto the internal carotid artery with Toscano scissors.  We then began performing our endarterectomy.  A nice feathered distal edge was obtained utilizing scoring of the plaque with toscano scissors.  We then worked more proximally in the common carotid artery and transected the plaque with Toscano scissors.  The entire artery was endarterectomized up to the external carotid artery and we performed eversion endarterectomy on the ECA.  Once the plaque was removed we began clearing the artery of any remaining plaque with heparinized saline and eye thalia.  Once were satisfied with this the entire artery was flushed.  We then brought a 8 mm bovine pericardial patch onto the field.  We began sewing this in with 6-0 Prolene suture at the distal endpoint.   The patch was then started from the proximal endpoint and another 6-0 Prolene suture was used to complete the anastomosis.  The  artery was flushed copiously.  All vessels were back bled and the artery was again flushed thoroughly with heparinized saline prior to completing the patch closure.  Clamps were removed allowing the internal artery to backbleed 1st and then clamping the internal and allowing all debris to flush up the  external carotid artery.  There was excellent continuous Doppler wave flow in the internal carotid artery at the end of the case.  Protamine was used to reverse heparinization.  After hemostasis was achieved in the wound. Platysma was closed over this with 3-0 vicryl.  Skin was closed with 4-0 monocryl in a running subcuticular fashion.  The incision was dressed with Steri-Strips and mastasol and telfa.  The patient woke up neurologically intact moving all extremities and following commands. Patient tolerated procedure very well was taken postanesthesia recovery unit in good condition.  All needle, sponge, instrument counts were correct at the end the case. There were no complications.       Ken Garcia MD PGY7  Vascular Surgery Fellow  [phone]

## 2022-08-24 ENCOUNTER — PATIENT MESSAGE (OUTPATIENT)
Dept: VASCULAR SURGERY | Facility: CLINIC | Age: 57
End: 2022-08-24
Payer: OTHER GOVERNMENT

## 2022-08-25 ENCOUNTER — HOSPITAL ENCOUNTER (OUTPATIENT)
Facility: HOSPITAL | Age: 57
Discharge: HOME | End: 2022-08-27
Attending: EMERGENCY MEDICINE | Admitting: EMERGENCY MEDICINE
Payer: OTHER GOVERNMENT

## 2022-08-25 ENCOUNTER — ANESTHESIA (OUTPATIENT)
Dept: SURGERY | Facility: HOSPITAL | Age: 57
End: 2022-08-25
Payer: OTHER GOVERNMENT

## 2022-08-25 ENCOUNTER — ANESTHESIA EVENT (OUTPATIENT)
Dept: SURGERY | Facility: HOSPITAL | Age: 57
End: 2022-08-25
Payer: OTHER GOVERNMENT

## 2022-08-25 DIAGNOSIS — S10.93XA HEMATOMA OF NECK: Primary | ICD-10-CM

## 2022-08-25 PROCEDURE — D9220A PRA ANESTHESIA: Mod: CRNA,,, | Performed by: NURSE ANESTHETIST, CERTIFIED REGISTERED

## 2022-08-25 PROCEDURE — D9220A PRA ANESTHESIA: ICD-10-PCS | Mod: ANES,,, | Performed by: ANESTHESIOLOGY

## 2022-08-25 PROCEDURE — 63600175 PHARM REV CODE 636 W HCPCS: Performed by: SURGERY

## 2022-08-25 PROCEDURE — 63600175 PHARM REV CODE 636 W HCPCS: Performed by: NURSE ANESTHETIST, CERTIFIED REGISTERED

## 2022-08-25 PROCEDURE — 25000003 PHARM REV CODE 250: Performed by: NURSE ANESTHETIST, CERTIFIED REGISTERED

## 2022-08-25 PROCEDURE — 71000033 HC RECOVERY, INTIAL HOUR: Performed by: SURGERY

## 2022-08-25 PROCEDURE — 71000016 HC POSTOP RECOV ADDL HR: Performed by: SURGERY

## 2022-08-25 PROCEDURE — 99284 EMERGENCY DEPT VISIT MOD MDM: CPT | Mod: ,,, | Performed by: EMERGENCY MEDICINE

## 2022-08-25 PROCEDURE — D9220A PRA ANESTHESIA: ICD-10-PCS | Mod: CRNA,,, | Performed by: NURSE ANESTHETIST, CERTIFIED REGISTERED

## 2022-08-25 PROCEDURE — 25000003 PHARM REV CODE 250: Performed by: SURGERY

## 2022-08-25 PROCEDURE — 36000706: Performed by: SURGERY

## 2022-08-25 PROCEDURE — 36000707: Performed by: SURGERY

## 2022-08-25 PROCEDURE — D9220A PRA ANESTHESIA: Mod: ANES,,, | Performed by: ANESTHESIOLOGY

## 2022-08-25 PROCEDURE — 63600175 PHARM REV CODE 636 W HCPCS: Performed by: STUDENT IN AN ORGANIZED HEALTH CARE EDUCATION/TRAINING PROGRAM

## 2022-08-25 PROCEDURE — 99284 PR EMERGENCY DEPT VISIT,LEVEL IV: ICD-10-PCS | Mod: ,,, | Performed by: EMERGENCY MEDICINE

## 2022-08-25 PROCEDURE — 35800 PR EXPLOR POSTOP BLEED,INFEC,CLOT-NECK: ICD-10-PCS | Mod: 78,,, | Performed by: SURGERY

## 2022-08-25 PROCEDURE — 25000003 PHARM REV CODE 250: Performed by: STUDENT IN AN ORGANIZED HEALTH CARE EDUCATION/TRAINING PROGRAM

## 2022-08-25 PROCEDURE — G0378 HOSPITAL OBSERVATION PER HR: HCPCS

## 2022-08-25 PROCEDURE — G0379 DIRECT REFER HOSPITAL OBSERV: HCPCS

## 2022-08-25 PROCEDURE — 37000009 HC ANESTHESIA EA ADD 15 MINS: Performed by: SURGERY

## 2022-08-25 PROCEDURE — 99285 EMERGENCY DEPT VISIT HI MDM: CPT | Mod: 25

## 2022-08-25 PROCEDURE — 35800 EXPLORE NECK VESSELS: CPT | Mod: 78,,, | Performed by: SURGERY

## 2022-08-25 PROCEDURE — 37000008 HC ANESTHESIA 1ST 15 MINUTES: Performed by: SURGERY

## 2022-08-25 PROCEDURE — 71000015 HC POSTOP RECOV 1ST HR: Performed by: SURGERY

## 2022-08-25 PROCEDURE — C1729 CATH, DRAINAGE: HCPCS | Performed by: SURGERY

## 2022-08-25 PROCEDURE — 00300 ANES ALL PX INTEG H/N/PTRUNK: CPT | Performed by: SURGERY

## 2022-08-25 RX ORDER — LISINOPRIL 2.5 MG/1
2.5 TABLET ORAL DAILY
Status: DISCONTINUED | OUTPATIENT
Start: 2022-08-26 | End: 2022-08-27 | Stop reason: HOSPADM

## 2022-08-25 RX ORDER — BUPIVACAINE HYDROCHLORIDE 5 MG/ML
INJECTION, SOLUTION EPIDURAL; INTRACAUDAL
Status: DISCONTINUED | OUTPATIENT
Start: 2022-08-25 | End: 2022-08-25 | Stop reason: HOSPADM

## 2022-08-25 RX ORDER — DEXMEDETOMIDINE HYDROCHLORIDE 100 UG/ML
INJECTION, SOLUTION INTRAVENOUS
Status: DISCONTINUED | OUTPATIENT
Start: 2022-08-25 | End: 2022-08-25

## 2022-08-25 RX ORDER — SERTRALINE HYDROCHLORIDE 25 MG/1
25 TABLET, FILM COATED ORAL DAILY
Status: DISCONTINUED | OUTPATIENT
Start: 2022-08-26 | End: 2022-08-27 | Stop reason: HOSPADM

## 2022-08-25 RX ORDER — SODIUM CHLORIDE, SODIUM LACTATE, POTASSIUM CHLORIDE, CALCIUM CHLORIDE 600; 310; 30; 20 MG/100ML; MG/100ML; MG/100ML; MG/100ML
INJECTION, SOLUTION INTRAVENOUS CONTINUOUS
Status: ACTIVE | OUTPATIENT
Start: 2022-08-25 | End: 2022-08-26

## 2022-08-25 RX ORDER — ACETAMINOPHEN 325 MG/1
650 TABLET ORAL EVERY 4 HOURS PRN
Status: DISCONTINUED | OUTPATIENT
Start: 2022-08-25 | End: 2022-08-27 | Stop reason: HOSPADM

## 2022-08-25 RX ORDER — SODIUM CHLORIDE 0.9 % (FLUSH) 0.9 %
3 SYRINGE (ML) INJECTION
Status: DISCONTINUED | OUTPATIENT
Start: 2022-08-25 | End: 2022-08-26 | Stop reason: HOSPADM

## 2022-08-25 RX ORDER — ROCURONIUM BROMIDE 10 MG/ML
INJECTION, SOLUTION INTRAVENOUS
Status: DISCONTINUED | OUTPATIENT
Start: 2022-08-25 | End: 2022-08-25

## 2022-08-25 RX ORDER — SUCCINYLCHOLINE CHLORIDE 20 MG/ML
INJECTION INTRAMUSCULAR; INTRAVENOUS
Status: DISCONTINUED | OUTPATIENT
Start: 2022-08-25 | End: 2022-08-25

## 2022-08-25 RX ORDER — ASPIRIN 81 MG/1
81 TABLET ORAL DAILY
Status: DISCONTINUED | OUTPATIENT
Start: 2022-08-26 | End: 2022-08-27 | Stop reason: HOSPADM

## 2022-08-25 RX ORDER — DULOXETIN HYDROCHLORIDE 30 MG/1
30 CAPSULE, DELAYED RELEASE ORAL DAILY
Status: DISCONTINUED | OUTPATIENT
Start: 2022-08-26 | End: 2022-08-27 | Stop reason: HOSPADM

## 2022-08-25 RX ORDER — NEOSTIGMINE METHYLSULFATE 0.5 MG/ML
INJECTION, SOLUTION INTRAVENOUS
Status: DISCONTINUED | OUTPATIENT
Start: 2022-08-25 | End: 2022-08-25

## 2022-08-25 RX ORDER — LABETALOL HYDROCHLORIDE 5 MG/ML
INJECTION, SOLUTION INTRAVENOUS
Status: DISCONTINUED | OUTPATIENT
Start: 2022-08-25 | End: 2022-08-25

## 2022-08-25 RX ORDER — CEFAZOLIN SODIUM/WATER 2 G/20 ML
SYRINGE (ML) INTRAVENOUS
Status: DISCONTINUED | OUTPATIENT
Start: 2022-08-25 | End: 2022-08-25

## 2022-08-25 RX ORDER — HEPARIN SODIUM 1000 [USP'U]/ML
INJECTION, SOLUTION INTRAVENOUS; SUBCUTANEOUS
Status: DISCONTINUED | OUTPATIENT
Start: 2022-08-25 | End: 2022-08-25 | Stop reason: HOSPADM

## 2022-08-25 RX ORDER — FENTANYL CITRATE 50 UG/ML
25 INJECTION, SOLUTION INTRAMUSCULAR; INTRAVENOUS EVERY 5 MIN PRN
Status: DISCONTINUED | OUTPATIENT
Start: 2022-08-25 | End: 2022-08-26 | Stop reason: HOSPADM

## 2022-08-25 RX ORDER — FENTANYL CITRATE 50 UG/ML
INJECTION, SOLUTION INTRAMUSCULAR; INTRAVENOUS
Status: DISCONTINUED | OUTPATIENT
Start: 2022-08-25 | End: 2022-08-25

## 2022-08-25 RX ORDER — TALC
3 POWDER (GRAM) TOPICAL NIGHTLY
Status: DISCONTINUED | OUTPATIENT
Start: 2022-08-25 | End: 2022-08-27 | Stop reason: HOSPADM

## 2022-08-25 RX ORDER — ONDANSETRON 2 MG/ML
4 INJECTION INTRAMUSCULAR; INTRAVENOUS EVERY 12 HOURS PRN
Status: DISCONTINUED | OUTPATIENT
Start: 2022-08-25 | End: 2022-08-27 | Stop reason: HOSPADM

## 2022-08-25 RX ORDER — ATORVASTATIN CALCIUM 40 MG/1
40 TABLET, FILM COATED ORAL DAILY
Refills: 3 | Status: DISCONTINUED | OUTPATIENT
Start: 2022-08-26 | End: 2022-08-27 | Stop reason: HOSPADM

## 2022-08-25 RX ORDER — TRAZODONE HYDROCHLORIDE 50 MG/1
50 TABLET ORAL NIGHTLY PRN
Status: DISCONTINUED | OUTPATIENT
Start: 2022-08-25 | End: 2022-08-27 | Stop reason: HOSPADM

## 2022-08-25 RX ORDER — OXYCODONE HYDROCHLORIDE 5 MG/1
5 TABLET ORAL EVERY 4 HOURS PRN
Status: DISCONTINUED | OUTPATIENT
Start: 2022-08-25 | End: 2022-08-27 | Stop reason: HOSPADM

## 2022-08-25 RX ORDER — SODIUM CHLORIDE 0.9 % (FLUSH) 0.9 %
10 SYRINGE (ML) INJECTION
Status: DISCONTINUED | OUTPATIENT
Start: 2022-08-25 | End: 2022-08-27 | Stop reason: HOSPADM

## 2022-08-25 RX ORDER — MUPIROCIN 20 MG/G
OINTMENT TOPICAL
Status: CANCELLED | OUTPATIENT
Start: 2022-08-25

## 2022-08-25 RX ORDER — LIDOCAINE HYDROCHLORIDE 10 MG/ML
INJECTION, SOLUTION INTRAVENOUS
Status: DISCONTINUED | OUTPATIENT
Start: 2022-08-25 | End: 2022-08-25

## 2022-08-25 RX ORDER — PROPOFOL 10 MG/ML
VIAL (ML) INTRAVENOUS
Status: DISCONTINUED | OUTPATIENT
Start: 2022-08-25 | End: 2022-08-25

## 2022-08-25 RX ORDER — TALC
6 POWDER (GRAM) TOPICAL NIGHTLY PRN
Status: DISCONTINUED | OUTPATIENT
Start: 2022-08-25 | End: 2022-08-27 | Stop reason: HOSPADM

## 2022-08-25 RX ORDER — HALOPERIDOL 5 MG/ML
0.5 INJECTION INTRAMUSCULAR EVERY 10 MIN PRN
Status: DISCONTINUED | OUTPATIENT
Start: 2022-08-25 | End: 2022-08-26 | Stop reason: HOSPADM

## 2022-08-25 RX ORDER — LIDOCAINE HYDROCHLORIDE 10 MG/ML
1 INJECTION, SOLUTION EPIDURAL; INFILTRATION; INTRACAUDAL; PERINEURAL ONCE
Status: CANCELLED | OUTPATIENT
Start: 2022-08-25 | End: 2022-08-25

## 2022-08-25 RX ORDER — CEFAZOLIN SODIUM 2 G/50ML
2 SOLUTION INTRAVENOUS ONCE
Status: DISCONTINUED | OUTPATIENT
Start: 2022-08-25 | End: 2022-08-27 | Stop reason: HOSPADM

## 2022-08-25 RX ADMIN — NEOSTIGMINE METHYLSULFATE 3.5 MG: 0.5 INJECTION INTRAVENOUS at 07:08

## 2022-08-25 RX ADMIN — SODIUM CHLORIDE, SODIUM GLUCONATE, SODIUM ACETATE, POTASSIUM CHLORIDE, MAGNESIUM CHLORIDE, SODIUM PHOSPHATE, DIBASIC, AND POTASSIUM PHOSPHATE: .53; .5; .37; .037; .03; .012; .00082 INJECTION, SOLUTION INTRAVENOUS at 06:08

## 2022-08-25 RX ADMIN — PROPOFOL 100 MG: 10 INJECTION, EMULSION INTRAVENOUS at 06:08

## 2022-08-25 RX ADMIN — SUCCINYLCHOLINE CHLORIDE 120 MG: 20 INJECTION, SOLUTION INTRAMUSCULAR; INTRAVENOUS at 06:08

## 2022-08-25 RX ADMIN — GLYCOPYRROLATE 0.4 MG: 0.2 INJECTION, SOLUTION INTRAMUSCULAR; INTRAVENOUS at 07:08

## 2022-08-25 RX ADMIN — PROPOFOL 50 MG: 10 INJECTION, EMULSION INTRAVENOUS at 06:08

## 2022-08-25 RX ADMIN — LABETALOL HYDROCHLORIDE 5 MG: 5 INJECTION, SOLUTION INTRAVENOUS at 07:08

## 2022-08-25 RX ADMIN — LIDOCAINE HYDROCHLORIDE 50 MG: 10 INJECTION, SOLUTION INTRAVENOUS at 06:08

## 2022-08-25 RX ADMIN — ROCURONIUM BROMIDE 30 MG: 10 INJECTION, SOLUTION INTRAVENOUS at 06:08

## 2022-08-25 RX ADMIN — PROPOFOL 30 MG: 10 INJECTION, EMULSION INTRAVENOUS at 07:08

## 2022-08-25 RX ADMIN — DEXMEDETOMIDINE HYDROCHLORIDE 12 MCG: 100 INJECTION, SOLUTION INTRAVENOUS at 07:08

## 2022-08-25 RX ADMIN — LABETALOL HYDROCHLORIDE 10 MG: 5 INJECTION, SOLUTION INTRAVENOUS at 06:08

## 2022-08-25 RX ADMIN — Medication 2 G: at 06:08

## 2022-08-25 RX ADMIN — SODIUM CHLORIDE, POTASSIUM CHLORIDE, SODIUM LACTATE AND CALCIUM CHLORIDE: 600; 310; 30; 20 INJECTION, SOLUTION INTRAVENOUS at 08:08

## 2022-08-25 RX ADMIN — MELATONIN TAB 3 MG 3 MG: 3 TAB at 10:08

## 2022-08-25 RX ADMIN — FENTANYL CITRATE 100 MCG: 50 INJECTION, SOLUTION INTRAMUSCULAR; INTRAVENOUS at 06:08

## 2022-08-25 NOTE — ED PROVIDER NOTES
Encounter Date: 2022       History     Chief Complaint   Patient presents with    Transfer     Transfer from [facility] for vascular surgery; left carotid endarterectomy . Was lifting weights today, began to feel swelling on left neck w/ pressure w/ hematoma.      56-year-old male transferred from outside hospital for vascular surgery evaluation.    PMH:  CVA, Broca's aphasia, hypertension, hyperlipidemia, carotid stenosis    Context:  Patient was originally evaluated in outside hospital.  He reports he was doing leg lifts.  A small amount of time later he went to stand up out of a chair noticed swelling to the left side of his neck. S/p carotid endarterectomy done 22.   Onset:  Acute  Location:  Left neck  Duration:  Constant since onset  Associated Symptoms:  Denies voice change, denies difficulty breathing, denies extremity numbness    The history is provided by the patient and medical records. No  was used.     Review of patient's allergies indicates:   Allergen Reactions    Escitalopram Nausea And Vomiting    Mirtazapine Rash     History reviewed. No pertinent past medical history.  Past Surgical History:   Procedure Laterality Date    CAROTID ENDARTERECTOMY Left 2022    Procedure: ENDARTERECTOMY-CAROTID;  Surgeon: ROSANNA Servin III, MD;  Location: [facility] OR [address] [place] [address];  Service: Peripheral Vascular;  Laterality: Left;    EYE SURGERY      FOOT SURGERY      HAND SURGERY       Family History   Problem Relation Age of Onset    COPD Father             Cancer Sister         Bilateral Masectomy    Stroke Maternal Uncle         deceases     Social History     Tobacco Use    Smoking status: Former Smoker     Types: Pipe     Quit date: 2014     Years since quittin.6    Smokeless tobacco: Never Used   Substance Use Topics    Alcohol use: Not Currently    Drug use: Never     Review of Systems   Constitutional: Negative for fever.   HENT: Negative for  trouble swallowing and voice change.    Eyes: Negative for visual disturbance.   Respiratory: Negative for shortness of breath.    Cardiovascular: Negative for chest pain.   Gastrointestinal: Negative for vomiting.   Musculoskeletal: Positive for neck stiffness.   Skin: Negative for rash.   Allergic/Immunologic: Negative for food allergies.   Neurological: Negative for numbness.       Physical Exam     Initial Vitals   BP Pulse Resp Temp SpO2   08/25/22 1802 08/25/22 1802 08/25/22 1802 08/25/22 1804 08/25/22 1802   (!) 164/88 72 18 98.1 °F (36.7 °C) 99 %      MAP       --                Physical Exam    Nursing note and vitals reviewed.  Constitutional: He is not diaphoretic. No distress.   HENT:   Head: Normocephalic and atraumatic.       No drooling    Eyes: Right eye exhibits no discharge. Left eye exhibits no discharge.   Cardiovascular: Normal rate and regular rhythm.   Intact and equal radial pulses   Pulmonary/Chest: Breath sounds normal. No stridor. No respiratory distress.   Abdominal: Abdomen is soft. There is no abdominal tenderness.     Neurological: He is alert and oriented to person, place, and time.   Moving all extremities, slight weakness in RLE   Skin: Skin is warm. No rash noted.   Psychiatric: He has a normal mood and affect. His behavior is normal.         ED Course   Procedures  Labs Reviewed   POCT GLUCOSE MONITORING CONTINUOUS          Imaging Results    None          Medications   cefazolin (ANCEF) 2 gram in dextrose 5% 50 mL IVPB (premix) (2 g Intravenous Not Given 8/25/22 1930)   sodium chloride 0.9% flush 10 mL (has no administration in time range)   sodium chloride 0.9% flush 3 mL (has no administration in time range)   fentaNYL 50 mcg/mL injection 25 mcg (25 mcg Intravenous Not Given 8/25/22 2059)   haloperidol lactate injection 0.5 mg (has no administration in time range)   sodium chloride 0.9% flush 10 mL (has no administration in time range)   melatonin tablet 6 mg (has no  administration in time range)   aspirin EC tablet 81 mg (has no administration in time range)   DULoxetine DR capsule 30 mg (has no administration in time range)   lisinopriL tablet 2.5 mg (has no administration in time range)   melatonin tablet 3 mg (3 mg Oral Given 8/25/22 2207)   atorvastatin tablet 40 mg (has no administration in time range)   sertraline tablet 25 mg (has no administration in time range)   traZODone tablet 50 mg (has no administration in time range)   lactated ringers infusion ( Intravenous New Bag 8/25/22 2032)   acetaminophen tablet 650 mg (has no administration in time range)   oxyCODONE immediate release tablet 5 mg (has no administration in time range)   ondansetron injection 4 mg (has no administration in time range)     Medical Decision Making:   History:   Old Medical Records: I decided to obtain old medical records.  Old Records Summarized: records from another hospital.       <> Summary of Records: CTA:    1. Interval left carotid endarterectomy with at least 4 x 3.7 x 8.1 cm hematoma in the left sternocleidomastoid muscle without definite active extravasation.  2. Severe high-grade left and mild-moderate right stenosis in the mid V4 vertebral arteries.  3. 60-70% stenosis near the origin of the right ICA.    Labs reviewed: Leukocytosis, appears chronic, stable hemoglobin  Differential Diagnosis:   Including, but not limited to:    Hematoma  Arterial bleed  Anemia    Clinical Tests:   Lab Tests: Reviewed  Radiological Study: Reviewed  ED Management:  56-year-old male transferred room outside hospital for vascular surgery evaluation.  Vascular surgery at patient's bedside on arrival and patient taken emergently to the operating room for intervention.  Anesthesia also at bedside, no emergent airway intervention indicated in the emergency department.  Other:   I have discussed this case with another health care provider.                      Clinical Impression:   Final  diagnoses:  [S10.93XA] Hematoma of neck          ED Disposition Condition    Observation               Mike Vann MD  08/26/22 0019

## 2022-08-25 NOTE — ANESTHESIA PREPROCEDURE EVALUATION
08/25/2022  Adarsh Wheeler is a 56 y.o., male with left-sided neck hematoma. Minimal mass effect on airway on CT performed today.      Pre-op Assessment    I have reviewed the Patient Summary Reports.     I have reviewed the Nursing Notes. I have reviewed the NPO Status.   I have reviewed the Medications.     Review of Systems  Anesthesia Hx:  No problems with previous Anesthesia  History of prior surgery of interest to airway management or planning: Denies Family Hx of Anesthesia complications.   Denies Personal Hx of Anesthesia complications.   Hematology/Oncology:  Hematology Normal   Oncology Normal     EENT/Dental:EENT/Dental Normal   Cardiovascular:   Exercise tolerance: good Hypertension PVD hyperlipidemia ECG has been reviewed.    Pulmonary:  Pulmonary Normal    Renal/:  Renal/ Normal     Hepatic/GI:  Hepatic/GI Normal    Musculoskeletal:  Musculoskeletal Normal    Neurological:   CVA    Endocrine:  Endocrine Normal    Dermatological:  Skin Normal    Psych:  Psychiatric Normal           Physical Exam  General: Well nourished, Cooperative, Alert and Oriented    Airway:  Mallampati: II   Mouth Opening: Normal  TM Distance: Normal  Tongue: Normal  Neck ROM: Normal ROM    Dental:  Intact        Anesthesia Plan  Type of Anesthesia, risks & benefits discussed:    Anesthesia Type: Gen ETT  Intra-op Monitoring Plan: Standard ASA Monitors and Art Line  Post Op Pain Control Plan: multimodal analgesia and IV/PO Opioids PRN  Induction:  IV  Airway Plan: Direct, Post-Induction  Informed Consent: Informed consent signed with the Patient and all parties understand the risks and agree with anesthesia plan.  All questions answered.   ASA Score: 3 Emergent  Day of Surgery Review of History & Physical: H&P Update referred to the surgeon/provider.    Ready For Surgery From Anesthesia Perspective.     .

## 2022-08-25 NOTE — ANESTHESIA PROCEDURE NOTES
Intubation    Date/Time: 8/25/2022 6:29 PM  Performed by: Leland Burnham CRNA  Authorized by: Greg Thompson MD     Intubation:     Induction:  Intravenous    Intubated:  Postinduction    Attempts:  1    Attempted By:  CRNA    Method of Intubation:  Direct    Blade:  Sam 3    Laryngeal View Grade: Grade I - full view of cords      Difficult Airway Encountered?: No      Complications:  None    Airway Device:  Oral endotracheal tube    Airway Device Size:  7.5    Style/Cuff Inflation:  Cuffed (inflated to minimal occlusive pressure)    Inflation Amount (mL):  8    Tube secured:  21    Secured at:  The lips    Placement Verified By:  Capnometry    Complicating Factors:  None    Findings Post-Intubation:  BS equal bilateral and atraumatic/condition of teeth unchanged

## 2022-08-25 NOTE — SUBJECTIVE & OBJECTIVE
(Not in a hospital admission)      Review of patient's allergies indicates:   Allergen Reactions    Escitalopram Nausea And Vomiting    Mirtazapine Rash       History reviewed. No pertinent past medical history.  Past Surgical History:   Procedure Laterality Date    CAROTID ENDARTERECTOMY Left 2022    Procedure: ENDARTERECTOMY-CAROTID;  Surgeon: ROSANNA Servin III, MD;  Location: [facility] OR [address] [place] [address];  Service: Peripheral Vascular;  Laterality: Left;    EYE SURGERY      FOOT SURGERY      HAND SURGERY       Family History       Problem Relation (Age of Onset)    COPD Father    Cancer Sister    Stroke Maternal Uncle          Tobacco Use    Smoking status: Former Smoker     Types: Pipe     Quit date: 2014     Years since quittin.6    Smokeless tobacco: Never Used   Substance and Sexual Activity    Alcohol use: Not Currently    Drug use: Never    Sexual activity: Not Currently     Birth control/protection: None     Review of Systems   Constitutional:  Negative for activity change, chills, diaphoresis, fatigue and fever.   HENT:  Positive for facial swelling (neck swelling over left side). Negative for trouble swallowing and voice change.    Eyes:  Negative for visual disturbance.   Respiratory:  Negative for shortness of breath, wheezing and stridor.    Cardiovascular:  Negative for chest pain.   Neurological:  Negative for dizziness and syncope.   Objective:     Vital Signs (Most Recent):  Temp: 98.1 °F (36.7 °C) (22 1804)  Pulse: 72 (22 1802)  Resp: 18 (22 1802)  BP: (!) 164/88 (22 1802)  SpO2: 99 % (22 180)   Vital Signs (24h Range):  Temp:  [97.7 °F (36.5 °C)-98.1 °F (36.7 °C)] 98.1 °F (36.7 °C)  Pulse:  [61-72] 72  Resp:  [18] 18  SpO2:  [99 %-100 %] 99 %  BP: (146-164)/() 164/88        There is no height or weight on file to calculate BMI.    Physical Exam  Constitutional:       Appearance: Normal appearance.   HENT:      Head:      Comments: Large swelling over left  neck the size of softball, previous incision CDI, tender to palpation, no skin breakdown. Left side facial droop  Cardiovascular:      Rate and Rhythm: Normal rate and regular rhythm.   Pulmonary:      Effort: Pulmonary effort is normal.      Breath sounds: Normal breath sounds.   Neurological:      Mental Status: He is alert.      Comments: Decreased RUE strength 4/5 compared to 5/5 right       Significant Labs:  Recent Lab Results         08/25/22  1459        Albumin 4.5       Alkaline Phosphatase 84       ALT 38       Anion Gap 13       aPTT 23.6  Comment: PTT normal range is not established for pediatrics.       AST 30       Baso # 0.07       Basophil % 0.5       BILIRUBIN TOTAL 0.5       BUN 12       Calcium 9.0       Chloride 100       CO2 26       Creatinine 0.95       Differential Method Automated       eGFR >60       Eos # 0.3       Eosinophil % 2.2       Glucose 113  Comment: The ADA recommends the following guidelines for fasting glucose:    Normal:       less than 100 mg/dL    Prediabetes:  100 mg/dL to 125 mg/dL    Diabetes:     126 mg/dL or higher         Gran # (ANC) 11.8       Gran % 76.5       Group & Rh O NEG       Hematocrit 45.4       Hemoglobin 14.9       Immature Grans (Abs) 0.07  Comment: Mild elevation in immature granulocytes is non specific and   can be seen in a variety of conditions including stress response,   acute inflammation, trauma and pregnancy. Correlation with other   laboratory and clinical findings is essential.         Immature Granulocytes 0.5       Indirect Felipe GEL NEG       INR 1.0       Lymph # 2.4       Lymph % 15.3       MCH 29.8       MCHC 32.8       MCV 91       Mono # 0.8       Mono % 5.0       MPV 10.0       nRBC 0       Platelets 287       Potassium 3.8       PROTEIN TOTAL 8.2       PT 13.4  Comment: PT normal range is not established for pediatrics.       RBC 5.00       RDW 12.7       Sodium 139       WBC 15.42               Significant  Diagnostics:  EXAMINATION:  CTA HEAD AND NECK (XPD)     CLINICAL HISTORY:  Dizziness, persistent/recurrent, cardiac or vascular cause suspected; Pt states left carotid surgery 3 weeks ago at [facility] main campus. Today was doing leg lifts and started feeling a stinging pressure to the left neck, large amount of swelling noted.  Left carotid endarterectomy on 08/17/2022     TECHNIQUE:  Non contrast low dose axial images were obtained thought the head. CT angiogram was performed from the level of the abril to the top of the head following the IV administration of 80mL of Omnipaque 350.   Sagittal and coronal, 3D and maximum intensity projection reconstructions were performed. Two additional phases of immediate post-contrast CT images were performed through the head alone. Arterial stenosis percentages are based on NASCET measurement criteria.  Dose reduction techniques including automatic exposure control (AEC) were utilized.     Dose (DLP): 4159 mGycm     COMPARISON:  CTA head neck, 08/07/2022.     FINDINGS:  CTA HEAD:     INTRACRANIAL: Old left MCA territory infarct involving the basal ganglia and left frontal lobe, unchanged.  Mild ex vacuo enlargement of the frontal horn and anterior body of the left lateral ventricle.  No new loss of gray-white differentiation.  No acute intracranial hemorrhage.  No hydrocephalus.  No intracranial mass effect.  No abnormal intracranial enhancement.     SINUSES: Visualized paranasal sinuses and mastoids are clear.     SKULL/SCALP: Visualized osseous structures are normal.     ORBITS: Visualized orbits are normal.     VASCULATURE: High-grade stenosis of the mid left V4 vertebral artery is again demonstrated.  Mild-moderate stenosis in the mid right V4 vertebral artery.  Mild bilateral carotid siphon calcifications.  No hemodynamically significant stenosis no intracranial aneurysm.  No vascular malformation.  Deep cerebral veins and dural venous sinuses enhance normally.     CTA  NECK:     AORTIC ARCH: Conventional aortic arch anatomy.     LEFT CAROTID     COMMON CAROTID: No occlusion, hemodynamically significant stenosis, dissection or aneurysm.     INTERNAL CAROTID: Interval carotid endarterectomy with significant improvement in caliber of the left internal carotid artery, now normal.  No occlusion, hemodynamically significant stenosis, dissection or aneurysm.     RIGHT CAROTID     COMMON CAROTID: No occlusion, hemodynamically significant stenosis, dissection or aneurysm.     INTERNAL CAROTID: Moderate atherosclerotic calcifications near the origin with approximately 60-70% diameter stenosis.  No occlusion, dissection or aneurysm.     VERTEBRAL ARTERIES     LEFT VERTEBRAL: No occlusion, hemodynamically significant stenosis, dissection or aneurysm.     RIGHT VERTEBRAL: No occlusion, hemodynamically significant stenosis, dissection or aneurysm.     OTHER: There is an at least 4 x 3.7 x 8.1 cm (AP x TR x CC) hematoma in the left sternocleidomastoid muscle (series 5, image 26, series 15, image 28) with thickening of muscle more distally and possibly additional hematoma caudally.  No evidence of active extravasation.  Surrounding stranding.  Mild mass effect on the upper airway without compression.  Visualized upper lungs are clear.  No acute fracture or aggressive osseous lesion.  No significant pathology in the visualized cervical soft tissues.     Impression:     1. Interval left carotid endarterectomy with at least 4 x 3.7 x 8.1 cm hematoma in the left sternocleidomastoid muscle without definite active extravasation.  2. Severe high-grade left and mild-moderate right stenosis in the mid V4 vertebral arteries.  3. 60-70% stenosis near the origin of the right ICA.        Electronically signed by: Kilo Connor MD  Date:                                            08/25/2022  Time:                                           16:17

## 2022-08-25 NOTE — H&P
[facility] Dept  Vascular Surgery  History and Physical     Patient Name: Adarsh Wheeler  MRN: [id number]  Admission Date: (Not on file)  Code Status: Full Code   Attending Physician: Brian  Primary Care Physician: Stefan Woods MD    Subjective:     Chief Complaint/Reason for Admission: Left neck hematoma     HPI:  Adarsh Wheeler is a 56 y.o. man who was admitted 1 week ago for left carotid endarterectomy for symptomatic carotid artery stenosis. He was at home in [facility] usual state of health when today he was doing some leg lifts and after getting up from his chair noticed that his left neck started swelling. He denies any syncope, light headedness, changes in vision, sensation or motor function (baseline residual deficits from previous left sided stroke are stable). He denies any fevers, chills, sweats. He has not been taking aspirin as was prescribed.        (Not in a hospital admission)      Review of patient's allergies indicates:   Allergen Reactions    Escitalopram Nausea And Vomiting    Mirtazapine Rash       History reviewed. No pertinent past medical history.  Past Surgical History:   Procedure Laterality Date    CAROTID ENDARTERECTOMY Left 2022    Procedure: ENDARTERECTOMY-CAROTID;  Surgeon: ROSANNA Servin III, MD;  Location: [facility] OR [address] [place] [address];  Service: Peripheral Vascular;  Laterality: Left;    EYE SURGERY      FOOT SURGERY      HAND SURGERY       Family History       Problem Relation (Age of Onset)    COPD Father    Cancer Sister    Stroke Maternal Uncle          Tobacco Use    Smoking status: Former Smoker     Types: Pipe     Quit date: 2014     Years since quittin.6    Smokeless tobacco: Never Used   Substance and Sexual Activity    Alcohol use: Not Currently    Drug use: Never    Sexual activity: Not Currently     Birth control/protection: None     Review of Systems   Constitutional:  Negative for activity change, chills, diaphoresis, fatigue and  fever.   HENT:  Positive for facial swelling (neck swelling over left side). Negative for trouble swallowing and voice change.    Eyes:  Negative for visual disturbance.   Respiratory:  Negative for shortness of breath, wheezing and stridor.    Cardiovascular:  Negative for chest pain.   Neurological:  Negative for dizziness and syncope.   Objective:     Vital Signs (Most Recent):  Temp: 98.1 °F (36.7 °C) (08/25/22 1804)  Pulse: 72 (08/25/22 1802)  Resp: 18 (08/25/22 1802)  BP: (!) 164/88 (08/25/22 1802)  SpO2: 99 % (08/25/22 1802)   Vital Signs (24h Range):  Temp:  [97.7 °F (36.5 °C)-98.1 °F (36.7 °C)] 98.1 °F (36.7 °C)  Pulse:  [61-72] 72  Resp:  [18] 18  SpO2:  [99 %-100 %] 99 %  BP: (146-164)/() 164/88        There is no height or weight on file to calculate BMI.    Physical Exam  Constitutional:       Appearance: Normal appearance.   HENT:      Head:      Comments: Large swelling over left neck the size of softball, previous incision CDI, tender to palpation, no skin breakdown. Left side facial droop  Cardiovascular:      Rate and Rhythm: Normal rate and regular rhythm.   Pulmonary:      Effort: Pulmonary effort is normal.      Breath sounds: Normal breath sounds.   Neurological:      Mental Status: He is alert.      Comments: Decreased RUE strength 4/5 compared to 5/5 right       Significant Labs:  Recent Lab Results         08/25/22  1459        Albumin 4.5       Alkaline Phosphatase 84       ALT 38       Anion Gap 13       aPTT 23.6  Comment: PTT normal range is not established for pediatrics.       AST 30       Baso # 0.07       Basophil % 0.5       BILIRUBIN TOTAL 0.5       BUN 12       Calcium 9.0       Chloride 100       CO2 26       Creatinine 0.95       Differential Method Automated       eGFR >60       Eos # 0.3       Eosinophil % 2.2       Glucose 113  Comment: The ADA recommends the following guidelines for fasting glucose:    Normal:       less than 100 mg/dL    Prediabetes:  100 mg/dL to 125  mg/dL    Diabetes:     126 mg/dL or higher         Gran # (ANC) 11.8       Gran % 76.5       Group & Rh O NEG       Hematocrit 45.4       Hemoglobin 14.9       Immature Grans (Abs) 0.07  Comment: Mild elevation in immature granulocytes is non specific and   can be seen in a variety of conditions including stress response,   acute inflammation, trauma and pregnancy. Correlation with other   laboratory and clinical findings is essential.         Immature Granulocytes 0.5       Indirect Felipe GEL NEG       INR 1.0       Lymph # 2.4       Lymph % 15.3       MCH 29.8       MCHC 32.8       MCV 91       Mono # 0.8       Mono % 5.0       MPV 10.0       nRBC 0       Platelets 287       Potassium 3.8       PROTEIN TOTAL 8.2       PT 13.4  Comment: PT normal range is not established for pediatrics.       RBC 5.00       RDW 12.7       Sodium 139       WBC 15.42               Significant Diagnostics:  EXAMINATION:  CTA HEAD AND NECK (XPD)     CLINICAL HISTORY:  Dizziness, persistent/recurrent, cardiac or vascular cause suspected; Pt states left carotid surgery 3 weeks ago at [facility] main [facility]. Today was doing leg lifts and started feeling a stinging pressure to the left neck, large amount of swelling noted.  Left carotid endarterectomy on 08/17/2022     TECHNIQUE:  Non contrast low dose axial images were obtained thought the head. CT angiogram was performed from the level of the abril to the top of the head following the IV administration of 80mL of Omnipaque 350.   Sagittal and coronal, 3D and maximum intensity projection reconstructions were performed. Two additional phases of immediate post-contrast CT images were performed through the head alone. Arterial stenosis percentages are based on NASCET measurement criteria.  Dose reduction techniques including automatic exposure control (AEC) were utilized.     Dose (DLP): 4159 mGycm     COMPARISON:  CTA head neck, 08/07/2022.     FINDINGS:  CTA HEAD:     INTRACRANIAL: Old left  MCA territory infarct involving the basal ganglia and left frontal lobe, unchanged.  Mild ex vacuo enlargement of the frontal horn and anterior body of the left lateral ventricle.  No new loss of gray-white differentiation.  No acute intracranial hemorrhage.  No hydrocephalus.  No intracranial mass effect.  No abnormal intracranial enhancement.     SINUSES: Visualized paranasal sinuses and mastoids are clear.     SKULL/SCALP: Visualized osseous structures are normal.     ORBITS: Visualized orbits are normal.     VASCULATURE: High-grade stenosis of the mid left V4 vertebral artery is again demonstrated.  Mild-moderate stenosis in the mid right V4 vertebral artery.  Mild bilateral carotid siphon calcifications.  No hemodynamically significant stenosis no intracranial aneurysm.  No vascular malformation.  Deep cerebral veins and dural venous sinuses enhance normally.     CTA NECK:     AORTIC ARCH: Conventional aortic arch anatomy.     LEFT CAROTID     COMMON CAROTID: No occlusion, hemodynamically significant stenosis, dissection or aneurysm.     INTERNAL CAROTID: Interval carotid endarterectomy with significant improvement in caliber of the left internal carotid artery, now normal.  No occlusion, hemodynamically significant stenosis, dissection or aneurysm.     RIGHT CAROTID     COMMON CAROTID: No occlusion, hemodynamically significant stenosis, dissection or aneurysm.     INTERNAL CAROTID: Moderate atherosclerotic calcifications near the origin with approximately 60-70% diameter stenosis.  No occlusion, dissection or aneurysm.     VERTEBRAL ARTERIES     LEFT VERTEBRAL: No occlusion, hemodynamically significant stenosis, dissection or aneurysm.     RIGHT VERTEBRAL: No occlusion, hemodynamically significant stenosis, dissection or aneurysm.     OTHER: There is an at least 4 x 3.7 x 8.1 cm (AP x TR x CC) hematoma in the left sternocleidomastoid muscle (series 5, image 26, series 15, image 28) with thickening of muscle  more distally and possibly additional hematoma caudally.  No evidence of active extravasation.  Surrounding stranding.  Mild mass effect on the upper airway without compression.  Visualized upper lungs are clear.  No acute fracture or aggressive osseous lesion.  No significant pathology in the visualized cervical soft tissues.     Impression:     1. Interval left carotid endarterectomy with at least 4 x 3.7 x 8.1 cm hematoma in the left sternocleidomastoid muscle without definite active extravasation.  2. Severe high-grade left and mild-moderate right stenosis in the mid V4 vertebral arteries.  3. 60-70% stenosis near the origin of the right ICA.        Electronically signed by: Kilo Connor MD  Date:                                            08/25/2022  Time:                                           16:17    Assessment and Plan:     Hematoma of neck  Adarsh Wheeler is a 56 y.o. man who was admitted 1 week ago for left carotid endarterectomy for symptomatic carotid artery stenosis. He was at home in [facility] usual state of health when today he was doing some leg lifts and after getting up from his chair noticed that his left neck started swelling. He denies any syncope, light headedness, changes in vision, sensation or motor function (baseline residual deficits from previous left sided stroke are stable). He denies any fevers, chills, sweats. He has not been taking aspirin as was prescribed.      OR for evacuation of hematoma  NPO  Ancef OCTOR  IV with fluids  Obtain signed consent        PEGGY RETANA MD  Vascular Surgery  [facility] Dept

## 2022-08-25 NOTE — ASSESSMENT & PLAN NOTE
Adarsh Wheeler is a 56 y.o. man who was admitted 1 week ago for left carotid endarterectomy for symptomatic carotid artery stenosis. He was at home in [facility] usual state of health when today he was doing some leg lifts and after getting up from his chair noticed that his left neck started swelling. He denies any syncope, light headedness, changes in vision, sensation or motor function (baseline residual deficits from previous left sided stroke are stable). He denies any fevers, chills, sweats. He has not been taking aspirin as was prescribed.      OR for evacuation of hematoma  NPO  Ancef OCTOR  IV with fluids  Obtain signed consent

## 2022-08-25 NOTE — HPI
Adarsh Wheeler is a 56 y.o. man who was admitted 1 week ago for left carotid endarterectomy for symptomatic carotid artery stenosis. He was at home in [facility] usual state of health when today he was doing some leg lifts and after getting up from his chair noticed that his left neck started swelling. He denies any syncope, light headedness, changes in vision, sensation or motor function (baseline residual deficits from previous left sided stroke are stable). He denies any fevers, chills, sweats. He has not been taking aspirin as was prescribed.

## 2022-08-26 PROCEDURE — 25000003 PHARM REV CODE 250: Performed by: STUDENT IN AN ORGANIZED HEALTH CARE EDUCATION/TRAINING PROGRAM

## 2022-08-26 PROCEDURE — 94761 N-INVAS EAR/PLS OXIMETRY MLT: CPT

## 2022-08-26 PROCEDURE — G0378 HOSPITAL OBSERVATION PER HR: HCPCS

## 2022-08-26 RX ADMIN — ASPIRIN 81 MG: 81 TABLET, COATED ORAL at 09:08

## 2022-08-26 RX ADMIN — MELATONIN TAB 3 MG 3 MG: 3 TAB at 09:08

## 2022-08-26 RX ADMIN — LISINOPRIL 2.5 MG: 2.5 TABLET ORAL at 10:08

## 2022-08-26 RX ADMIN — ACETAMINOPHEN 650 MG: 325 TABLET ORAL at 09:08

## 2022-08-26 RX ADMIN — ATORVASTATIN CALCIUM 40 MG: 40 TABLET, FILM COATED ORAL at 10:08

## 2022-08-26 NOTE — PLAN OF CARE
Patient AAOx4. Minimal complaints of pain to left incisional neck; denies need for pain medication. Dressing and drain to site remain CDI. VSS. Tolerating PO intake. Sister updated via text. Safety maintained.

## 2022-08-26 NOTE — PLAN OF CARE
Plan of care reviewed with pt, who verbalized understanding. Pt received from PACU this afternoon. L neck incision w/ CDI gauze dressing. No further swelling noted, L neck COLLINS w/ minimal serosanguinous drainage. Pain remains minimal at this time. Tolerating regular diet. Call bell in reach, bed locked in lowest position. Frequent rounds made for pt safety. Aaox4, VSS.

## 2022-08-26 NOTE — SUBJECTIVE & OBJECTIVE
Medications:  Continuous Infusions:  Scheduled Meds:   aspirin  81 mg Oral Daily    atorvastatin  40 mg Oral Daily    ceFAZolin (ANCEF) IVPB  2 g Intravenous Once    DULoxetine  30 mg Oral Daily    lisinopriL  2.5 mg Oral Daily    melatonin  3 mg Oral QHS    sertraline  25 mg Oral Daily     PRN Meds:acetaminophen, fentaNYL, haloperidol lactate, melatonin, ondansetron, oxyCODONE, sodium chloride 0.9%, sodium chloride 0.9%, sodium chloride 0.9%, traZODone     Objective:     Vital Signs (Most Recent):  Temp: 98 °F (36.7 °C) (08/26/22 0800)  Pulse: 68 (08/26/22 0800)  Resp: 13 (08/26/22 0800)  BP: (!) 114/59 (08/26/22 0800)  SpO2: 96 % (08/26/22 0800)   Vital Signs (24h Range):  Temp:  [97.7 °F (36.5 °C)-98.1 °F (36.7 °C)] 98 °F (36.7 °C)  Pulse:  [48-72] 68  Resp:  [13-20] 13  SpO2:  [95 %-100 %] 96 %  BP: ()/() 114/59     Date 08/26/22 0700 - 08/27/22 0659   Shift 5046-9767 3106-6770 3292-7094 24 Hour Total   INTAKE   P.O. 240   240   Shift Total 240   240   OUTPUT   Urine 250   250   Shift Total 250   250   Weight (kg)           Physical Exam  Constitutional:       General: He is not in acute distress.  HENT:      Head: Normocephalic and atraumatic.   Eyes:      Extraocular Movements: Extraocular movements intact.      Conjunctiva/sclera: Conjunctivae normal.      Pupils: Pupils are equal, round, and reactive to light.   Neck:      Comments: Incision c/d/I with clean dressing.   Minimal swelling. COLLINS drain with minimal serosanguinous output.  Cardiovascular:      Rate and Rhythm: Normal rate and regular rhythm.   Pulmonary:      Effort: Pulmonary effort is normal. No respiratory distress.   Abdominal:      General: There is no distension.      Palpations: Abdomen is soft.   Neurological:      General: No focal deficit present.      Mental Status: He is alert.      Comments: CN intact. No tongue deviation or facial asymmetry.        Significant Labs:  CBC:   Recent Labs   Lab 08/25/22  1459   WBC 15.42*    RBC 5.00   HGB 14.9   HCT 45.4      MCV 91   MCH 29.8   MCHC 32.8     CMP:   Recent Labs   Lab 08/25/22  1459   *   CALCIUM 9.0   ALBUMIN 4.5   PROT 8.2      K 3.8   CO2 26      BUN 12   CREATININE 0.95   ALKPHOS 84   ALT 38   AST 30   BILITOT 0.5       Significant Diagnostics:  I have reviewed all pertinent imaging results/findings within the past 24 hours.

## 2022-08-26 NOTE — NURSING TRANSFER
Nursing Transfer Note      8/26/2022     Reason patient is being transferred: PACU to room    Transfer to: 1022    Transfer via: Stretcher    Transfer with: Face Mask in place    Transported by: PCT x2    Medicines sent: No    Any special needs or follow-up needed: No    Chart send with patient: Yes    Notified: sister; Report called to JUANJOSE Roach    Patient reassessed at: 1142

## 2022-08-26 NOTE — OP NOTE
Vascular Surgery Op Note    Date of Operation/Procedure: 8/25/22    Pre-operative Diagnosis: left neck hematoma    Post-operative Diagnosis: same    Anesthesia: general    Operation/Procedure Performed: left neck hematoma evacuation    Attending Surgeon: ROCCO Torres II, MD    Resident/Fellow: Mesfin Haas MD PGY6    Indications:   55yo M who underwent L carotid endarterectomy last week and experienced worsening left neck swelling today. Decision was made to take him to the OR due to size of the hematoma and growth of the hematoma over the course of the day.     Procedure in Detail:   The patient was brought to the operating room in supine position.  General anesthesia was administered by the anesthesia team.  The patient was positioned beach chair position the left neck was prepped and draped in normal sterile fashion.  Preoperative antibiotics were administered.  A time-out was performed to confirm appropriate patient identification, procedure, laterality.    We began by opening the previous left neck incision with a 15 blade and immediately encountered some hematoma after we divided the previous sutures that approximated the platysma.  We then evacuated the hematoma and thoroughly irrigated the entire wound with saline.  As much hematoma as we could find was removed and the wound was again irrigated with saline to look for any possible source of bleeding.  A small pumping artery was found on the medial aspect of the sternocleidomastoid and was controlled with a figure-of-eight 3-0 silk suture.  The remainder of the wound was inspected and no obvious source of bleeding was encountered.  We placed sterile water within the wound to try and determine any other unidentified source of bleeding could not find any other source of bleeding.  The wound was again thoroughly irrigated with saline and reinspected and no other source of bleeding could be found.  We then placed a 7 mm flat COLLINS drain in the wound  which was brought out through a counter incision inferior lateral to the previous incision beneath the platysma.  The drain was secured to the skin with a 3-0 nylon suture.  We then closed the platysma with running 3-0 Vicryl suture.  The skin was then closed with staples.  The incision was dressed with 4 x 4 gauze and Medipore tape.  Patient awoke from anesthesia in stable condition and tolerated the procedure well.    Estimated Blood loss: 50ml    Complications: none    ROCCO Torres II, MD, VI  Vascular Surgery  [facility]

## 2022-08-26 NOTE — PROGRESS NOTES
[facility] - Surgery ([facility])  Vascular Surgery  Progress Note    Patient Name: Adarsh Wheeler  MRN: [id number]  Admission Date: 8/25/2022  Primary Care Provider: Stefan Woods MD    Subjective:     Interval History:   No acute events overnight.   POD 1 from neck exploration and hematoma evacuation.   AFVSS.   COLLINS drain with only 14cc recorded output.  Pain well controlled.   Spontaneously voiding.       Post-Op Info:  Procedure(s) (LRB):  RE-EXPLORATION, FOR BLEEDING, (Left)  EVACUATION, HEMATOMA (Left)   1 Day Post-Op       Medications:  Continuous Infusions:  Scheduled Meds:   aspirin  81 mg Oral Daily    atorvastatin  40 mg Oral Daily    ceFAZolin (ANCEF) IVPB  2 g Intravenous Once    DULoxetine  30 mg Oral Daily    lisinopriL  2.5 mg Oral Daily    melatonin  3 mg Oral QHS    sertraline  25 mg Oral Daily     PRN Meds:acetaminophen, fentaNYL, haloperidol lactate, melatonin, ondansetron, oxyCODONE, sodium chloride 0.9%, sodium chloride 0.9%, sodium chloride 0.9%, traZODone     Objective:     Vital Signs (Most Recent):  Temp: 98 °F (36.7 °C) (08/26/22 0800)  Pulse: 68 (08/26/22 0800)  Resp: 13 (08/26/22 0800)  BP: (!) 114/59 (08/26/22 0800)  SpO2: 96 % (08/26/22 0800)   Vital Signs (24h Range):  Temp:  [97.7 °F (36.5 °C)-98.1 °F (36.7 °C)] 98 °F (36.7 °C)  Pulse:  [48-72] 68  Resp:  [13-20] 13  SpO2:  [95 %-100 %] 96 %  BP: ()/() 114/59     Date 08/26/22 0700 - 08/27/22 0659   Shift 9157-4218 7032-8500 7269-0682 24 Hour Total   INTAKE   P.O. 240   240   Shift Total 240   240   OUTPUT   Urine 250   250   Shift Total 250   250   Weight (kg)           Physical Exam  Constitutional:       General: He is not in acute distress.  HENT:      Head: Normocephalic and atraumatic.   Eyes:      Extraocular Movements: Extraocular movements intact.      Conjunctiva/sclera: Conjunctivae normal.      Pupils: Pupils are equal, round, and reactive to light.   Neck:      Comments: Incision c/d/I with clean  dressing.   Minimal swelling. COLLINS drain with minimal serosanguinous output.  Cardiovascular:      Rate and Rhythm: Normal rate and regular rhythm.   Pulmonary:      Effort: Pulmonary effort is normal. No respiratory distress.   Abdominal:      General: There is no distension.      Palpations: Abdomen is soft.   Neurological:      General: No focal deficit present.      Mental Status: He is alert.      Comments: CN intact. No tongue deviation or facial asymmetry.        Significant Labs:  CBC:   Recent Labs   Lab 08/25/22  1459   WBC 15.42*   RBC 5.00   HGB 14.9   HCT 45.4      MCV 91   MCH 29.8   MCHC 32.8     CMP:   Recent Labs   Lab 08/25/22  1459   *   CALCIUM 9.0   ALBUMIN 4.5   PROT 8.2      K 3.8   CO2 26      BUN 12   CREATININE 0.95   ALKPHOS 84   ALT 38   AST 30   BILITOT 0.5       Significant Diagnostics:  I have reviewed all pertinent imaging results/findings within the past 24 hours.    Assessment/Plan:     Hematoma of neck  Adarsh Wheeler is a 56 y.o. man who was admitted 1 week ago for left carotid endarterectomy for symptomatic carotid artery stenosis. He was at home in [facility] state of health when today he was doing some leg lifts and after getting up from his chair noticed that his left neck started swelling. He denies any syncope, light headedness, changes in vision, sensation or motor function (baseline residual deficits from previous left sided stroke are stable). He denies any fevers, chills, sweats. He has not been taking aspirin as was prescribed.  Now s/p hematoma evacuation on 8/26/22.     Okay for regular diet  SBP <140  Monitor neck and COLLINS drain  PRN pain medication  Daily labs   Continue ASA 81  OOBTC and encourage ambulation   SCDs and DVT ppx    Dispo: possible discharge later this afternoon if stable        Homa Gage MD  Vascular Surgery  [facility] - Surgery (2nd Fl)

## 2022-08-26 NOTE — BRIEF OP NOTE
[facility] - Surgery (2nd Fl)  Brief Operative Note    SUMMARY     Surgery Date: 8/25/2022     Surgeon(s) and Role:     * ROCCO Torres II, MD - Primary     * Robyn Brown MD - Resident - Assisting     * Mesfin Haas MD - Fellow        Pre-op Diagnosis:  Neck swelling [R22.1]    Post-op Diagnosis:  Post-Op Diagnosis Codes:     * Neck swelling [R22.1]    Procedure(s) (LRB):  RE-EXPLORATION, FOR BLEEDING, (Left)  EVACUATION, HEMATOMA (Left)    Anesthesia: General    Operative Findings: hematoma in left carotid endarterectomy dissection field, one small artery in SCM suture ligated, raw surfaces with general oozy, 9F flat drain left in neck, Platysma closed with 3-0 vicryl interrupted, Skin closed with staples    Estimated Blood Loss: * No values recorded between 8/25/2022  6:47 PM and 8/25/2022  7:37 PM *    Estimated Blood Loss has been documented.         Specimens:   Specimen (24h ago, onward)            None          [id number]

## 2022-08-26 NOTE — ASSESSMENT & PLAN NOTE
Adarsh Wheeler is a 56 y.o. man who was admitted 1 week ago for left carotid endarterectomy for symptomatic carotid artery stenosis. He was at home in [facility] usual state of health when today he was doing some leg lifts and after getting up from his chair noticed that his left neck started swelling. He denies any syncope, light headedness, changes in vision, sensation or motor function (baseline residual deficits from previous left sided stroke are stable). He denies any fevers, chills, sweats. He has not been taking aspirin as was prescribed.  Now s/p hematoma evacuation on 8/26/22.     Okay for regular diet  SBP <140  Monitor neck and COLLINS drain  PRN pain medication  Daily labs   Continue ASA 81  OOBTC and encourage ambulation   SCDs and DVT ppx    Dispo: possible discharge later this afternoon if stable

## 2022-08-26 NOTE — ED NOTES
Bed: [facility] 05  Expected date: 8/24/22  Expected time:   Means of arrival:   Comments:

## 2022-08-26 NOTE — TRANSFER OF CARE
Anesthesia Transfer of Care Note    Patient: Adarsh Wheeler    Procedure(s) Performed: Procedure(s) (LRB):  RE-EXPLORATION, FOR BLEEDING, (Left)  EVACUATION, HEMATOMA (Left)    Patient location: PACU    Anesthesia Type: general    Transport from OR: Transported from OR on 6-10 L/min O2 by face mask with adequate spontaneous ventilation    Post pain: adequate analgesia    Post assessment: no apparent anesthetic complications and tolerated procedure well    Post vital signs: stable    Level of consciousness: awake    Nausea/Vomiting: no nausea/vomiting    Complications: none    Transfer of care protocol was followed      Last vitals:   Visit Vitals  BP (!) 91/50   Pulse (!) 51   Temp 36.5 °C (97.7 °F) (Temporal)   Resp 18   SpO2 99%     
160

## 2022-08-27 VITALS
HEART RATE: 54 BPM | BODY MASS INDEX: 26.91 KG/M2 | OXYGEN SATURATION: 98 % | HEIGHT: 71 IN | TEMPERATURE: 97 F | DIASTOLIC BLOOD PRESSURE: 73 MMHG | SYSTOLIC BLOOD PRESSURE: 117 MMHG | WEIGHT: 192.25 LBS | RESPIRATION RATE: 16 BRPM

## 2022-08-27 PROCEDURE — G0378 HOSPITAL OBSERVATION PER HR: HCPCS

## 2022-08-27 PROCEDURE — 25000003 PHARM REV CODE 250: Performed by: STUDENT IN AN ORGANIZED HEALTH CARE EDUCATION/TRAINING PROGRAM

## 2022-08-27 RX ORDER — OXYCODONE HYDROCHLORIDE 5 MG/1
5 TABLET ORAL EVERY 4 HOURS PRN
Qty: 15 TABLET | Refills: 0 | Status: SHIPPED | OUTPATIENT
Start: 2022-08-27 | End: 2023-07-11

## 2022-08-27 RX ADMIN — SERTRALINE HYDROCHLORIDE 25 MG: 25 TABLET ORAL at 08:08

## 2022-08-27 RX ADMIN — LISINOPRIL 2.5 MG: 2.5 TABLET ORAL at 08:08

## 2022-08-27 RX ADMIN — DULOXETINE 30 MG: 30 CAPSULE, DELAYED RELEASE ORAL at 08:08

## 2022-08-27 RX ADMIN — ASPIRIN 81 MG: 81 TABLET, COATED ORAL at 08:08

## 2022-08-27 RX ADMIN — ATORVASTATIN CALCIUM 40 MG: 40 TABLET, FILM COATED ORAL at 08:08

## 2022-08-27 NOTE — PROGRESS NOTES
PT PIV removed per order. D/C instructions provided, questions addressed, PT to car via AMB with sister.

## 2022-08-27 NOTE — PLAN OF CARE
POC reviewed with pt, verbalized understanding. VSS on RA. AAOX4. Remains free of falls and injury. Ambulated in room.    - L neck incision dressing, CDI  - L neck COLLINS, bloody drainage    Tolerating diet, denies nausea. Pain controlled. Pt denies chest pain & SOB. No acute events or distress noted. Bed in lowest position, call light in reach, frequent rounds made for safety.    WCTM.      Problem: Adult Inpatient Plan of Care  Goal: Plan of Care Review  Outcome: Ongoing, Progressing  Goal: Patient-Specific Goal (Individualized)  Outcome: Ongoing, Progressing  Goal: Absence of Hospital-Acquired Illness or Injury  Outcome: Ongoing, Progressing  Goal: Optimal Comfort and Wellbeing  Outcome: Ongoing, Progressing  Goal: Readiness for Transition of Care  Outcome: Ongoing, Progressing     Problem: Fall Injury Risk  Goal: Absence of Fall and Fall-Related Injury  Outcome: Ongoing, Progressing

## 2022-08-28 NOTE — DISCHARGE SUMMARY
[facility] - [facility]  General Surgery  Discharge Summary      Patient Name: Adarsh Wheeler  MRN: [id number]  Admission Date: 8/25/2022  Hospital Length of Stay: 0 days  Discharge Date and Time: 8/27/2022 12:40 PM  Attending Physician: ROCCO Torres MD  Discharging Provider: Robyn Brown MD  Primary Care Provider: Stefan Woods MD     HPI: Adarsh Wheeler is a 56 y.o. man who was admitted 1 week ago for left carotid endarterectomy for symptomatic carotid artery stenosis. He was at home in [facility] usual state of health when today he was doing some leg lifts and after getting up from his chair noticed that his left neck started swelling. He denies any syncope, light headedness, changes in vision, sensation or motor function (baseline residual deficits from previous left sided stroke are stable). He denies any fevers, chills, sweats. He has not been taking aspirin as was prescribed.      Procedure(s) (LRB):  RE-EXPLORATION, FOR BLEEDING, (Left)  EVACUATION, HEMATOMA (Left)     Hospital Course:  Patient was evaluated and consented preoperatively. He was admitted for neck hematoma after recent CEA and was taken back to the operating room where he underwent the above stated procedure. For full details, see operative note. He was transported to the recovery room in hemodynamically stable condition. He was admitted to the [facility] where he stayed for 2 nights. He is now tolerating PO intake and pain and nausea are well-controlled on PRN PO medications. He has no neurological deficits, has good strength in all extremities and CN are intact.  He is voiding spontaneously, having bowel movements and ambulating without assistance. His drain was removed, and output was minimal. He is ready for discharge.          Pending Diagnostic Studies:       None          Final Active Diagnoses:    Diagnosis Date Noted POA    PRINCIPAL PROBLEM:  Hematoma of neck [S10.93XA] 08/25/2022 Yes      Problems Resolved During this  Admission:      Discharged Condition: good    Disposition: Home or Self Care    Follow Up: In Clinic with Dr Marks for previously scheduled CEA follow up    Patient Instructions:      Diet Adult Regular     Notify your health care provider if you experience any of the following:  temperature >100.4     Notify your health care provider if you experience any of the following:  persistent nausea and vomiting or diarrhea     Notify your health care provider if you experience any of the following:  severe uncontrolled pain     Notify your health care provider if you experience any of the following:  difficulty breathing or increased cough     Remove dressing in 24 hours     Lifting restrictions   Order Comments: Avoid weight greater than 15lb after surgery for 6 weeks     Activity as tolerated     Weight bearing restrictions (specify):   Order Comments: Avoid lifting over 15 lbs for 6 weeks after surgery     Medications:  Reconciled Home Medications:      Medication List        START taking these medications      oxyCODONE 5 MG immediate release tablet  Commonly known as: ROXICODONE  Take 1 tablet (5 mg total) by mouth every 4 (four) hours as needed for Pain.            CONTINUE taking these medications      acetaminophen 325 MG tablet  Commonly known as: TYLENOL  Take 650 mg by mouth.     ASPIR-81 ORAL  Take by mouth every morning.     lactulose 10 gram/15 mL solution  Commonly known as: CHRONULAC  Take 20 g by mouth.     MELATONIN ORAL  Take by mouth every evening.     ondansetron 4 MG tablet  Commonly known as: ZOFRAN  Take 1 tablet by mouth every 6 (six) hours as needed.     polyethylene glycol 17 gram/dose powder  Commonly known as: GLYCOLAX  Take by mouth.     sertraline 25 MG tablet  Commonly known as: ZOLOFT  Take 25 mg by mouth once daily.     traZODone 100 MG tablet  Commonly known as: DESYREL  Take 0.5 tablets by mouth nightly as needed.            STOP taking these medications      clopidogreL 75 mg  tablet  Commonly known as: PLAVIX            ASK your doctor about these medications      DULoxetine 30 MG capsule  Commonly known as: CYMBALTA  Take 1 capsule (30 mg total) by mouth once daily.     lisinopriL 2.5 MG tablet  Commonly known as: PRINIVIL,ZESTRIL  Take 1 tablet (2.5 mg total) by mouth once daily.     rosuvastatin 40 MG Tab  Commonly known as: CRESTOR  Take 1 tablet (40 mg total) by mouth once daily.              Robyn Brown MD  General Surgery  [facility]

## 2022-09-03 NOTE — ANESTHESIA POSTPROCEDURE EVALUATION
Anesthesia Post Evaluation    Patient: Adarsh Wheeler    Procedure(s) Performed: Procedure(s) (LRB):  RE-EXPLORATION, FOR BLEEDING, (Left)  EVACUATION, HEMATOMA (Left)    Final Anesthesia Type: general      Patient location during evaluation: PACU  Patient participation: Yes- Able to Participate  Level of consciousness: awake and alert and oriented  Post-procedure vital signs: reviewed and stable  Pain management: adequate  Airway patency: patent    PONV status at discharge: No PONV  Anesthetic complications: no      Cardiovascular status: hemodynamically stable  Respiratory status: unassisted, spontaneous ventilation and room air  Hydration status: euvolemic  Follow-up not needed.          Vitals Value Taken Time   /73 08/27/22 0752   Temp 36.2 °C (97.1 °F) 08/27/22 0752   Pulse 54 08/27/22 0752   Resp 16 08/27/22 0752   SpO2 98 % 08/27/22 0752         Event Time   Out of Recovery 08/25/2022 20:15:00         Pain/Kamala Score: No data recorded

## 2022-09-13 ENCOUNTER — HOSPITAL ENCOUNTER (OUTPATIENT)
Dept: VASCULAR SURGERY | Facility: CLINIC | Age: 57
Discharge: HOME | End: 2022-09-13
Attending: SURGERY
Payer: OTHER GOVERNMENT

## 2022-09-13 ENCOUNTER — OFFICE VISIT (OUTPATIENT)
Dept: VASCULAR SURGERY | Facility: CLINIC | Age: 57
End: 2022-09-13
Payer: OTHER GOVERNMENT

## 2022-09-13 ENCOUNTER — OFFICE VISIT (OUTPATIENT)
Dept: PSYCHIATRY | Facility: CLINIC | Age: 57
End: 2022-09-13
Payer: OTHER GOVERNMENT

## 2022-09-13 VITALS
HEART RATE: 63 BPM | SYSTOLIC BLOOD PRESSURE: 125 MMHG | HEIGHT: 71 IN | WEIGHT: 180 LBS | DIASTOLIC BLOOD PRESSURE: 77 MMHG | BODY MASS INDEX: 25.2 KG/M2

## 2022-09-13 VITALS
DIASTOLIC BLOOD PRESSURE: 69 MMHG | WEIGHT: 178.56 LBS | HEIGHT: 71 IN | BODY MASS INDEX: 25 KG/M2 | HEART RATE: 71 BPM | TEMPERATURE: 99 F | SYSTOLIC BLOOD PRESSURE: 120 MMHG

## 2022-09-13 DIAGNOSIS — I65.22 LEFT CAROTID ARTERY STENOSIS: ICD-10-CM

## 2022-09-13 DIAGNOSIS — I65.23 BILATERAL CAROTID ARTERY STENOSIS: Primary | ICD-10-CM

## 2022-09-13 DIAGNOSIS — F43.10 PTSD (POST-TRAUMATIC STRESS DISORDER): Primary | ICD-10-CM

## 2022-09-13 DIAGNOSIS — Z98.890 S/P CAROTID ENDARTERECTOMY: ICD-10-CM

## 2022-09-13 PROCEDURE — 99999 PR PBB SHADOW E&M-EST. PATIENT-LVL II: ICD-10-PCS | Mod: PBBFAC,,, | Performed by: NURSE PRACTITIONER

## 2022-09-13 PROCEDURE — 90792 PR PSYCHIATRIC DIAGNOSTIC EVALUATION W/MEDICAL SERVICES: ICD-10-PCS | Mod: ,,, | Performed by: NURSE PRACTITIONER

## 2022-09-13 PROCEDURE — 99214 OFFICE O/P EST MOD 30 MIN: CPT | Mod: PBBFAC,27 | Performed by: SURGERY

## 2022-09-13 PROCEDURE — 93880 EXTRACRANIAL BILAT STUDY: CPT | Mod: 26,S$PBB,, | Performed by: SURGERY

## 2022-09-13 PROCEDURE — 90792 PSYCH DIAG EVAL W/MED SRVCS: CPT | Mod: ,,, | Performed by: NURSE PRACTITIONER

## 2022-09-13 PROCEDURE — 93880 EXTRACRANIAL BILAT STUDY: CPT | Mod: PBBFAC | Performed by: SURGERY

## 2022-09-13 PROCEDURE — 99999 PR PBB SHADOW E&M-EST. PATIENT-LVL IV: ICD-10-PCS | Mod: PBBFAC,,, | Performed by: SURGERY

## 2022-09-13 PROCEDURE — 99999 PR PBB SHADOW E&M-EST. PATIENT-LVL IV: CPT | Mod: PBBFAC,,, | Performed by: SURGERY

## 2022-09-13 PROCEDURE — 99024 POSTOP FOLLOW-UP VISIT: CPT | Mod: ,,, | Performed by: SURGERY

## 2022-09-13 PROCEDURE — 99999 PR PBB SHADOW E&M-EST. PATIENT-LVL II: CPT | Mod: PBBFAC,,, | Performed by: NURSE PRACTITIONER

## 2022-09-13 PROCEDURE — 99212 OFFICE O/P EST SF 10 MIN: CPT | Mod: PBBFAC,25 | Performed by: NURSE PRACTITIONER

## 2022-09-13 PROCEDURE — 99024 PR POST-OP FOLLOW-UP VISIT: ICD-10-PCS | Mod: ,,, | Performed by: SURGERY

## 2022-09-13 PROCEDURE — 93880 PR DUPLEX SCAN EXTRACRANIAL,BILAT: ICD-10-PCS | Mod: 26,S$PBB,, | Performed by: SURGERY

## 2022-09-13 NOTE — PROGRESS NOTES
VASCULAR SURGERY SERVICE    CHIEF COMPLAINT:  Major left hemispheric stroke    HISTORY OF PRESENT ILLNESS: Adarsh Wheeler is a 57 y.o. male [facility] who suffered a major left hemispheric stroke in December 2021. Per the sister sister SOFÍA he was found down after no contact for 4 days.  He initially was aphasic with severe motor deficits of his right arm and right leg.  He ultimately spent 8 months in rehab center was discharge proximally 2 months ago.  He is currently living independently.  He is also having some right neck pain    He denies any new symptoms of stroke TIA or amaurosis since this initial event.  However [facility] 6-8 weeks ago that had suggested a new potential subacute or extension of his prior left hemispheric stroke.    [facility] imaging also suggested a carotid siphon extension of his carotid stenosis on the left.    Is currently being managed with dual antiplatelet therapy and statin.  He is a nonsmoker.    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  He carries a diagnosis of posttraumatic stress syndrome    8/9/2022:  He now returns after CTA imaging.  No change in his prior history as detailed above.  Continues on dual antiplatelet therapy, and statin    S/p  Re-exploration for bleeding, left neck, 08/25/2022  Left carotid endarterectomy 08/17/2022 9/13/2022:  This is his 1st postoperative visit.  He had unremarkable initial postoperative course after his carotid endarterectomy on 08/17/2022 and went home on postoperative day 1.   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   He was seen in the [facility] ED, had a CTA which showed no evidence of arterial bleeding but a large left neck hematoma.  Was transferred oxygen underwent urgent decompression of this large neck hematoma as above.  No significant bleeding point was found.    Since discharge after the  re-exploration, I was week after the original endarterectomy, he has done well.  Denies any interval stroke TIA or amaurosis      History reviewed. No pertinent past medical history.    Past Surgical History:   Procedure Laterality Date    CAROTID ENDARTERECTOMY Left 8/17/2022    Procedure: ENDARTERECTOMY-CAROTID;  Surgeon: ROSANNA Servin III, MD;  Location: [address];  Service: Peripheral Vascular;  Laterality: Left;    EVACUATION OF HEMATOMA Left 8/25/2022    Procedure: EVACUATION, HEMATOMA;  Surgeon: ROCCO Torres II, MD;  Location: [facility] OR [address] [place] [address];  Service: Cardiovascular;  Laterality: Left;    EYE SURGERY      FOOT SURGERY      HAND SURGERY      RE-EXPLORATION FOR BLEEDING Left 8/25/2022    Procedure: RE-EXPLORATION, FOR BLEEDING,;  Surgeon: ROCCO Torres II, MD;  Location: [address];  Service: Cardiovascular;  Laterality: Left;         Current Outpatient Medications:     acetaminophen (TYLENOL) 325 MG tablet, Take 650 mg by mouth., Disp: , Rfl:     aspirin (ASPIR-81 ORAL), Take by mouth every morning., Disp: , Rfl:     DULoxetine (CYMBALTA) 30 MG capsule, Take 1 capsule (30 mg total) by mouth once daily. (Patient taking differently: Take 30 mg by mouth daily as needed.), Disp: 90 capsule, Rfl: 3    lactulose (CHRONULAC) 10 gram/15 mL solution, Take 20 g by mouth., Disp: , Rfl:     lisinopriL (PRINIVIL,ZESTRIL) 2.5 MG tablet, Take 1 tablet (2.5 mg total) by mouth once daily. (Patient taking differently: Take 2.5 mg by mouth every morning.), Disp: 90 tablet, Rfl: 3    MELATONIN ORAL, Take by mouth every evening., Disp: , Rfl:     ondansetron (ZOFRAN) 4 MG tablet, Take 1 tablet by mouth every 6 (six) hours as needed., Disp: , Rfl:     oxyCODONE (ROXICODONE) 5 MG immediate release tablet, Take 1 tablet (5 mg total) by mouth every 4 (four) hours as needed for Pain., Disp: 15 tablet, Rfl: 0    polyethylene glycol (GLYCOLAX) 17 gram/dose powder, Take by mouth., Disp: , Rfl:     rosuvastatin  "(CRESTOR) 40 MG Tab, Take 1 tablet (40 mg total) by mouth once daily. (Patient taking differently: Take 40 mg by mouth every evening.), Disp: 90 tablet, Rfl: 3    sertraline (ZOLOFT) 25 MG tablet, Take 25 mg by mouth once daily., Disp: , Rfl:     traZODone (DESYREL) 100 MG tablet, Take 0.5 tablets by mouth nightly as needed., Disp: , Rfl:     Review of patient's allergies indicates:   Allergen Reactions    Mirtazapine Rash       Family History   Problem Relation Age of Onset    COPD Father             Cancer Sister         Bilateral Masectomy    Stroke Maternal Uncle         deceases       Social History     Tobacco Use    Smoking status: Former     Types: Pipe     Quit date: 2014     Years since quittin.7    Smokeless tobacco: Never   Substance Use Topics    Alcohol use: Not Currently    Drug use: Never       PHYSICAL EXAM:   /69 (BP Location: Left arm, Patient Position: Sitting, BP Method: Large (Automatic))   Pulse 71   Temp 98.7 °F (37.1 °C) (Oral)   Ht 5' 11" (1.803 m)   Wt 81 kg (178 lb 9.2 oz)   BMI 24.91 kg/m²   Constitutional:  Alert,   Well-appearing  In no distress.   Neurological: Speech is relatively fluent, although he says that his speech is not completely returned to normal  RIGHT arms 4+ over 5 strength.   He ambulates without assistance, although he has slight right foot drag.  unchanged from preoperative status  no focal findings  CN II - XII grossly intact.    Psychiatric: Mood and affect appropriate and symmetric.   HEENT: Normocephalic / atraumatic  PERRLA  Midline trachea  Left of neck and cervical incisions will well healed with staples in place.  There is no residual swelling or ecchymosis   Cardiac: Regular rate and rhythm.   Pulmonary: Normal pulmonary effort.   Abdomen: Soft, not distended.     Skin: Warm and well perfused.    Vascular:  2+ radial pulses bilaterally   Extremities/  Musculoskeletal: No edema.        IMAGING:  Carotid duplex today reveals a right " 60-79% carotid stenosis with a peak velocity of 269 and diastolic 77 ratio of 3.1.  He has no significant residual left carotid stenosis    8/22 Recent carotid CTA was personally reviewed and demonstrates a 99% left cervical carotid stenosis with calcification.  The remainder of the upper cervical ICA  And siphon has diminished caliber throughout but is smooth.  There is a moderate right carotid stenosis      IMPRESSION:   1.  Near 4 week follow-up, left carotid endarterectomy, complicated by postoperative bleeding 1 week after discharge after heavy exercise.  He is now doing well    PLAN:    Continue dual antiplatelet therapy and statin  Follow-up in 6 weeks months with carotid duplex sooner if clinically indicated    RALPH Servin III, MD, FACS  Professor and Chief, Vascular and Endovascular Surgery
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
## 2022-09-22 NOTE — PROGRESS NOTES
[facility]  Speech Therapy Treatment Note- Neurological Rehabilitation  Date: 9/27/2022     Name: Adarsh Wheeler   MRN: [id number]   Therapy Diagnosis:   Encounter Diagnoses   Name Primary?    Broca's aphasia Yes    Apraxia of speech      Physician: Kush Serrano MD  Physician Orders: [id number] - AMB REFERRAL/CONSULT TO SPEECH THERAPY  Medical Diagnosis: I63.9 (ICD-10-CM) - Cerebral infarction, unspecified    Visit #/ Visits Authorized: 3/ 15  Date of Evaluation:  7/18/2022   Insurance Authorization Period:  6/14/22- 12/11/22   Plan of Care Expiration Date:    9/12/22  Extended Plan of Care:  n/a   Progress Note: 8/19/2022   Visits Cancelled: 0  Visits No Show: 0    Time In:  2:45  Time Out:  3:30  Total Billable Time: 45 minutes     Precautions: Standard  Subjective:   Patient reports: Patient is pleasant and was comfortable to sharing stories about his years in the [employer].   He was compliant to home exercise program.   Response to previous treatment: fair   Pain Scale:  0/10 on a Visual Analog Scale currently.   Pain Location: no pain indicated throughout session    Objective:           UNTIMED  Procedure Min.   Speech- Language- Voice Therapy  45   Total Timed Units: 0  Total Untimed Units: 1  Charges Billed/Number of units: 1    Short Term Goals: (4 weeks) Current Progress:   1. Pt will complete VNeST for 4 verbs per session given minimal cues.    Progressing/ Not Met 9/27/2022   Not addressed in today's session.     2. Pt will follow attentive reading and constraint summarization with moderate cues for story retell with given constraints.    Progressing/ Not Met 9/27/2022   Not addressed in today's session.     3. Pt will follow complex multiunit commands with 90% accuracy independently.    Progressing/ Not Met 9/27/2022   Patient was able to follow 2 step commands with 80% accuracy independently.    3 step commands with 60% accuracy independently. Given min-mod cues patient achieved 70%  accuracy.       4. Pt will answer complex multiunit yes/no questions with 90% accuracy independently.      Progressing/ Not Met 9/27/2022   Patient was able to answer complex multiunit yes/no questions with 80% accuracy.    Met x1   5. Pt will complete verbal reasoning tasks with minimal cues for accuracy.     Progressing/ Not Met 9/27/2022   Patient was presented with a problem scenario read auditorily. The patient was able to provide a verbal reasoning with 100% accuracy across 15 trials.       6. Pt will produce bisyllabic syllabic words with word initial /s/ with 85% acc across 3 probe trials.     Progressing/ Not Met 9/27/2022   See chart below.   7. Pt will produce bisyllabic syllabic words with word initial /r/ with 85% acc across 3 probe trials.        Progressing/ Not Met 9/27/2022   See chart below.       /s/ two syllable    Step 1 - Repetition / Minimal Contrast Word Step 2 - Printed Letter or Target Sound with Repetition Step 3- Integral Stimulation Step 4- Articulatory Placement Cues 5 Repetitions of Correct Production   [employer]  +        5   Kaylie  +        5   Sentence  +        5   Silent  +      +  4   Social  -      +  4   Someone  +  +      4   Sample  +        5   Success  +        5   Harris  -  +    +  3   Summer  +        5   [place]  -        5   Solid  +  +  +  +  3         /s/ multisyllable    Step 1 - Repetition / Minimal Contrast Word Step 2 - Printed Letter or Target Sound with Repetition Step 3- Integral Stimulation Step 4- Articulatory Placement Cues 5 Repetitions of Correct Production   Syllable  +        5/5 independently     -  +    +  3/5 given mod cues   Subtraction  +  +      4/5 given min cues   Solution  +  +      4/5 given mod cues   Salmonella  +        5/5 independently   September  +        5/5 independently   Segregation  -  +      4/5 given min cues   Silhouette  +  +  +  +  2/ 5 given max cues   Sophomore  +  +    +  3/5 given mod cues   Serious  +        5/5  independently   Sanity  +        5/5 independently   Surgery  -  +  +  +  1/5 given max cues       /r/ two syllable    Step 1 - Repetition / Minimal Contrast Word Step 2 - Printed Letter or Target Sound with Repetition Step 3- Integral Stimulation Step 4- Articulatory Placement Cues 5 Repetitions of Correct Production   Sunflower  +        5/5 independently   Rapper  +  +    +  5/5 given mod cues   Rhyming  -        5/5 independently   Riot  +        5/5 independently   Reason  +        5/5 independently   Respect  +        5/5 independently   Reading  +        5/5 independently   Romance  +        5/5 independently   Rocket  +        5/5 independently   Running  +        5/5 independently   Ruler  +  + +  +  3/5 given max cues   Chris  +        5/5 independently         /r/ multisyllable    Step 1 - Repetition / Minimal Contrast Word Step 2 - Printed Letter or Target Sound with Repetition Step 3- Integral Stimulation Step 4- Articulatory Placement Cues 5 Repetitions of Correct Production   Radio  +        5/5 independently   Rainforest  +        5/5 independently   Restaurant  +        5/5 independently   Relaxation  -  +  +  +  3/5 given mod cues   Righteousness  +  +  +  +  3/5 given mod cues   Renovation  -  + +   +  2/5 given max cues   Aaron  +        5/5 independently   Recycle  -      +  5/5 given min cues   Romanticize  +        5/5 independently   Rambunctious  +   +  +  +  3/5 given max cues   [place]iltskin  +  +  +  +  3/5 given max cues   Radioactive  +        5/5 independently         Patient Education/Response:   SLP reviewed goals with patient and educated him on what happens to the brain after a stroke as it relates to communication. Patient verbalized understanding.    Home program established:  not yet established  Exercises were reviewed and Peña was able to demonstrate them prior to the end of the session.  Peña demonstrated good  understanding of the education provided.     See Electronic  Medical Record under Patient Instructions for exercises provided throughout therapy.  Assessment:   Peña is progressing well towards his goals. Patient was pleasant.  Patient performed well in answering yes/no questions but continues to demonstrate difficulties in following multiunit commands. Note: patient needs additional time to process instructions when they are given. Patient performed well in/r/ bi-syllabic words but does show some difficulties with multi-syllabic /r/ words. Patient continues to demonstrate more difficulties with multi-syllabic in comparison to bi-syllabic words. Patient voices that when going out to fast food restaurants he is able to independently order his food, and there are no difficulties when ordering at a restaurant. Throughout entire session patient demonstrated no signs of frustration and appeared to be more at ease. SLP to update Patients POC and extend for 4 weeks. Current goals remain appropriate. Goals to be updated as necessary.     Patient prognosis is Good. Patient will continue to benefit from skilled outpatient speech and language therapy to address the deficits listed in the problem list on initial evaluation, provide patient/family education and to maximize patient's level of independence in the home and community environment.   Medical necessity is demonstrated by the following IMPAIRMENTS:  Deficits in executive functioning and memory prevent the patient performing his work and personal daily activities effectively and efficiently which may put him at risk of unsafe behavior and a decline in quality of life.  Barriers to Therapy: transportation  Patient's spiritual, cultural and educational needs considered and patient agreeable to plan of care and goals.  Plan:   Continue Plan of Care with focus on communication deficits. Extend POC for 4 weeks.    JOSESITO Bonner-SLP   9/27/2022

## 2022-09-26 NOTE — PROGRESS NOTES
[facility] Occupational Therapy  Initial Neurological Evaluation     Date: 9/27/2022  Patient: Adarsh Wheeler  Chart Number: [id number]    Therapy Diagnosis:   Encounter Diagnoses   Name Primary?    Right sided weakness Yes    Muscle weakness     Lack of coordination      Physician: Stefan Woods*    Physician Orders: OT eval and treat   Medical Diagnosis: R53.1 (ICD-10-CM) - Right sided weakness I63.9 (ICD-10-CM) - Left-sided cerebrovascular accident (CVA)   Evaluation Date: 9/27/2022  Plan of Care Expiration Period: ***/***/2022  Insurance Authorization period Expiration: 8/12/2023  Visit # / Visits Authorized: 1 / 1  FOTO: 9/27/2022    Time In:2:00  Time Out: 2:45  Total Billable (one on one) Time: 45 minutes    Precautions: Standard and Fall    Subjective     History of Current Condition: Per medical record on December 8th, he woke up and the right side of his body was weak. He went to the hospital and was diagnosed with a CVA. He received inpatient rehab at [facility] for about a month [facility]. On 8/17/2022, he underwent a left carotid endarterectomy and had re-exploration for bleeding after leg lifting exercises on 8/25/2022. He states that he continues to experience weakness in his right upper extremity but has been compliant with home exercise program    Date of Onset: 12/2021  Surgical Procedure: Left carotid endarterectomy 08/17/2022  Imaging: {Mri/ctscan/bone scan:00995} *** (reviewed/not reviewed)  Previous Therapy: ***     Right Left   Involved Side   [x]     []   Dominant Side    [x]     [x]       Pain:  Pain Related Behaviors Observed: no   Patient reports no pain, numbness on right side. States he doesn't feel the pain    Occupation:   Working presently: retired     Functional Limitations/Social History:    Prior Level of Function: independent   Current Level of Function: modified independence     Home/Living  environment : lives alone  Home Access: SSH , 0 steps to enter,   DME: wheelchair but does not use at this time      Leisure: carpentry, scuba diving, running    Driving: currently driving, drove to evaluation    Functional Status      Functional Mobility:  Bed mobility: {AMB OT NEURO ASSISTANCE:54362}  Roll to left: {AMB OT NEURO ASSISTANCE:47863}  Roll to right: {AMB OT NEURO ASSISTANCE:76938}  Supine to sit: {AMB OT NEURO ASSISTANCE:24697}  Sit to supine: {AMB OT NEURO ASSISTANCE:04364}  Transfers to bed: {AMB OT NEURO ASSISTANCE:39150}  Transfers to toilet: {AMB OT NEURO ASSISTANCE:64872}  Car transfers: {AMB OT NEURO ASSISTANCE:68444}  Wheelchair mobility: {AMB OT NEURO ASSISTANCE:29216}    ADL's:  Feeding: Mod I  Grooming: Mod I  Hygiene: Mod I  UB Dressing: Mod I  LB Dressing: Mod I  Toileting: Mod I  Bathing: Mod I    IADL's:  Homecare: Mod I  Cooking: Mod I  Laundry: Mod I  Yard work: mod I to mod A; states he cut he cut his grass 2x since the stroke   Use of telephone: I  Money management: Mod I  Medication management: Mod I; sometimes has difficulty opening the containers   Handwriting:Min A; states it is mediocre   Technology Use:I    Patient's Goals for Therapy: most importantly, I want to work on my right arm and get it back in shape.    Past Medical History/Physical Systems Review:     Past Medical History:  Adarsh Wheeler  has no past medical history on file.    Past Surgical History:  Adarsh Wheeler  has a past surgical history that includes Eye surgery; Foot surgery; Hand surgery; Carotid endarterectomy (Left, 8/17/2022); Re-exploration for bleeding (Left, 8/25/2022); and Evacuation of hematoma (Left, 8/25/2022).    Current Medications:  Adarsh EGAN III has a current medication list which includes the following prescription(s): acetaminophen, aspirin, duloxetine, lactulose, lisinopril, melatonin, ondansetron, oxycodone, polyethylene glycol, rosuvastatin, sertraline, and  "trazodone.    Allergies:  Review of patient's allergies indicates:   Allergen Reactions    Mirtazapine Rash        Objective     Cognitive Exam:  Oriented Person, Place, Time, and Situation   Behaviors normal, cooperative   Follows Commands/attention: Follows multistep  commands   Communication dysarthria   Memory No Deficits noted   Safety awareness/insight to disability {ONC PSO AWARENESS:04600}   Coping skills/emotional control {AMB OT NEURO COGNITIVE COPING SKILLS:87783}     Visual/Perceptual:  Acuity: uses readers  Comments: states his vision "fades in and out", stating blurriness when looking close up; 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     Physical Exam:  Postural examination/scapula alignment: {AMB OT NEURO POSTURAL EXAM:12967}  Joint integrity: {AMB OT NEURO JOINT INTEGRITY:12416}  Skin integrity: {AMB OT NEURO SKIN INTEGRITY:24676}  Edema: {AMB OT NEURO EDEMA:37172}   Palpation: ***      Joint Evaluation  AROM  9/27/2022 PROM   9/27/2022 MMS   9/27/2022 AROM  9/27/2022 PROM   9/27/2022 MMS   9/27/2022    Right Right Right Left Left Left   Scapular Elevation   5/5 WNL  5/5   Scapular Retraction   5/5 WNL  5/5   Shoulder Flex 0-180 109  3-/5   5/5   Shoulder Extension 0-80 59  3-/5   5/5   Shoulder Abd 0-180 125  3-/5   5/5   Shoulder ER 0-90 56  3-/5   5/5   Shoulder IR 0-90 S2  3-/5   5/5   Shoulder Horizontal adduction 0-90 35  3-/5   5/5   Elbow Flex/Ext 0-150 130/0  3/5   5/5   Wrist Flex 0-80 65  3-/5   5/5   Wrist Ext 0-70 50  3-/5   5/5   Supination 0-80 WNL  3/5   5/5   Pronation 0-80 WNL  3/5   5/5   Ulnar Deviation   3/5   5/5   Radial Deviation    3/5   5/5   *WNL - Within Normal Limits  *NT = Not Tested    Digit  Flexion  AROM  9/27/2022 Flexion  PROM  9/27/2022 Ext Lag  AROM  9/27/2022 Ext Lag   PROM  9/27/2022    Distance from tip to palm measured in cm Distance from tip to palm measured in cm "     IF       MF       RF       LF       *WNL - Within Normal Limits  *NT = Not Tested    Thumb AROM   9/27/2022 PROM  9/27/2022   IP     MP     Kapandji          *WNL - Within Normal Limits  *NT = Not Tested    Fist: normal     Strength: (ADRIANNA Dynamometer in lbs.) Average 3 trials, Position II:     9/27/2022 9/27/2022   Rung II Right Left   Trial 1 34# 85#   Trial 2 39# 91#   Trial 3 40# 101#   Average 37.6# 92.3#     Normal  Average Values  Female Right Left Male: Right Left   20-29 66 lbs 62 lbs         94 lbs 86 lbs   30-39 68 lbs 64 lbs 90 lbs 82 lbs   40-49 64 lbs 62 lbs 80 lbs 74 lbs   50-59 62 lbs 57 lbs 72 lbs 65 lbs   60-69 53 lbs 51 lbs 63 lbs 56 lbs   70+ 44 lbs 42 lbs 54 lbs 48 lbs   SD = +/- 19lbs       Pinch Strength (Measured in lbs)     9/27/2022 9/27/2022    Right Left   Key Pinch 15 # 33.5 #   3pt Pinch 13 # 24 #   2pt Pinch 16 # 23 #       Fine/Gross Motor Coordination    Assessment  Right   9/27/2022 Left  9/27/2022   9 hole peg test 9 pegs in/out for time 1:30 0:34   *WNL - Within Normal Limits  *NT = Not Tested    Tone:  Modified Terra Scale:   1-  Slight increase in muscle tone, manifested by a catch and release or by minimal resistance at the end of the range of motion when the affected part(s) is moved in flexion or extension    Sensation:  Adarsh EGAN III  reports ***     Sensation Intact  Impaired Comments    Holland Debra  Deep Touch (6.65) [] []     Protective Sensation (4.56) [] []     Light Touch (3.61) [] []           Other Kinesthesia  [] []     Temperature [] []     Stereognosis  [] []      Balance:   Static Sit - NORMAL: No deviations seen in posture held statically  Dynamic sit- NORMAL: No deviations seen in posture held statically  Static Stand - FAIR+: Able to take MINIMAL challenges from all directions  Dynamic stand - FAIR+: Able to take MINIMAL challenges from all directions    Endurance Deficit: {severity:00737}             CMS Impairment/Limitation/Restriction for  "FOTO *** Survey    Therapist reviewed FOTO scores for Adarsh Wheeler on 9/27/2022.   FOTO documents entered into [employer] - see Media section.    Limitation Score: ***%  Category: {Blank single:79158::"Other","Self Care","Body Position","Carrying","Mobility"}         Treatment     Treatment Time In: ***  Treatment Time Out: ***  Total Treatment time separate from Evaluation time:***    Peña participated in dynamic functional therapeutic activities to improve functional performance for ***  minutes, including:  ***    Peña participated in neuromuscular re-education activities to improve: {AMB PT PROGRESS NEURO RE-ED:04812} for *** minutes. The following activities were included:  ***    Peña received therapeutic exercises to develop {AMB PT PROGRESS OBJECTIVE:28587} for *** minutes including:  ***    Peña received the following manual therapy techniques: {AMB PT PROGRESS MANUAL THERAPY:83391} were applied to the: *** for *** minutes, including:  ***    Peña received the following supervised modalities after being cleared for contradictions: {AMB PT SUPERVISED MODES:57706}  ***    Peña received the following direct contact modalities after being cleared for contraindications: {AMB PT PROGRESS DIRECT CONTACT MODES:52793}  ***    Home Exercises and Patient Education Provided    Education provided:   -role of OT, goals for OT, scheduling/cancellations, insurance limitations with patient.  -Additional Education provided: ***    Written Home Exercises Provided: {Blank single:22716::"yes","Patient instructed to cont prior HEP"}.  Exercises were reviewed and Peña was able to demonstrate them prior to the end of the session.    Peña demonstrated {Desc; good/fair/poor:23279} understanding of the education provided.     See EMR under {Blank single:85932::"Media","Patient Instructions"} for exercises provided {Blank single:12277::"9/27/2022","prior visit"}.    Assessment     Adarsh Wheeler is a 57 y.o. male referred to " outpatient occupational therapy and presents with a medical diagnosis of ***, resulting in {AMB OT NEURO IMPAIRMENTS:60382} and demonstrates limitations as described in the chart below. Following medical record review it is determined that patient will benefit from occupational therapy services in order to maximize pain free and/or functional use of {LEFT/RIGHT:27237} ***.    Patient prognosis is {REHAB PROGNOSIS OHS:35778} due to  ***  Patient will benefit from skilled outpatient Occupational Therapy to address the deficits stated above and in the chart below, provide patient/family education, and to maximize patient's level of independence.     Plan of care discussed with patient: {YES:81667}  Patient's spiritual, cultural and educational needs considered and patient is agreeable to the plan of care and goals as stated below:     Anticipated Barriers for therapy: *** (co-morbidities, transportation, etc)    Medical Necessity is demonstrated by the following  Profile and History Assessment of Occupational Performance Level of Clinical Decision Making Complexity Score   Occupational Profile:   Adarsh Wheeler is a 57 y.o. male who {LIVES WITH:31478} and is currently {Work history:32473} as ***. Adarsh Wheeler has difficulty with  {ADLs:67797}  {IADLs:16345}  affecting his/her daily functional abilities. His/her main goal for therapy is ***.     Comorbidities:   {Co-morbidities:26034}    Medical and Therapy History Review:   {History Review:54473}               Performance Deficits    Physical:  {Physical:22112}    Cognitive:  {Cognitive:87910}    Psychosocial:    {Psychosocial:38548}     Clinical Decision Making:  {Desc; low/moderate/high:389392}    Assessment Process:  {Assessment Complexity:30497}    Modification/Need for Assistance:  {Modification:88199}    Intervention Selection:  {Treatment Options:74385}       {Desc; low/moderate/high:887830}  Based on PMHX, co morbidities , data from assessments  "and functional level of assistance required with task and clinical presentation directly impacting function.       The following goals were discussed with the patient and patient is in agreement with them as to be addressed in the treatment plan.     Goals:   Short Term Goals: *** weeks  - Pt will be independent with Home Exercise Program (HEP)/Home Activity Program (HAP) to promote long term maintenance of progress made in therapy.     Long Term Goals: *** weeks      Plan   Certification Period/Plan of care expiration: 9/27/2022 to 12/7/2022.    Outpatient Occupational Therapy {NUMBERS 1-5:68513} times weekly for {0-10:24726::"0"} weeks to include the following interventions: {TX PLAN:93002}.    Corinne Rapier, OT      I certify the need for these services furnished under this plan of treatment and while under my care.  ____________________________________ Physician/Referring Practitioner   Date of Signature      "

## 2022-09-27 ENCOUNTER — CLINICAL SUPPORT (OUTPATIENT)
Dept: REHABILITATION | Facility: HOSPITAL | Age: 57
End: 2022-09-27
Payer: OTHER GOVERNMENT

## 2022-09-27 DIAGNOSIS — M62.81 MUSCLE WEAKNESS: ICD-10-CM

## 2022-09-27 DIAGNOSIS — R53.1 RIGHT SIDED WEAKNESS: Primary | ICD-10-CM

## 2022-09-27 DIAGNOSIS — R47.01 BROCA'S APHASIA: Primary | ICD-10-CM

## 2022-09-27 DIAGNOSIS — R48.2 APRAXIA OF SPEECH: ICD-10-CM

## 2022-09-27 DIAGNOSIS — R27.9 LACK OF COORDINATION: ICD-10-CM

## 2022-09-27 PROCEDURE — 97166 OT EVAL MOD COMPLEX 45 MIN: CPT | Mod: PN

## 2022-09-27 PROCEDURE — 92507 TX SP LANG VOICE COMM INDIV: CPT | Mod: PN

## 2022-09-28 ENCOUNTER — DOCUMENTATION ONLY (OUTPATIENT)
Dept: REHABILITATION | Facility: HOSPITAL | Age: 57
End: 2022-09-28
Payer: OTHER GOVERNMENT

## 2022-09-28 DIAGNOSIS — Z98.890 S/P CAROTID ENDARTERECTOMY: Primary | ICD-10-CM

## 2022-09-28 DIAGNOSIS — I63.9 LEFT-SIDED CEREBROVASCULAR ACCIDENT (CVA): ICD-10-CM

## 2022-09-28 PROBLEM — M62.81 MUSCLE WEAKNESS: Status: ACTIVE | Noted: 2022-09-28

## 2022-09-28 PROBLEM — R27.9 LACK OF COORDINATION: Status: ACTIVE | Noted: 2022-09-28

## 2022-09-28 NOTE — PLAN OF CARE
[facility]  Speech Therapy Updated Plan of Care-Neurological Rehabilitation         Date: 9/27/2022   Name: Adarsh Wheeler  Clinic Number: [id number]    Therapy Diagnosis:   Encounter Diagnoses   Name Primary?    Broca's aphasia Yes    Apraxia of speech      Physician: Kush Serrano MD    Physician Orders: [id number] - AMB REFERRAL/CONSULT TO SPEECH THERAPY  Medical Diagnosis: I63.9 (ICD-10-CM) - Cerebral infarction, unspecified    Visit #/ Visits Authorized:  2 /15   Evaluation Date: 7/18/2022  Insurance Authorization Period: 6/14/2022 - 12/11/2022  Plan of Care Expiration:     New POC Certification Period:  9/30/2022- 10/28/2022    Total Visits Received: 3    Precautions:Standard     Subjective     Update: Patient voices that when going out to fast food restaurants he is able to independently order his food, and there are no difficulties when ordering at a restaurant. Throughout entire session patient demonstrated no signs of frustration and appeared to be more at ease.    Objective     Update: see follow up note dated 9/27/2022    Assessment     Update: Adarsh Wheeler presents to [facility] status post medical diagnosis of Cerebral infarction, unspecified. Demonstrates impairments including limitations as described in the problem list. Positive prognostic factors include patients motivation to improve communication. Negative prognostic factors include complex medical hisory. He presents with moderate Broca's aphasia and apraxia of speech characterized by deficits across all domains of language and  blocks with articulatory groping and inconsistent consonant and vowel distortions.  Barriers to therapy include transportation . Patient will benefit from skilled, outpatient rehabilitation speech therapy.    Rehab Potential: good   Pt's spiritual, cultural, and educational needs considered and patient agreeable to plan of care and goals.    Education: Plan of Care, role of  SLP in care, scheduling/ cancellation policy, and insurance limitations / visit limit      Previous Short Term Goals Status: 4 weeks  1. Pt will complete VNeST for 4 verbs per session given minimal cues.   2. Pt will follow attentive reading and constraint summarization with moderate cues for story retell with given constraints.  3. Pt will follow complex multiunit commands with 90% accuracy independently.   4. Pt will answer complex multiunit yes/no questions with 90% accuracy independently.   5. Pt will complete verbal reasoning tasks with minimal cues for accuracy.   6. Pt will produce bisyllabic syllabic words with word initial /s/ with 85% acc across 3 probe trials.   7. Pt will produce bisyllabic syllabic words with word initial /r/ with 85% acc across 3 probe trials.      New Short Term Goals:   Continue with previous short term goals     Long Term Goal Status:  8 weeks  1. He  will develop functional cognitive-linguistic based skills and utilize compensatory strategies to communicate wants and needs effectively to different conversational partners, maintain safety, and participate socially in functional living environment.    2. Pt will develop functional motor programming, articulatory proficiency and utilize compensatory strategies to express wants and needs for intelligible speech and functional prosody in the functional living environment.  3. Pt will comprehend communication related to basic medical and social needs and utilize compensatory strategies to maintain safety and to participate socially in functional living environment    Goals Previously Met:  none     Reasons for Recertification of Therapy: Patient continues to demonstrate deficits in  his ability to effetely communicate    Plan     Updated Certification Period: 9/27/2022 to 10/28/2022    Recommended Treatment Plan: Patient will participate in the [facility] rehabilitation program for speech therapy 1 times per week to address his  Communication deficits, to educate patient and their family, and to participate in a home exercise program.     Other recommendations: No other recommendations at this time     Therapist's Name:  Trish Ann CF-SLP   9/27/2022      I CERTIFY THE NEED FOR THESE SERVICES FURNISHED UNDER THIS PLAN OF TREATMENT AND WHILE UNDER MY CARE      Physician Name: _______________________________    Physician Signature: ____________________________

## 2022-09-29 NOTE — PLAN OF CARE
[facility] Occupational Therapy  Initial Neurological Evaluation     Date: 9/27/2022  Patient: Adarsh Wheeler  Chart Number: [id number]    Therapy Diagnosis:   Encounter Diagnoses   Name Primary?    Right sided weakness Yes    Muscle weakness     Lack of coordination      Physician: Stefan Woods*    Physician Orders: OT eval and treat   Medical Diagnosis: R53.1 (ICD-10-CM) - Right sided weakness I63.9 (ICD-10-CM) - Left-sided cerebrovascular accident (CVA)   Evaluation Date: 9/27/2022  Plan of Care Expiration Period: 12/9/2022  Insurance Authorization period Expiration: 8/12/2023  Visit # / Visits Authorized: 1 / 1  FOTO: 9/27/2022    Time In:2:00  Time Out: 2:45  Total Billable (one on one) Time: 45 minutes    Precautions: Standard and Fall    Subjective     History of Current Condition: Per medical record on December 8th, he woke up and the right side of his body was weak. He went to the hospital and was diagnosed with a CVA. He received inpatient rehab at [place] [facility] for about a month before [facility]. On 8/17/2022, he underwent a left carotid endarterectomy and had re-exploration for bleeding after leg lifting exercises on 8/25/2022. He states that he continues to experience weakness in his right upper extremity but has been compliant with home exercise program    Date of Onset: 12/2021  Surgical Procedure: Left carotid endarterectomy 08/17/2022  Imaging: CT scan films:  8/7/2022: Impression:  No acute intracranial process.  Chronic left MCA infarct involving the basal ganglia and left frontal operculum.  Critical stenosis at the left carotid bifurcation with adaptive narrowing of the more distal cervical ICA however the vessel is patent.  65-70% stenosis at the right carotid bifurcation by NASCET criteria.  High-grade stenosis of the V4 segment of the left vertebral artery.  Moderately stenosed V4 segment of the right vertebral  "artery.  No major branch stenosis/occlusion at the [id number] otherwise identified.(reviewed/not reviewed)    8/25/2022: Impression:  1. Interval left carotid endarterectomy with at least 4 x 3.7 x 8.1 cm hematoma in the left sternocleidomastoid muscle without definite active extravasation.  2. Severe high-grade left and mild-moderate right stenosis in the mid V4 vertebral arteries.  3. 60-70% stenosis near the origin of the right ICA.  Previous Therapy: [facility]     Right Left   Involved Side   [x]     []   Dominant Side    [x]     [x]       Pain:  Pain Related Behaviors Observed: no   Patient reports no pain, numbness on right side. States he doesn't feel the pain    Occupation:   Working presently: retired     Functional Limitations/Social History:    Prior Level of Function: independent   Current Level of Function: modified independence     Home/Living environment : lives alone  Home Access: SSH , 0 steps to enter,   DME: wheelchair but does not use at this time      Leisure: carpentry, scuba diving, running    Driving: currently driving, drove to evaluation    Functional Status      Functional Mobility:  Bed mobility: Mod I  Roll to left: Mod I  Roll to right: Mod I  Supine to sit: Mod I  Sit to supine: Mod I  Transfers to bed: Mod I  Transfers to toilet: Mod I  Car transfers: Mod I  Wheelchair mobility: Mod I    ADL's:  Feeding: Mod I  Grooming: Mod I  Hygiene: Mod I  UB Dressing: Mod I  LB Dressing: Mod I  Toileting: Mod I  Bathing: Mod I    IADL's:  Homecare: Mod I  Cooking: Mod I  Laundry: Mod I  Yard work: mod I to mod A; states he cut he cut his grass 2x since the stroke   Use of telephone: I  Money management: Mod I  Medication management: Mod I; sometimes has difficulty opening the containers   Handwriting:Min A; states it is "mediocre"   Technology Use:I    Patient's Goals for Therapy: "most importantly, I want to work on my right arm and get it back " "in shape".    Past Medical History/Physical Systems Review:     Past Medical History:  Adarsh Wheeler  has no past medical history on file.    Past Surgical History:  Adarsh Wheeler  has a past surgical history that includes Eye surgery; Foot surgery; Hand surgery; Carotid endarterectomy (Left, 8/17/2022); Re-exploration for bleeding (Left, 8/25/2022); and Evacuation of hematoma (Left, 8/25/2022).    Current Medications:  Adarsh EGAN III has a current medication list which includes the following prescription(s): acetaminophen, aspirin, duloxetine, lactulose, lisinopril, melatonin, ondansetron, oxycodone, polyethylene glycol, rosuvastatin, sertraline, and trazodone.    Allergies:  Review of patient's allergies indicates:   Allergen Reactions    Mirtazapine Rash        Objective     Cognitive Exam:  Oriented Person, Place, Time, and Situation   Behaviors normal, cooperative   Follows Commands/attention: Follows multistep  commands   Communication dysarthria   Memory No Deficits noted   Safety awareness/insight to disability aware of diagnosis, treatment, and prognosis   Coping skills/emotional control Appropriate to situation     Visual/Perceptual:  Acuity: uses readers  Comments: states his vision "fades in and out", stating blurriness when looking close up; reports eye surgery in 2003. He has not followed up with optometry    Physical Exam:  Postural examination/scapula alignment: Affected scapula upwardly rotated  Joint integrity: Firm end feeling  Skin integrity: intact   Edema: none present and pt does not report edema in upper extremities    Palpation: no pain with palpation       Joint Evaluation  AROM  9/27/2022 PROM   9/27/2022 MMS   9/27/2022 AROM  9/27/2022 PROM   9/27/2022 MMS   9/27/2022    Right Right Right Left Left Left   Scapular Elevation   5/5 WNL  5/5   Scapular Retraction   5/5 WNL  5/5   Shoulder Flex 0-180 109  3-/5   5/5   Shoulder Extension 0-80 59  3-/5   5/5   Shoulder Abd 0-180 " 125  3-/5   5/5   Shoulder ER 0-90 56  3-/5   5/5   Shoulder IR 0-90 S2  3-/5   5/5   Shoulder Horizontal adduction 0-90 35  3-/5   5/5   Elbow Flex/Ext 0-150 130/0  3/5   5/5   Wrist Flex 0-80 65  3-/5   5/5   Wrist Ext 0-70 50  3-/5   5/5   Supination 0-80 WNL  3/5   5/5   Pronation 0-80 WNL  3/5   5/5   Ulnar Deviation   3/5   5/5   Radial Deviation    3/5   5/5   *WNL - Within Normal Limits  *NT = Not Tested    Fist: normal     Strength: (ADRIANNA Dynamometer in lbs.) Average 3 trials, Position II:     9/27/2022 9/27/2022   Rung II Right Left   Trial 1 34# 85#   Trial 2 39# 91#   Trial 3 40# 101#   Average 37.6# 92.3#     Normal  Average Values  Female Right Left Male: Right Left   20-29 66 lbs 62 lbs         94 lbs 86 lbs   30-39 68 lbs 64 lbs 90 lbs 82 lbs   40-49 64 lbs 62 lbs 80 lbs 74 lbs   50-59 62 lbs 57 lbs 72 lbs 65 lbs   60-69 53 lbs 51 lbs 63 lbs 56 lbs   70+ 44 lbs 42 lbs 54 lbs 48 lbs   SD = +/- 19lbs       Pinch Strength (Measured in lbs)     9/27/2022 9/27/2022    Right Left   Key Pinch 15 # 33.5 #   3pt Pinch 13 # 24 #   2pt Pinch 16 # 23 #       Fine/Gross Motor Coordination    Assessment  Right   9/27/2022 Left  9/27/2022   9 hole peg test 9 pegs in/out for time 1:30 0:34   *WNL - Within Normal Limits  *NT = Not Tested    Tone:  Modified Terra Scale:   1-  Slight increase in muscle tone, manifested by a catch and release or by minimal resistance at the end of the range of motion when the affected part(s) is moved in flexion or extension    Sensation:  Adarsh EGAN III  reports numbness on right side, due to time constraints, will assess first after      Sensation Intact  Impaired Comments    Divide Debra  Deep Touch (6.65) [] []     Protective Sensation (4.56) [] []     Light Touch (3.61) [] []           Other Kinesthesia  [x] []     Temperature [x] []     Stereognosis  [] []      Balance:   Static Sit - NORMAL: No deviations seen in posture held statically  Dynamic sit- NORMAL: No  deviations seen in posture held statically  Static Stand - FAIR+: Able to take MINIMAL challenges from all directions  Dynamic stand - FAIR+: Able to take MINIMAL challenges from all directions    Endurance Deficit: moderate             CMS Impairment/Limitation/Restriction for FOTO Survey    Therapist reviewed FOTO scores for Adarsh Wheeler on 9/27/2022.   FOTO documents entered into [employer] - see Media section.    Limitation Score: 13%  Category: Self Care         Treatment     Home Exercises and Patient Education Provided    Education provided:   -role of OT, goals for OT, scheduling/cancellations, insurance limitations with patient.    Written Home Exercises Provided: Patient instructed to cont prior HEP.  Exercises were reviewed and Peña was able to demonstrate them prior to the end of the session.    Peña demonstrated good  understanding of the education provided.     Assessment     Adarsh Wheeler is a 57 y.o. male referred to outpatient occupational therapy and presents with a medical diagnosis of  R53.1 (ICD-10-CM) - Right sided weakness I63.9 (ICD-10-CM) - Left-sided cerebrovascular accident (CVA) , resulting in decreased range of motion, decreased muscle strength, and impaired function and demonstrates limitations as described in the chart below. Adarsh exhibited significant active range of motion, manual muscle strength and fine motor coordination impairments following CVA, resulting in difficulty performing daily tasks and leisure activities. His  and pinch strength averages were below normal for his age/gender and compared to unaffected left side. In addition, he reports impaired sensation on right side of his body, increasing risk of injury. Peña is highly motivated to participate and reports compliance with exercise program to date. Following medical record review it is determined that patient will benefit from occupational therapy services in order to maximize pain free and/or  functional use of right upper extremity.    Patient prognosis is Excellent due to  high motivation to participate with therapy, ability to follow skilled instruction, excellent family support   Patient will benefit from skilled outpatient Occupational Therapy to address the deficits stated above and in the chart below, provide patient/family education, and to maximize patient's level of independence.     Plan of care discussed with patient: Yes  Patient's spiritual, cultural and educational needs considered and patient is agreeable to the plan of care and goals as stated below:     Anticipated Barriers for therapy: none     Medical Necessity is demonstrated by the following  Profile and History Assessment of Occupational Performance Level of Clinical Decision Making Complexity Score   Occupational Profile:   Adarsh Wheeler is a 57 y.o. male who lives alone and is currently retired. Adarsh Wheeler has difficulty with  dressing  phone/computer use, housework/household chores, and medication management  affecting his/her daily functional abilities. His main goal for therapy is to improve use of right upper extremity and return to active lifestyle.     Comorbidities:   young age    Medical and Therapy History Review:   Expanded               Performance Deficits    Physical:  Joint Mobility  Muscle Power/Strength  Muscle Endurance  Control of Voluntary Movement   Strength  Pinch Strength  Gross Motor Coordination  Fine Motor Coordination  Proprioception Functions    Cognitive:  Communication    Psychosocial:    Habits  Routines  Rituals     Clinical Decision Making:  moderate    Assessment Process:  Detailed Assessments    Modification/Need for Assistance:  Not Necessary    Intervention Selection:  Several Treatment Options       moderate  Based on PMHX, co morbidities , data from assessments and functional level of assistance required with task and clinical presentation directly impacting function.        The following goals were discussed with the patient and patient is in agreement with them as to be addressed in the treatment plan.     Goals:   Short Term Goals: 5 weeks  - Pt will be independent with Home Exercise Program (HEP)/Home Activity Program (HAP) to promote long term maintenance of progress made in therapy.   - Pt will demonstrate right shoulder flexion/abduction to 140* or better in order to improve participation with reaching tasks   - Pt will verbalize understanding of sensory strategies to improve input to right upper extremity and increase safety with daily tasks.   - Pt will increase right  strength to 45# or better in order to increase ease with housekeeping and cooking tasks.   - Pt will improve right key/3pt/2pt pinch strength to 20#/15#/17# in order to improve ease with buttons, zippers, opening containers, etc     Long Term Goals: 10 weeks  - Pt will improve FOTO score by 5 points or better in order to demonstrate improved use of right upper extremity with daily tasks   - Pt will improve right manual muscle strength to 4/5 or better in all planes in order to return to prior level of function  - Pt will increase right  strength to 60# or better in order to increase ease with housekeeping and cooking tasks.   - Pt will improve right key/3pt/2pt pinch strength to 25#/18#/18# in order to improve ease with buttons, zippers, opening containers, etc   - Pt will complete 9 hole peg assessment in 1 minute or less using right upper extremity in order to improve dexterity and gross motor coordination during dressing and leisure tasks  - Pt will manage various sized containers with increased ease using bilateral upper extremities and adaptive equipment as needed in order to improve independence with cooking tasks.  - Pt will use right upper extremity to place 5 objects of various weight on overhead shelf in order to improve safety and ease with household chores   - Pt will report increased  participation and independence with daily tasks using right upper extremity     Plan   Certification Period/Plan of care expiration: 9/27/2022 to 12/7/2022.    Outpatient Occupational Therapy 1 times weekly for 10 weeks to include the following interventions: Electrical Stimulation NMES/TENs, Fluidotherapy, Manual Therapy, Moist Heat/ Ice, Neuromuscular Re-ed, Orthotic Management and Training, Paraffin, Patient Education, Self Care, Therapeutic Activities, and Therapeutic Exercise.    Corinne Rapier, OT    I certify the need for these services furnished under this plan of treatment and while under my care.  ____________________________________ Physician/Referring Practitioner   Date of Signature

## 2022-10-04 ENCOUNTER — PATIENT MESSAGE (OUTPATIENT)
Dept: INTERNAL MEDICINE | Facility: CLINIC | Age: 57
End: 2022-10-04
Payer: OTHER GOVERNMENT

## 2022-10-05 ENCOUNTER — CLINICAL SUPPORT (OUTPATIENT)
Dept: REHABILITATION | Facility: HOSPITAL | Age: 57
End: 2022-10-05
Payer: OTHER GOVERNMENT

## 2022-10-05 ENCOUNTER — PATIENT MESSAGE (OUTPATIENT)
Dept: VASCULAR SURGERY | Facility: CLINIC | Age: 57
End: 2022-10-05
Payer: OTHER GOVERNMENT

## 2022-10-05 DIAGNOSIS — R47.01 BROCA'S APHASIA: Primary | ICD-10-CM

## 2022-10-05 DIAGNOSIS — R48.2 APRAXIA OF SPEECH: ICD-10-CM

## 2022-10-05 DIAGNOSIS — M62.81 MUSCLE WEAKNESS: Primary | ICD-10-CM

## 2022-10-05 DIAGNOSIS — R27.9 LACK OF COORDINATION: ICD-10-CM

## 2022-10-05 PROCEDURE — 97112 NEUROMUSCULAR REEDUCATION: CPT | Mod: PN

## 2022-10-05 PROCEDURE — 92507 TX SP LANG VOICE COMM INDIV: CPT | Mod: PN

## 2022-10-05 NOTE — PROGRESS NOTES
"Occupational Therapy Daily Treatment Note     Name: Adarsh Wheeler  [facility] Number: [id number]    Therapy Diagnosis:   Encounter Diagnoses   Name Primary?    Muscle weakness Yes    Lack of coordination      Physician: Stfean Woods*    Visit Date: 10/5/2022    Physician Orders: OT eval and treat   Medical Diagnosis: R53.1 (ICD-10-CM) - Right sided weakness I63.9 (ICD-10-CM) - Left-sided cerebrovascular accident (CVA)   Evaluation Date: 9/27/2022  Plan of Care Expiration Period: 12/9/2022  Insurance Authorization period Expiration: 8/12/2023  Visit # / Visits Authorized: 1 / 20  FOTO: 9/27/2022     Time In:2:45  Time Out: 3:30  Total Billable (one on one) Time: 45 minutes     Precautions: Standard and Fall    Subjective     Pt reports: "I have pain in both arms because this one (left) is working overtime because of this one (right)"   he was compliant with home exercise program given last session.   Response to previous treatment:first since eval  Functional change: reports increased pain in bilateral upper extremities     Pain: 4/10; 6/10   Location: right arm; left arm    Objective     Physical Exam:  Postural examination/scapula alignment: Affected scapula upwardly rotated  Joint integrity: Firm end feeling  Skin integrity: intact   Edema: none present and pt does not report edema in upper extremities    Palpation: no pain with palpation      Joint Evaluation  AROM  9/27/2022 MMS   9/27/2022 AROM  9/27/2022 MMS   9/27/2022     Right Right Left Left   Scapular Elevation   5/5 WNL 5/5   Scapular Retraction   5/5 WNL 5/5   Shoulder Flex 0-180 109 3-/5   5/5   Shoulder Extension 0-80 59 3-/5   5/5   Shoulder Abd 0-180 125 3-/5   5/5   Shoulder ER 0-90 56 3-/5   5/5   Shoulder IR 0-90 S2 3-/5   5/5   Shoulder Horizontal adduction 0-90 35 3-/5   5/5   Elbow Flex/Ext 0-150 130/0 3/5   5/5   Wrist Flex 0-80 65 3-/5   5/5   Wrist Ext 0-70 50 3-/5   5/5   Supination 0-80 WNL 3/5   5/5   Pronation 0-80 WNL 3/5   " 5/5   Ulnar Deviation   3/5   5/5   Radial Deviation    3/5   5/5   *WNL - Within Normal Limits  *NT = Not Tested     Fist: normal      Strength: (ADRIANNA Dynamometer in lbs.) Average 3 trials, Position II:       9/27/2022 9/27/2022   Rung II Right Left   Trial 1 34# 85#   Trial 2 39# 91#   Trial 3 40# 101#   Average 37.6# 92.3#      Normal  Average Values  Female Right Left Male: Right Left   20-29 66 lbs 62 lbs         94 lbs 86 lbs   30-39 68 lbs 64 lbs 90 lbs 82 lbs   40-49 64 lbs 62 lbs 80 lbs 74 lbs   50-59 62 lbs 57 lbs 72 lbs 65 lbs   60-69 53 lbs 51 lbs 63 lbs 56 lbs   70+ 44 lbs 42 lbs 54 lbs 48 lbs   SD = +/- 19lbs         Pinch Strength (Measured in lbs)       9/27/2022 9/27/2022     Right Left   Key Pinch 15 # 33.5 #   3pt Pinch 13 # 24 #   2pt Pinch 16 # 23 #         Fine/Gross Motor Coordination     Assessment   Right   9/27/2022 Left  9/27/2022   9 hole peg test 9 pegs in/out for time 1:30 0:34   *WNL - Within Normal Limits  *NT = Not Tested     Tone:  Modified Terra Scale:   1-  Slight increase in muscle tone, manifested by a catch and release or by minimal resistance at the end of the range of motion when the affected part(s) is moved in flexion or extension     Sensation:  Adarsh JIGNESH BRITT  reports numbness on right side, due to time constraints, will assess first after        Sensation Intact  Impaired Comments    [place] Debra  Deep Touch (6.65) []  []       Protective Sensation (4.56) []  []       Light Touch (3.61) []  []                  Other Kinesthesia  [x]  []       Temperature [x]  []       Stereognosis  []  []         Balance:   Static Sit - NORMAL: No deviations seen in posture held statically  Dynamic sit- NORMAL: No deviations seen in posture held statically  Static Stand - FAIR+: Able to take MINIMAL challenges from all directions  Dynamic stand - FAIR+: Able to take MINIMAL challenges from all directions     Endurance Deficit: luis Pompa participated in neuromuscular  "re-education activities to improve: Coordination, Kinesthetic, Sense, Proprioception, and Posture for 45 minutes. The following activities were included:  -scapular elevation/retraction   - weightbearing on therapy ball with shoulder abduction   - active assisted range of motion scap protraction/retraction on therapy ball   - pectoralis stretch on 1/2 dowel supine 3x30"  - active assisted range of motion chest press/shoulder flexion non-weighted dowel 2x15  - external rotation x15 with dowel   - scap protraction/retraction x10   - triceps extension (supine) x10   - wall walks with therapy ball x8  - towel slides x5  - theraband shoulder extension (red) 2x10    Home Exercises and Education Provided     Education provided:   - postural and active assisted range of motion exercises   - Progress towards goals     Written Home Exercises Provided: yes.  Exercises were reviewed and Peña was able to demonstrate them prior to the end of the session.  Peña demonstrated good  understanding of the HEP provided.   .   See EMR under Patient Instructions for exercises provided 10/5/2022.        Assessment     Peña tolerated today's session well. Interventions focused on active range of motion, strength and coordination in right upper extremity to facilitate neuromuscular re-education, improve posture and provide proprioceptive input. Peña was able to complete all exercises with minimal stretching in right shoulder and cues for postural stability and biomechanics to maximize muscle contraction. Peña was able to implement strategies and noted increased stability in right shoulder with active assisted range of motion and active range of motion. Home exercise program initiated with Peña demonstrating good understanding of all movements. Noted fatigue as exercises progressed. Peña is highly motivated.     Peña is progressing well towards his goals and there are no updates to goals at this time. Pt prognosis is Good.     Pt will " continue to benefit from skilled outpatient occupational therapy to address the deficits listed in the problem list on initial evaluation provide pt/family education and to maximize pt's level of independence in the home and community environment.     Anticipated barriers to occupational therapy: none    Pt's spiritual, cultural and educational needs considered and pt agreeable to plan of care and goals.    Goals:   Short Term Goals: 5 weeks  - Pt will be independent with Home Exercise Program (HEP)/Home Activity Program (HAP) to promote long term maintenance of progress made in therapy. Not met, progressing  - Pt will demonstrate right shoulder flexion/abduction to 140* or better in order to improve participation with reaching tasks Not met, progressing  - Pt will verbalize understanding of sensory strategies to improve input to right upper extremity and increase safety with daily tasks. Not met, progressing  - Pt will increase right  strength to 45# or better in order to increase ease with housekeeping and cooking tasks. Not met, progressing  - Pt will improve right key/3pt/2pt pinch strength to 20#/15#/17# in order to improve ease with buttons, zippers, opening containers, etc Not met, progressing     Long Term Goals: 10 weeks  - Pt will improve FOTO score by 5 points or better in order to demonstrate improved use of right upper extremity with daily tasks Not met, progressing  - Pt will improve right manual muscle strength to 4/5 or better in all planes in order to return to prior level of function Not met, progressing  - Pt will increase right  strength to 60# or better in order to increase ease with housekeeping and cooking tasks. Not met, progressing  - Pt will improve right key/3pt/2pt pinch strength to 25#/18#/18# in order to improve ease with buttons, zippers, opening containers, etc  Not met, progressing  - Pt will complete 9 hole peg assessment in 1 minute or less using right upper extremity in  order to improve dexterity and gross motor coordination during dressing and leisure tasks Not met, progressing  - Pt will manage various sized containers with increased ease using bilateral upper extremities and adaptive equipment as needed in order to improve independence with cooking tasks. Not met, progressing  - Pt will use right upper extremity to place 5 objects of various weight on overhead shelf in order to improve safety and ease with household chores Not met, progressing  - Pt will report increased participation and independence with daily tasks using right upper extremity Not met, progressing    Plan     Updates/Grading for next session: continue with POC Corinne Rapier, OT

## 2022-10-05 NOTE — PROGRESS NOTES
"[facility]  Speech Therapy Treatment Note- Neurological Rehabilitation  Date: 10/5/2022     Name: Adarsh Wheeler   MRN: [id number]   Therapy Diagnosis:   Encounter Diagnoses   Name Primary?    Broca's aphasia Yes    Apraxia of speech      Physician: Kush Serrano MD  Physician Orders: [id number] - AMB REFERRAL/CONSULT TO SPEECH THERAPY  Medical Diagnosis: I63.9 (ICD-10-CM) - Cerebral infarction, unspecified    Visit #/ Visits Authorized: 4/ 15  Date of Evaluation:  7/18/2022   Insurance Authorization Period:  6/14/22- 12/11/22   Plan of Care Expiration Date:    9/12/22  Extended Plan of Care:  n/a   Progress Note: 10/19/2022   Visits Cancelled: 0  Visits No Show: 0    Time In:  1411 (patient was late and went to restroom)  Time Out:  1445  Total Billable Time: 34 minutes     Precautions: Standard  Subjective:   Patient reports: difficulty with communication specifically with new people  He was compliant to home exercise program.   Response to previous treatment: fair   Pain Scale:  0/10 on a Visual Analog Scale currently.   Pain Location: no pain indicated throughout session  Objective:           UNTIMED  Procedure Min.   Speech- Language- Voice Therapy  34   Total Timed Units: 0  Total Untimed Units: 1  Charges Billed/Number of units: 1    Short Term Goals: (4 weeks) Current Progress:   1. Pt will complete VNeST for 4 verbs per session given minimal cues.    Progressing/ Not Met 10/5/2022   Verb Network Strengthening Training completed with minimal to moderate cues for 1 verb given extended time. Patient with several repetitions of the same words and phrases for multiple answers, when prompted to increase variety- the patient presented with difficulty. The patient also had difficulty with generating multiple "who-verb-what" triads.      2. Pt will follow attentive reading and constraint summarization with moderate cues for story retell with given constraints.    Progressing/ Not Met 10/5/2022   " Not addressed in today's session.     3. Pt will follow complex multiunit commands with 90% accuracy independently.    Progressing/ Not Met 10/5/2022   Not addressed in today's session.        4. Pt will answer complex multiunit yes/no questions with 90% accuracy independently.      Progressing/ Not Met 10/5/2022   Patient was able to answer complex multiunit yes/no questions with 85% accuracy independently, 100% given minimal cues     Met x1   5. Pt will complete verbal reasoning tasks with minimal cues for accuracy.     Progressing/ Not Met 10/5/2022   Not addressed in today's session.        6. Pt will produce bisyllabic syllabic words with word initial /s/ with 85% acc across 3 probe trials.     Progressing/ Not Met 10/5/2022   94% on probe trials of multisyllabic words    Met x1   7. Pt will produce bisyllabic syllabic words with word initial /r/ with 85% acc across 3 probe trials.        Progressing/ Not Met 10/5/2022   95% on probe trials of multisyllabic words    Met x1     Patient Education/Response:   Patient educated regarding the followin. Role of Verb Network Strengthening Training in recovery and the research behind it    Patient verbalized understanding to all above education provided.     Home program established:  not yet established  Exercises were reviewed and Peña was able to demonstrate them prior to the end of the session.  Peña demonstrated good  understanding of the education provided.     See Electronic Medical Record under Patient Instructions for exercises provided throughout therapy.  Assessment:   Peña is progressing well towards his goals. Patient was pleasant. Patient noted informally to have difficulty with high level comprehension in conversation. Verb Network Strengthening Training was difficult for the patient and cues were required for increased variety and complexity. Patient with good question regarding the role of speech in his recovery.  Current goals remain appropriate.  Goals to be updated as necessary.     Patient prognosis is Good. Patient will continue to benefit from skilled outpatient speech and language therapy to address the deficits listed in the problem list on initial evaluation, provide patient/family education and to maximize patient's level of independence in the home and community environment.   Medical necessity is demonstrated by the following IMPAIRMENTS:  Deficits in executive functioning and memory prevent the patient performing his work and personal daily activities effectively and efficiently which may put him at risk of unsafe behavior and a decline in quality of life.  Barriers to Therapy: transportation  Patient's spiritual, cultural and educational needs considered and patient agreeable to plan of care and goals.  Plan:   Continue Plan of Care with focus on communication deficits. Extend POC for 4 weeks.    Ev Yoo CCC-SLP   10/5/2022

## 2022-10-12 ENCOUNTER — CLINICAL SUPPORT (OUTPATIENT)
Dept: REHABILITATION | Facility: HOSPITAL | Age: 57
End: 2022-10-12
Payer: OTHER GOVERNMENT

## 2022-10-12 ENCOUNTER — CLINICAL SUPPORT (OUTPATIENT)
Dept: REHABILITATION | Facility: HOSPITAL | Age: 57
End: 2022-10-12
Attending: STUDENT IN AN ORGANIZED HEALTH CARE EDUCATION/TRAINING PROGRAM
Payer: OTHER GOVERNMENT

## 2022-10-12 DIAGNOSIS — R48.2 APRAXIA OF SPEECH: ICD-10-CM

## 2022-10-12 DIAGNOSIS — M62.81 MUSCLE WEAKNESS: Primary | ICD-10-CM

## 2022-10-12 DIAGNOSIS — R26.9 GAIT ABNORMALITY: ICD-10-CM

## 2022-10-12 DIAGNOSIS — R47.01 BROCA'S APHASIA: Primary | ICD-10-CM

## 2022-10-12 DIAGNOSIS — Z98.890 S/P CAROTID ENDARTERECTOMY: ICD-10-CM

## 2022-10-12 DIAGNOSIS — R29.898 RIGHT LEG WEAKNESS: ICD-10-CM

## 2022-10-12 DIAGNOSIS — I63.9 LEFT-SIDED CEREBROVASCULAR ACCIDENT (CVA): ICD-10-CM

## 2022-10-12 DIAGNOSIS — R27.9 LACK OF COORDINATION: ICD-10-CM

## 2022-10-12 PROCEDURE — 97110 THERAPEUTIC EXERCISES: CPT | Mod: PN

## 2022-10-12 PROCEDURE — 97161 PT EVAL LOW COMPLEX 20 MIN: CPT | Mod: PN

## 2022-10-12 PROCEDURE — 97112 NEUROMUSCULAR REEDUCATION: CPT | Mod: PN,59

## 2022-10-12 PROCEDURE — 92507 TX SP LANG VOICE COMM INDIV: CPT | Mod: PN

## 2022-10-12 NOTE — PROGRESS NOTES
"Occupational Therapy Daily Treatment Note     Name: Adarsh Wheeler  [facility] Number: [id number]    Therapy Diagnosis:   Encounter Diagnoses   Name Primary?    Muscle weakness Yes    Lack of coordination        Physician: Stefan Woods*    Visit Date: 10/12/2022    Physician Orders: OT eval and treat   Medical Diagnosis: R53.1 (ICD-10-CM) - Right sided weakness I63.9 (ICD-10-CM) - Left-sided cerebrovascular accident (CVA)   Evaluation Date: 9/27/2022  Plan of Care Expiration Period: 12/9/2022  Insurance Authorization period Expiration: 8/12/2023  Visit # / Visits Authorized: 2 / 20  FOTO: 9/27/2022     Time In:2:00  Time Out: 2:45  Total Billable (one on one) Time: 45 minutes     Precautions: Standard and Fall    Subjective     Pt reports: "I am good. No pain today."   he was compliant with home exercise program given last session.   Response to previous treatment:good   Functional change: increased kyphotic posture and depressed left shoulder      Pain:0/10  Location: right arm; left arm    Objective     Physical Exam:  Postural examination/scapula alignment: Affected scapula upwardly rotated  Joint integrity: Firm end feeling  Skin integrity: intact   Edema: none present and pt does not report edema in upper extremities    Palpation: no pain with palpation      Joint Evaluation  AROM  9/27/2022 MMS   9/27/2022 AROM  9/27/2022 MMS   9/27/2022     Right Right Left Left   Scapular Elevation   5/5 WNL 5/5   Scapular Retraction   5/5 WNL 5/5   Shoulder Flex 0-180 109 3-/5   5/5   Shoulder Extension 0-80 59 3-/5   5/5   Shoulder Abd 0-180 125 3-/5   5/5   Shoulder ER 0-90 56 3-/5   5/5   Shoulder IR 0-90 S2 3-/5   5/5   Shoulder Horizontal adduction 0-90 35 3-/5   5/5   Elbow Flex/Ext 0-150 130/0 3/5   5/5   Wrist Flex 0-80 65 3-/5   5/5   Wrist Ext 0-70 50 3-/5   5/5   Supination 0-80 WNL 3/5   5/5   Pronation 0-80 WNL 3/5   5/5   Ulnar Deviation   3/5   5/5   Radial Deviation    3/5   5/5   *WNL - Within " Normal Limits  *NT = Not Tested     Fist: normal      Strength: (ADRIANNA Dynamometer in lbs.) Average 3 trials, Position II:       9/27/2022 9/27/2022   Rung II Right Left   Trial 1 34# 85#   Trial 2 39# 91#   Trial 3 40# 101#   Average 37.6# 92.3#      Normal  Average Values  Female Right Left Male: Right Left   20-29 66 lbs 62 lbs         94 lbs 86 lbs   30-39 68 lbs 64 lbs 90 lbs 82 lbs   40-49 64 lbs 62 lbs 80 lbs 74 lbs   50-59 62 lbs 57 lbs 72 lbs 65 lbs   60-69 53 lbs 51 lbs 63 lbs 56 lbs   70+ 44 lbs 42 lbs 54 lbs 48 lbs   SD = +/- 19lbs         Pinch Strength (Measured in lbs)       9/27/2022 9/27/2022     Right Left   Key Pinch 15 # 33.5 #   3pt Pinch 13 # 24 #   2pt Pinch 16 # 23 #         Fine/Gross Motor Coordination     Assessment   Right   9/27/2022 Left  9/27/2022   9 hole peg test 9 pegs in/out for time 1:30 0:34   *WNL - Within Normal Limits  *NT = Not Tested     Tone:  Modified Terra Scale:   1-  Slight increase in muscle tone, manifested by a catch and release or by minimal resistance at the end of the range of motion when the affected part(s) is moved in flexion or extension     Sensation:  Adarsh EGAN III  reports numbness on right side, due to time constraints, will assess first after        Sensation Intact  Impaired Comments    [place] Debra  Deep Touch (6.65) []  []       Protective Sensation (4.56) []  []       Light Touch (3.61) []  []                  Other Kinesthesia  [x]  []       Temperature [x]  []       Stereognosis  []  []         Balance:   Static Sit - NORMAL: No deviations seen in posture held statically  Dynamic sit- NORMAL: No deviations seen in posture held statically  Static Stand - FAIR+: Able to take MINIMAL challenges from all directions  Dynamic stand - FAIR+: Able to take MINIMAL challenges from all directions     Endurance Deficit: luis Pompa participated in neuromuscular re-education activities to improve: Coordination, Kinesthetic, Sense, Proprioception,  and Posture for 45 minutes. The following activities were included:  - Upper Body Ergometer (UBE) L1.5 for 6 minutes to provide proprioceptive awareness, postural stability, strength, activity tolerance, and reciprocal patterns with bimanual coordination completed to improve use of bilateral upper extremities in order to prepare for therapeutic exercises. Cues provided as needed for postural stability/positioning  - weightbearing on therapy ball, shoulder extension/internal rotation standing x10   - scapular elevation/retraction/posterior rotations standing 2x10  - quadruped with scapular pushups 2x10   - prone weightbearing into elbows x2 minutes   - prone - T's 2x10   - prone shoulder flexion/extension 2x10   - active assisted range of motion chest press/shoulder flexion 1# dowel 2x15  - supine shoulder horizontal abduction using yellow theraband x10     Home Exercises and Education Provided     Education provided:   - postural and active assisted range of motion exercises   - Progress towards goals     Written Home Exercises Provided: yes.  Exercises were reviewed and Peña was able to demonstrate them prior to the end of the session.  Peña demonstrated good  understanding of the HEP provided.   .   See EMR under Patient Instructions for exercises provided 10/5/2022.        Assessment     Peña tolerated today's session well. Interventions focused on postural alignment/stability, strength, proprioceptive awareness/input and stability in bilateral upper extremities, focusing on right. Peña exhibit depressed right shoulder and increased kyphotic posture upon arrival when ambulating. He was able to perform all exercises with min cues for range of motion and biomechanics. Tactile/physical cues provided with supine theraband exercise due to difficulty stabilizing right upper extremity. No pain with movements.     Peña is progressing well towards his goals and there are no updates to goals at this time. Pt prognosis is  Good.     Pt will continue to benefit from skilled outpatient occupational therapy to address the deficits listed in the problem list on initial evaluation provide pt/family education and to maximize pt's level of independence in the home and community environment.     Anticipated barriers to occupational therapy: none    Pt's spiritual, cultural and educational needs considered and pt agreeable to plan of care and goals.    Goals:   Short Term Goals: 5 weeks  - Pt will be independent with Home Exercise Program (HEP)/Home Activity Program (HAP) to promote long term maintenance of progress made in therapy. Not met, progressing  - Pt will demonstrate right shoulder flexion/abduction to 140* or better in order to improve participation with reaching tasks Not met, progressing  - Pt will verbalize understanding of sensory strategies to improve input to right upper extremity and increase safety with daily tasks. Not met, progressing  - Pt will increase right  strength to 45# or better in order to increase ease with housekeeping and cooking tasks. Not met, progressing  - Pt will improve right key/3pt/2pt pinch strength to 20#/15#/17# in order to improve ease with buttons, zippers, opening containers, etc Not met, progressing     Long Term Goals: 10 weeks  - Pt will improve FOTO score by 5 points or better in order to demonstrate improved use of right upper extremity with daily tasks Not met, progressing  - Pt will improve right manual muscle strength to 4/5 or better in all planes in order to return to prior level of function Not met, progressing  - Pt will increase right  strength to 60# or better in order to increase ease with housekeeping and cooking tasks. Not met, progressing  - Pt will improve right key/3pt/2pt pinch strength to 25#/18#/18# in order to improve ease with buttons, zippers, opening containers, etc  Not met, progressing  - Pt will complete 9 hole peg assessment in 1 minute or less using right  upper extremity in order to improve dexterity and gross motor coordination during dressing and leisure tasks Not met, progressing  - Pt will manage various sized containers with increased ease using bilateral upper extremities and adaptive equipment as needed in order to improve independence with cooking tasks. Not met, progressing  - Pt will use right upper extremity to place 5 objects of various weight on overhead shelf in order to improve safety and ease with household chores Not met, progressing  - Pt will report increased participation and independence with daily tasks using right upper extremity Not met, progressing    Plan     Updates/Grading for next session: continue with POC Corinne Rapier, OT

## 2022-10-12 NOTE — PROGRESS NOTES
"[facility]  Speech Therapy Treatment Note- Neurological Rehabilitation  Date: 10/12/2022     Name: Adarsh Wheeler   MRN: [id number]   Therapy Diagnosis:   Encounter Diagnoses   Name Primary?    Broca's aphasia Yes    Apraxia of speech      Physician: Kush Serrano MD  Physician Orders: [id number] - AMB REFERRAL/CONSULT TO SPEECH THERAPY  Medical Diagnosis: I63.9 (ICD-10-CM) - Cerebral infarction, unspecified    Visit #/ Visits Authorized: 4/ 15  Date of Evaluation:  7/18/2022   Insurance Authorization Period:  6/14/22- 12/11/22   Plan of Care Expiration Date:    9/12/22, 10/28/2022  Extended Plan of Care:  n/a   Progress Note: 10/19/2022   Visits Cancelled: 0  Visits No Show: 0    Time In:  1445  Time Out:  1530  Total Billable Time: 45 minutes     Precautions: Standard  Subjective:   Patient reports: everything we have been doing in therapy is too easy- see eduction section  He was compliant to home exercise program.   Response to previous treatment: notes everything is too easy  Pain Scale:  0/10 on a Visual Analog Scale currently.   Pain Location: no pain indicated throughout session  Objective:           UNTIMED  Procedure Min.   Speech- Language- Voice Therapy  45   Total Timed Units: 0  Total Untimed Units: 1  Charges Billed/Number of units: 1    Short Term Goals: (4 weeks) Current Progress:   1. Pt will complete VNeST for 4 verbs per session given minimal cues.    Progressing/ Not Met 10/12/2022   Verb Network Strengthening Training completed with minimal to moderate cues for 1 verb given extended time. Difficulty noted how the expansion of the related descriptors related back to the subject and not to other descriptors. Cues required for increasing though flexibility and use specific words, subjects, or occupations. The patient also had difficulty with generating multiple "who-verb-what" triads    Significantly extended time required today and maximum cues for how this relates to his " daily complaints.     2. Pt will follow attentive reading and constraint summarization with moderate cues for story retell with given constraints.    Progressing/ Not Met 10/12/2022   Not addressed in today's session.     3. Pt will follow complex multiunit commands with 90% accuracy independently.    Progressing/ Not Met 10/12/2022   Not addressed in today's session.        4. Pt will answer complex multiunit yes/no questions with 90% accuracy independently.      Progressing/ Not Met 10/12/2022   Not addressed in today's session.      Met x1   5. Pt will complete verbal reasoning tasks with minimal cues for accuracy.     Progressing/ Not Met 10/12/2022   Not addressed in today's session.        6. Pt will produce bisyllabic syllabic words with word initial /s/ with 85% acc across 3 probe trials.     Progressing/ Not Met 10/12/2022   .Not addressed in today's session.     Met x1   7. Pt will produce bisyllabic syllabic words with word initial /r/ with 85% acc across 3 probe trials.        Progressing/ Not Met 10/12/2022   Not addressed in today's session.     Met x1                                                                                                                                                                                                                                                                                                                                                                                                                                                                                                                                                                                                                                                                                                                                                                                                                                                                                                                                                                                                                                                                                                                                                                                                                                                                                                                                                                                                                                                                                                                                            Patient unscrambled sentences with 30% accuracy independently, accuracy marbella to 75% accuracy given written     Patient Education/Response:   Patient educated regarding the followin. Role of Verb Network Strengthening Training in recovery and the research behind it  2. Patient educated on accuracy verses complexity of the tasks    Patient verbalized understanding to all above education provided.     Home program established:  not yet established  Exercises were reviewed and Peña was able to demonstrate them prior to the end of the session.  Peña demonstrated good  understanding of the education provided.     See Electronic Medical Record under Patient Instructions for exercises provided throughout therapy.  Assessment:   Peña is progressing well towards his goals. Patient was pleasant. High level functional deficits with awareness of deficits and how treatment tasks relate to functional progress. Overall, patient with good participation, but no awareness of functional roles of language in conversational tasks. Current goals remain appropriate. Goals to be updated as necessary.     Patient prognosis is Good. Patient will continue to benefit from skilled outpatient speech and language therapy to address the deficits listed in the problem list on initial evaluation, provide patient/family education and to  maximize patient's level of independence in the home and community environment.   Medical necessity is demonstrated by the following IMPAIRMENTS:  Deficits in executive functioning and memory prevent the patient performing his work and personal daily activities effectively and efficiently which may put him at risk of unsafe behavior and a decline in quality of life.  Barriers to Therapy: transportation  Patient's spiritual, cultural and educational needs considered and patient agreeable to plan of care and goals.  Plan:   Continue Plan of Care with focus on communication deficits. Extend POC for 4 weeks.    Ev Yoo CCC-SLP   10/12/2022

## 2022-10-13 PROBLEM — R27.8 COORDINATION IMPAIRMENT: Status: RESOLVED | Noted: 2022-07-18 | Resolved: 2022-10-13

## 2022-10-13 PROBLEM — R26.9 GAIT DIFFICULTY: Status: RESOLVED | Noted: 2022-07-18 | Resolved: 2022-10-13

## 2022-10-13 PROBLEM — R29.898 WEAKNESS OF RIGHT LOWER EXTREMITY: Status: RESOLVED | Noted: 2022-07-18 | Resolved: 2022-10-13

## 2022-10-14 PROBLEM — R29.898 RIGHT LEG WEAKNESS: Status: ACTIVE | Noted: 2022-10-14

## 2022-10-14 PROBLEM — R26.9 GAIT ABNORMALITY: Status: ACTIVE | Noted: 2022-10-14

## 2022-10-17 NOTE — PLAN OF CARE
"[facility]  Physical Therapy Neurological Rehabilitation Initial Evaluation    Name: Adarsh Wheeler  Clinic Number: [id number]    Therapy Diagnosis:   Encounter Diagnoses   Name Primary?    S/P carotid endarterectomy     Left-sided cerebrovascular accident (CVA)     Right leg weakness     Gait abnormality      Physician: ROSANNA Servin III, MD    Physician Orders: PT Eval and Treat   Medical Diagnosis from Referral:   Z98.890 (ICD-10-CM) - S/P carotid endarterectomy   I63.9 (ICD-10-CM) - Left-sided cerebrovascular accident (CVA)   Evaluation Date: 10/12/2022  Authorization Period Expiration: 9/28/2023  Plan of Care Expiration: 12/9/2022  Visit # / Visits authorized: 1/1    Time In: 1:18PM  Time Out: 2:00PM  Total Billable Time: 42 minutes (1 low eval; 2 TE)    Precautions: Standard, Fall, and Broca's asphasia    Subjective   Date of onset: December 2021 (stroke); August 2022 (carotid endarterectomy)  History of current condition - Peña reports: "Everything is the same; I'm just starting to get back into feeling using my arm; this leg is gaining balance." Peña presents to PT once again after large interim in care. After initial evaluation at this clinic/one follow up appointment in July of this year, he underwent a carotid endarterectomy in August. Now he presents again to [facility] to continue care after surgery. Per vascular surgery orders, he has been cleared for activity and can now resume PT.    Per previous PT evaluation, original stroke occurred in December of 2021. He attended inpatient therapy at [facility] for one month and [facility]. Residual right sided weakness, incoordination, and Broca's asphasia.      Medical History:   No past medical history on file.    Surgical History:   Adarsh Wheeler  has a past surgical history that includes Eye surgery; Foot surgery; Hand surgery; Carotid endarterectomy (Left, 8/17/2022); " "Re-exploration for bleeding (Left, 2022); and Evacuation of hematoma (Left, 2022).    Medications:   Adarsh EGAN III has a current medication list which includes the following prescription(s): acetaminophen, aspirin, duloxetine, lactulose, lisinopril, melatonin, ondansetron, oxycodone, polyethylene glycol, rosuvastatin, sertraline, and trazodone.    Allergies:   Review of patient's allergies indicates:   Allergen Reactions    Mirtazapine Rash      Imaging  - CTA Head and Neck (2022): 1. Interval left carotid endarterectomy with at least 4 x 3.7 x 8.1 cm hematoma in the left sternocleidomastoid muscle without definite active extravasation. 2. Severe high-grade left and mild-moderate right stenosis in the mid V4 vertebral arteries. 3. 60-70% stenosis near the origin of the right ICA.    Prior Therapy: [facility]; [facility]; eval + 1 treat at this facility earlier this year  Social History: Lives alone  Falls: No falls since last seen in PT  DME: No  Home Environment: Single story home   Exercise Routine / History: Walking and performing light body weight exercise at home  Family Present at time of Eval: No    Occupation: Unemployed;    Prior Level of Function: independent   Current Level of Function: modified independent     Pain:  Current 0/10, worst 4/10, best 0/10   Location: right shoulder   Description: "tight"  Aggravating Factors: could not endorse  Easing Factors: could not endorse    Patient's goals: Improve right leg strength and balance    Objective     - Command followin% simple; 75% complex   - Speech: See SLP eval (Broca's Aphasia)    Mental status: alert, oriented to person, place, and time  Behavior:  calm, cooperative, and adequate rapport can be established  Attention Span and Concentration:  Normal    Dominant hand:  right     Posture Alignment: Rounded shoulder/protracted scapulae; forward head; excess thoracic kyphosis    Coordination:   - fine " motor: See occupational therapy eval  - UE coordination: See occupational therapy eval    - LE coordination:  Moderate impairment noted during gait cycle     Lower Extremity Strength   RLE LLE   Hip Flexion: 4/5 5/5   Hip Extension:  4/5 4+/5   Hip Abduction: 4+/5 5/5   Knee Extension: 4+/5 5/5   Knee Flexion: 4+/5 5/5   Ankle Dorsiflexion: 4+/5 5/5     30-Second Sit to Stand: 11 repetitions    Functional Gait Assessment:     1. Gait on level surface =  2   (3) Normal: less than 5.5 sec, no A.D., no imbalance, normal gait pattern, deviates <6in   (2) Mild impairment: 7-5.6 sec, uses A.D., mild gait deviations, or deviates 6-10 in   (1) Moderate impairment: > 7 sec, slow speed, imbalance, deviates 10-15 in.   (0) Severe impairment: needs assist, deviates >15 in, reach/touch wall  2. Change in Gait Speed = 3   (3) Normal: smooth change w/o loss of balance or gait deviation, deviates < 6 in, significant difference between speeds   (2) Mild impairment: changes speed, but demonstrates mild gait deviations, deviates 6-10 in, OR no deviations but unable to significantly speed, OR uses A.D.   (1) Moderate impairment: minor changes to speed, OR changes speed w/ significant deviations, deviates 10-15 in, OR  Changes speed , but loses balance & recovers   (0) Severe impairment: cannot change speed, deviates >15 in, or loses balance & needs assist  3. Gait with horizontal head turns  = 2   (3) Normal: no change in gait, deviates <6 in   (2) Mild impairment: slight change in speed, deviates 6-10 in, OR uses A.D.   (1) Moderate impairment: moderate change in speed, deviates 10-15 in   (0) Severe impairment: severe disruption of gait, deviates >15in  4. Gait with vertical head turns = 2   (3) Normal: no change in gait, deviates <6 in   (2) Mild impairment: slight change in speed, deviates 6-10 in OR uses A.D.   (1) Moderate impairment: moderate change in speed, deviates 10-15 in   (0) Severe impairment: severe disruption of gait,  deviates >15 in  5. Gait with pivot turns = 3   (3) Normal: performs safely in 3 sec, no LOB   (2) Mild impairment: performs in >3 sec & no LOB, OR turns safely & requires several steps to regain LOB   (1) Moderate impairment: turns slow, OR requires several small steps for balance following turn & stop   (0) Severe impairment: cannot turn safely, needs assist  6. Step over obstacle = 2   (3) Normal: steps over 2 stacked boxes w/o change in speed or LOB   (2) Mild impairment: able to step over 1 box w/o change in speed or LOB   (1) Moderate impairment: steps over 1 box but must slow down, may require VC   (0) Severe impairment: cannot perform w/o assist  7. Gait with Narrow ARTURO = 2   (3) Normal: 10 steps no staggering   (2) Mild impairment: 7-9 steps   (1) Moderate impairment: 4-7 steps   (0) Severe impairment: < 4 steps or cannot perform w/o assist  8. Gait with eyes closed = 1   (3) Normal: < 7 sec, no A.D., no LOB, normal gait pattern, deviates <6 in   (2) Mild impairment: 7.1-9 sec, mild gait deviations, deviates 6-10 in   (1) Moderate impairment: > 9 sec, abnormal pattern, LOB, deviates 10-15 in   (0) Severe impairment: cannot perform w/o assist, LOB, deviates >15in  9. Ambulating Backwards = 1   (3) Normal: no A.D., no LOB, normal gait pattern, deviates <6in   (2) Mild impairment: uses A.D., slower speed, mild gait deviations, deviates 6-10 in   (1) Moderate impairment: slow speed, abnormal gait pattern, LOB, deviates 10-15 in   (0) Severe impairment: severe gait deviations or LOB, deviates >15in  10. Steps = 3   (3) Normal: alternating feet, no rail   (2) Mild Impairment: alternating feet, uses rail   (1) Moderate impairment: step-to, uses rail   (0) Severe impairment: cannot perform safely    Score 21/30     Cutoffs:   <22/30 fall risk in older adults  <18/30 fall risk in Parkinsons    MDC/MCID:  Stroke = 4.2 points (MDC)  Vestibular = 6 points (MDC)  Geriatric = 4 points (MCID)  Parkinson's = 4 points  "([facility] [place])     Gait Assessment:   - AD used: None  - Assistance: Supervision  - Distance: 100+ feet throughout course of evaluation  - Stairs: See FGA    Gait Analysis:  Deviations noted: Decreased reciprocal right arm swing; decreased right knee flexion/extension synergy during swing phase    Impairments contributing to deviations: Right spastic hemiparesis     Not recollected today - initiate new episode       TREATMENT   Treatment Time In: 1:35PM  Treatment Time Out: 2:00PM  Total Treatment time separate from Evaluation: 25 minutes    Peña received therapeutic exercises to develop strength and endurance for 25 minutes including:  -- Staggered sit to stands from chair height with 8# 2x15  -- Standing hip abduction with stance leg on green foam disc 2x12B green theraband   -- Standing heel raises from green foam disc 2x15  -- Floor lunges with left upper extremity support x10B  -- Fitter calf stretch 2x45"  -- HEP instruction    Home Exercises and Patient Education Provided    Education provided:   - PT plan of care  - HEP instruction    Written Home Exercises Provided: yes.  Exercises were reviewed and Peña was able to demonstrate them prior to the end of the session.  Peña demonstrated good  understanding of the education provided.     See EMR under Patient Instructions for exercises provided 10/12/2022.    Assessment   Adarsh EGAN III is a 57 y.o. male referred to outpatient Physical Therapy with a medical diagnosis of (1) Z98.890 (ICD-10-CM) - S/P carotid endarterectomy and (2) I63.9 (ICD-10-CM) - Left-sided cerebrovascular accident (CVA). Patient presents with right lower extremity strength deficits; high level balance deficits per FGA; decreased bilateral lower extremity muscular power/endurance; gait abnormality; and postural dysfunction. He would benefit from skilled physical therapy services to address listed deficits, decrease risk for future falls/injury, improve independence with physical activity, and improve " overall quality of life.    Patient prognosis is Excellent.   Patient will benefit from skilled outpatient Physical Therapy to address the deficits stated above and in the chart below, provide patient/family education, and to maximize patient's level of independence.     Plan of care discussed with patient: Yes  Patient's spiritual, cultural and educational needs considered and patient is agreeable to the plan of care and goals as stated below:     Anticipated Barriers for therapy: Chronicity of impairments     Medical Necessity is demonstrated by the following  History  Co-morbidities and personal factors that may impact the plan of care Co-morbidities:   Stroke    Personal Factors:   no deficits     low   Examination  Body Structures and Functions, activity limitations and participation restrictions that may impact the plan of care Body Regions:   lower extremities  upper extremities  trunk    Body Systems:    gross symmetry  strength  gross coordinated movement  balance  gait  transfers  transitions  motor control  motor learning    Participation Restrictions:   Decreased quality of life secondary to impairments    Activity limitations:   Learning and applying knowledge  no deficits    General Tasks and Commands  no deficits    Communication  communicating with/receiving spoken language  communicating with/receiving non-verbal language    Mobility  lifting and carrying objects  fine hand use (grasping/picking up)  walking    Self care  no deficits    Domestic Life  no deficits    Interactions/Relationships  no deficits    Life Areas  employment    Community and Social Life  community life  recreation and leisure         high   Clinical Presentation evolving clinical presentation with changing clinical characteristics moderate   Decision Making/ Complexity Score: low     Goals:  Short Term Goals: 4 weeks   Patient will be compliant with HEP in order to maximize PT benefits  Patient will improve bilateral lower  extremity MMT grades by >/=1/2 grade in order to improve strength for ADL completion  Patient will score >/= 23/30 on FGA in order to reduce risk for falls and improve postural control    Long Term Goals: 8 weeks   Patient will improve bilateral lower extremity MMT grades by >/=1 grade in order to improve strength for ADL completion  Patient will score >/= 25/30 on FGA in order to reduce risk for falls and improve postural control  Patient will score >/= 14 repetitions on 30-Second Sit to  order to improve BLE endurance and muscular power for transfers   Patient will perform 1 floor <> stand transfer with bilateral upper extremity support in order to demonstrate safe functional mobility in case of future fall  Patient will report 0 falls from initiation of PT management  Patient will begin some form of home/community fitness in order to sustain progress gained in PT    Plan   Plan of care Certification: 10/12/2022 to 12/9/2022.    Outpatient Physical Therapy 2 times weekly for 8 weeks to include the following interventions: Gait Training, Manual Therapy, Moist Heat/ Ice, Neuromuscular Re-ed, Orthotic Management and Training, Patient Education, Self Care, Therapeutic Activities, Therapeutic Exercise, and Modalities PRN.     RAIN HOGAN, PT

## 2022-10-19 ENCOUNTER — CLINICAL SUPPORT (OUTPATIENT)
Dept: REHABILITATION | Facility: HOSPITAL | Age: 57
End: 2022-10-19
Payer: OTHER GOVERNMENT

## 2022-10-19 DIAGNOSIS — R48.2 APRAXIA OF SPEECH: ICD-10-CM

## 2022-10-19 DIAGNOSIS — M62.81 MUSCLE WEAKNESS: Primary | ICD-10-CM

## 2022-10-19 DIAGNOSIS — R27.9 LACK OF COORDINATION: ICD-10-CM

## 2022-10-19 DIAGNOSIS — R47.01 BROCA'S APHASIA: Primary | ICD-10-CM

## 2022-10-19 PROCEDURE — 92507 TX SP LANG VOICE COMM INDIV: CPT | Mod: PN

## 2022-10-19 PROCEDURE — 97112 NEUROMUSCULAR REEDUCATION: CPT | Mod: PN,59

## 2022-10-19 NOTE — PROGRESS NOTES
[facility]  Speech Therapy Treatment Note- Neurological Rehabilitation  Progress Note  Date: 10/19/2022     Name: Adarsh Wheeler   MRN: [id number]   Therapy Diagnosis:   Encounter Diagnoses   Name Primary?    Broca's aphasia Yes    Apraxia of speech      Physician: Kush Serrano MD  Physician Orders: [id number] - AMB REFERRAL/CONSULT TO SPEECH THERAPY  Medical Diagnosis: I63.9 (ICD-10-CM) - Cerebral infarction, unspecified    Visit #/ Visits Authorized: 5/ 15  Date of Evaluation:  7/18/2022   Insurance Authorization Period:  6/14/22- 12/11/22   Plan of Care Expiration Date:    9/12/22, 10/28/2022  Extended Plan of Care:  n/a   Progress Note: 10/19/2022- this note  Visits Cancelled: 0  Visits No Show: 0    Time In:  1445  Time Out:  1525  Total Billable Time: 45 minutes     Precautions: Standard  Subjective:   Patient reports: he is noting improvement in speech  He was compliant to home exercise program.   Response to previous treatment: improvement in word finding and verbal fluency   Pain Scale:  0/10 on a Visual Analog Scale currently.   Pain Location: no pain indicated throughout session  Objective:           UNTIMED  Procedure Min.   Speech- Language- Voice Therapy  40   Total Timed Units: 0  Total Untimed Units: 1  Charges Billed/Number of units: 1    Short Term Goals: (4 weeks) Current Progress:   1. Pt will complete VNeST for 4 verbs per session given minimal cues.    Progressing/ Not Met 10/19/2022   Verb Network Strengthening Training completed with minimal to moderate cues for 1 verb given extended time. Noted ease from previous session when attempting. Extended time continues to be required.    2. Pt will follow attentive reading and constraint summarization with moderate cues for story retell with given constraints.    Progressing/ Not Met 10/19/2022   Completed today for two paragraphs with moderate to maximum cues for constraints.      3. Pt will follow complex multiunit commands  with 90% accuracy independently.    Progressing/ Not Met 10/19/2022   Not addressed in today's session.        4. Pt will answer complex multiunit yes/no questions with 90% accuracy independently.      Progressing/ Not Met 10/19/2022   Completed with 100% accuracy independently.     Met x2   5. Pt will complete verbal reasoning tasks with minimal cues for accuracy.     Progressing/ Not Met 10/19/2022   Not addressed in today's session.        6. Pt will produce bisyllabic syllabic words with word initial /s/ with 85% acc across 3 probe trials.     Progressing/ Not Met 10/19/2022   .Not addressed in today's session.     Met x1   7. Pt will produce bisyllabic syllabic words with word initial /r/ with 85% acc across 3 probe trials.        Progressing/ Not Met 10/19/2022   Not addressed in today's session.     Met x1                                                                                                                                                                                                                                                                                                                                                                                                                                                                                                                                                                                                                                                                                                                                                                                                                                                                                                                                                                                                                                                                                                                                                                                                                                                                                                                                                                                                                                                                                                                                              Patient Education/Response:   Patient educated regarding the followin. Therapy tasks in the role within recovery     Patient verbalized understanding to all above education provided.     Home program established:  not yet established  Exercises were reviewed and Peña was able to demonstrate them prior to the end of the session.  Peña demonstrated good  understanding of the education provided.     See Electronic Medical Record under Patient Instructions for exercises provided throughout therapy.  Assessment:   Peña is progressing well towards his goals. Patient was pleasant. Good tolerance of new therapy task added today which was a higher level of difficulty. Good overall understanding of feedback. High level auditory comprehension deficits remain. Over the last 30 days, the patient has demonstrated improvement in word finding and verbal fluency; however, continues to present with noticeable Broca's aphasia. Good improvement in verbal fluency noted with Attentive reading and Constraint summarization today. Current goals remain appropriate. Goals to be updated as necessary.     Patient prognosis is Good. Patient will continue to benefit from skilled outpatient speech and language therapy to address the deficits listed in the problem list on initial evaluation, provide patient/family education and to maximize patient's level of independence in the home and community environment.   Medical necessity is demonstrated by the following IMPAIRMENTS:  Deficits in executive functioning and memory prevent the patient performing his work and personal daily activities  effectively and efficiently which may put him at risk of unsafe behavior and a decline in quality of life.  Barriers to Therapy: transportation  Patient's spiritual, cultural and educational needs considered and patient agreeable to plan of care and goals.  Plan:   Continue Plan of Care with focus on communication deficits.     Ev Yoo CCC-SLP   10/19/2022

## 2022-10-19 NOTE — PROGRESS NOTES
"Occupational Therapy Daily Treatment Note     Name: Adarsh Wheeler  [facility] Number: [id number]    Therapy Diagnosis:   Encounter Diagnoses   Name Primary?    Muscle weakness Yes    Lack of coordination        Physician: Stefan Woods*    Visit Date: 10/19/2022    Physician Orders: OT eval and treat   Medical Diagnosis: R53.1 (ICD-10-CM) - Right sided weakness I63.9 (ICD-10-CM) - Left-sided cerebrovascular accident (CVA)   Evaluation Date: 9/27/2022  Plan of Care Expiration Period: 12/9/2022  Insurance Authorization period Expiration: 8/12/2023  Visit # / Visits Authorized: 3 / 20  FOTO: 9/27/2022     Time In:2:00  Time Out: 2:45  Total Billable (one on one) Time: 45 minutes     Precautions: Standard and Fall    Subjective     Pt reports: "I am good. My shoulder is ok"   he was compliant with home exercise program given last session.   Response to previous treatment:good   Functional change: Peña reported fatigue following last session that lasted until Friday (2 days)    Pain:0/10  Location: right arm; left arm    Objective     Physical Exam:  Postural examination/scapula alignment: Affected scapula upwardly rotated  Joint integrity: Firm end feeling  Skin integrity: intact   Edema: none present and pt does not report edema in upper extremities    Palpation: no pain with palpation      Joint Evaluation  AROM  9/27/2022 MMS   9/27/2022 AROM  9/27/2022 MMS   9/27/2022     Right Right Left Left   Scapular Elevation   5/5 WNL 5/5   Scapular Retraction   5/5 WNL 5/5   Shoulder Flex 0-180 109 3-/5   5/5   Shoulder Extension 0-80 59 3-/5   5/5   Shoulder Abd 0-180 125 3-/5   5/5   Shoulder ER 0-90 56 3-/5   5/5   Shoulder IR 0-90 S2 3-/5   5/5   Shoulder Horizontal adduction 0-90 35 3-/5   5/5   Elbow Flex/Ext 0-150 130/0 3/5   5/5   Wrist Flex 0-80 65 3-/5   5/5   Wrist Ext 0-70 50 3-/5   5/5   Supination 0-80 WNL 3/5   5/5   Pronation 0-80 WNL 3/5   5/5   Ulnar Deviation   3/5   5/5   Radial Deviation    3/5  "  5/5   *WNL - Within Normal Limits  *NT = Not Tested     Fist: normal      Strength: (ADRIANNA Dynamometer in lbs.) Average 3 trials, Position II:       9/27/2022 9/27/2022   Rung II Right Left   Trial 1 34# 85#   Trial 2 39# 91#   Trial 3 40# 101#   Average 37.6# 92.3#      Normal  Average Values  Female Right Left Male: Right Left   20-29 66 lbs 62 lbs         94 lbs 86 lbs   30-39 68 lbs 64 lbs 90 lbs 82 lbs   40-49 64 lbs 62 lbs 80 lbs 74 lbs   50-59 62 lbs 57 lbs 72 lbs 65 lbs   60-69 53 lbs 51 lbs 63 lbs 56 lbs   70+ 44 lbs 42 lbs 54 lbs 48 lbs   SD = +/- 19lbs         Pinch Strength (Measured in lbs)       9/27/2022 9/27/2022     Right Left   Key Pinch 15 # 33.5 #   3pt Pinch 13 # 24 #   2pt Pinch 16 # 23 #         Fine/Gross Motor Coordination     Assessment   Right   9/27/2022 Left  9/27/2022   9 hole peg test 9 pegs in/out for time 1:30 0:34   *WNL - Within Normal Limits  *NT = Not Tested     Tone:  Modified Terra Scale:   1-  Slight increase in muscle tone, manifested by a catch and release or by minimal resistance at the end of the range of motion when the affected part(s) is moved in flexion or extension     Sensation:  Adarsh EGAN III  reports numbness on right side, due to time constraints, will assess first after        Sensation Intact  Impaired Comments    [place] Debra  Deep Touch (6.65) []  []       Protective Sensation (4.56) []  []       Light Touch (3.61) []  []                  Other Kinesthesia  [x]  []       Temperature [x]  []       Stereognosis  []  []         Balance:   Static Sit - NORMAL: No deviations seen in posture held statically  Dynamic sit- NORMAL: No deviations seen in posture held statically  Static Stand - FAIR+: Able to take MINIMAL challenges from all directions  Dynamic stand - FAIR+: Able to take MINIMAL challenges from all directions     Endurance Deficit: luis Pompa participated in neuromuscular re-education activities to improve: Coordination, Kinesthetic,  Sense, Proprioception, and Posture for 45 minutes. The following activities were included:  - Upper Body Ergometer (UBE) L1.5 for 6 minutes to provide proprioceptive awareness, postural stability, strength, activity tolerance, and reciprocal patterns with bimanual coordination completed to improve use of bilateral upper extremities in order to prepare for therapeutic exercises. Cues provided as needed for postural stability/positioning  - scapular elevation/retraction/posterior rotations x15  - seated shoulder flexion/chest press/ clockwise/counter clockwise circles 2x15 2# dowel   - quadruped with scapular pushups 2x10   - quadruped with functional reach forward and backward x10 bilateral upper extremities   - seated scap retraction 2x15 with red theraband       Home Exercises and Education Provided     Education provided:   - postural and active assisted range of motion exercises   - Progress towards goals     Written Home Exercises Provided: yes.  Exercises were reviewed and Peña was able to demonstrate them prior to the end of the session.  Peña demonstrated good  understanding of the HEP provided.   .   See EMR under Patient Instructions for exercises provided 10/5/2022.        Assessment     Peña tolerated today's session well. He arrived with right shoulder depressed compared to left and increased kyphotic posture. Interventions focused on postural correction, proprioceptive awareness/input and strength in right upper extremity. He was able to tolerate all exercises with min-mod cues for biomechanics. Peña reported fatigue after 15 reps and noted weakness in right vs left with ataxic movements present. He was able to complete quadruped exercises with active range of motion of right/left upper extremity and weightbearing without pain. Therapist provided assistance as needed to maximize range of right upper extremity for flexion and extension.    Peña is progressing well towards his goals and there are no  updates to goals at this time. Pt prognosis is Good.     Pt will continue to benefit from skilled outpatient occupational therapy to address the deficits listed in the problem list on initial evaluation provide pt/family education and to maximize pt's level of independence in the home and community environment.     Anticipated barriers to occupational therapy: none    Pt's spiritual, cultural and educational needs considered and pt agreeable to plan of care and goals.    Goals:   Short Term Goals: 5 weeks  - Pt will be independent with Home Exercise Program (HEP)/Home Activity Program (HAP) to promote long term maintenance of progress made in therapy. Not met, progressing  - Pt will demonstrate right shoulder flexion/abduction to 140* or better in order to improve participation with reaching tasks Not met, progressing  - Pt will verbalize understanding of sensory strategies to improve input to right upper extremity and increase safety with daily tasks. Not met, progressing  - Pt will increase right  strength to 45# or better in order to increase ease with housekeeping and cooking tasks. Not met, progressing  - Pt will improve right key/3pt/2pt pinch strength to 20#/15#/17# in order to improve ease with buttons, zippers, opening containers, etc Not met, progressing     Long Term Goals: 10 weeks  - Pt will improve FOTO score by 5 points or better in order to demonstrate improved use of right upper extremity with daily tasks Not met, progressing  - Pt will improve right manual muscle strength to 4/5 or better in all planes in order to return to prior level of function Not met, progressing  - Pt will increase right  strength to 60# or better in order to increase ease with housekeeping and cooking tasks. Not met, progressing  - Pt will improve right key/3pt/2pt pinch strength to 25#/18#/18# in order to improve ease with buttons, zippers, opening containers, etc  Not met, progressing  - Pt will complete 9 hole peg  assessment in 1 minute or less using right upper extremity in order to improve dexterity and gross motor coordination during dressing and leisure tasks Not met, progressing  - Pt will manage various sized containers with increased ease using bilateral upper extremities and adaptive equipment as needed in order to improve independence with cooking tasks. Not met, progressing  - Pt will use right upper extremity to place 5 objects of various weight on overhead shelf in order to improve safety and ease with household chores Not met, progressing  - Pt will report increased participation and independence with daily tasks using right upper extremity Not met, progressing    Plan     Updates/Grading for next session: continue with POC Corinne Rapier, OT

## 2022-10-26 ENCOUNTER — CLINICAL SUPPORT (OUTPATIENT)
Dept: REHABILITATION | Facility: HOSPITAL | Age: 57
End: 2022-10-26
Attending: STUDENT IN AN ORGANIZED HEALTH CARE EDUCATION/TRAINING PROGRAM
Payer: OTHER GOVERNMENT

## 2022-10-26 ENCOUNTER — TELEPHONE (OUTPATIENT)
Dept: REHABILITATION | Facility: HOSPITAL | Age: 57
End: 2022-10-26
Payer: OTHER GOVERNMENT

## 2022-10-26 ENCOUNTER — CLINICAL SUPPORT (OUTPATIENT)
Dept: REHABILITATION | Facility: HOSPITAL | Age: 57
End: 2022-10-26
Payer: OTHER GOVERNMENT

## 2022-10-26 DIAGNOSIS — R48.2 APRAXIA OF SPEECH: ICD-10-CM

## 2022-10-26 DIAGNOSIS — R27.9 LACK OF COORDINATION: ICD-10-CM

## 2022-10-26 DIAGNOSIS — R26.9 GAIT ABNORMALITY: ICD-10-CM

## 2022-10-26 DIAGNOSIS — R47.01 BROCA'S APHASIA: Primary | ICD-10-CM

## 2022-10-26 DIAGNOSIS — M62.81 MUSCLE WEAKNESS: Primary | ICD-10-CM

## 2022-10-26 DIAGNOSIS — R29.898 RIGHT LEG WEAKNESS: Primary | ICD-10-CM

## 2022-10-26 PROCEDURE — 97112 NEUROMUSCULAR REEDUCATION: CPT | Mod: PN

## 2022-10-26 PROCEDURE — 97112 NEUROMUSCULAR REEDUCATION: CPT | Mod: PN,CQ

## 2022-10-26 PROCEDURE — 97110 THERAPEUTIC EXERCISES: CPT | Mod: PN,CQ

## 2022-10-26 PROCEDURE — 92507 TX SP LANG VOICE COMM INDIV: CPT | Mod: PN

## 2022-10-26 NOTE — PROGRESS NOTES
"  [facility]   Physical Therapy Treatment Note     Name: Adarsh Wheeler  Clinic Number: [id number]    Therapy Diagnosis:   Encounter Diagnoses   Name Primary?    Right leg weakness Yes    Gait abnormality      Physician: Franco Thompson MD    Visit Date: 10/26/2022    Physician: ROSANNA Servin III, MD     Physician Orders: PT Eval and Treat   Medical Diagnosis from Referral:   Z98.890 (ICD-10-CM) - S/P carotid endarterectomy   I63.9 (ICD-10-CM) - Left-sided cerebrovascular accident (CVA)   Evaluation Date: 10/12/2022  Authorization Period Expiration: 9/28/2023  Plan of Care Expiration: 12/9/2022  Visit # / Visits authorized: 2/1 (+eval)  FOTO #: 1/5  PTA Visit #: 1/5     Time In: 2:45 PM  Time Out: 3:30 PM  Total Billable Time: 45 minutes (2 NMR + 1 TE)    SUBJECTIVE     Pt reports: no complaints of pain at this time, nothing new to report.  He was compliant with home exercise program.  Response to previous treatment: initial eval  Functional change: ongoing    Pain: 0/10  Location: NA      OBJECTIVE     Objective Measures updated at progress report unless specified.     Treatment     Peña received the treatments listed below:      therapeutic exercises to develop strength, endurance, ROM and flexibility for 20 minutes including:  - reciprocal stepper bike x 8 minutes level 4 single peak setting  - standing gastroc stretch 3x30"B  - standing heel-toe raises off green foam oval 2x15  - staggered sit to stands from chair height with 10# 2x15  - standing hip abduction with stance leg on green foam disc 2x15B green theraband  - standing lunges to bosu: x10 B forward with single upper extremity support                                            : x10 B lateral with single upper extremity support     neuromuscular re-education activities to improve: Balance, Coordination, Kinesthetic, Sense and Proprioception for 25 minutes. The following activities were included:  - tandem stance on " "bosu and red med ball chest press 2x10 B  - reciprocal step ups on soft fitter board with red med ball overhead press: 2x10  - reciprocal cone taps from soft fitter board: 2x10  - lateral steps on airex beam: 4 lengths x 10 feet  - lateral steps on airex beam with hurdles (4): 4 lengths x 10 feet  - tandem walking solid ground: 4 lengths x 10 feet    Not performed today:  - semi-tandem stance on 6" step + red med ball chest press x10B  - tandem walking with fingertip support 4 lengths x 10 feet each  - lateral walks + cone taps with minimal upper extremity support x 5 lengths x 10 feet each  - stand <> half kneel transfers x 5B with left upper extremity support      Patient Education and Home Exercises     Home Exercises Provided and Patient Education Provided     Education provided:   - daily HEP compliance    Written Home Exercises Provided: Patient instructed to cont prior HEP. Exercises were reviewed and Peña was able to demonstrate them prior to the end of the session.  Peña demonstrated good  understanding of the education provided. See EMR under Patient Instructions for exercises provided during therapy sessions    ASSESSMENT     Peña arrived to session without any complaints of pain and was agreeable to treatment.  Session consisted of moderate level balance training and BLE strengthening with a focus on R sided weight bearing.  Good tolerance of first follow up session after initial evaluation.  Fingertip upper extremity support utilized as needed with dynamic balance to achieve the appropriate challenge level.  One loss of balance posteriorly during step ups on the soft board that required assistance from PTA for recovery.  He would benefit from continued PT services to improve balance deficits and improve overall functional mobility.         Peña Is progressing well towards his goals.   Pt prognosis is Excellent.     Pt will continue to benefit from skilled outpatient physical therapy to address the " deficits listed in the problem list box on initial evaluation, provide pt/family education and to maximize pt's level of independence in the home and community environment.     Pt's spiritual, cultural and educational needs considered and pt agreeable to plan of care and goals.     Anticipated barriers to physical therapy: Chronicity of impairments     Goals: Short Term Goals: 4 weeks   Patient will be compliant with HEP in order to maximize PT benefits  Patient will improve bilateral lower extremity MMT grades by >/=1/2 grade in order to improve strength for ADL completion  Patient will score >/= 23/30 on FGA in order to reduce risk for falls and improve postural control     Long Term Goals: 8 weeks   Patient will improve bilateral lower extremity MMT grades by >/=1 grade in order to improve strength for ADL completion  Patient will score >/= 25/30 on FGA in order to reduce risk for falls and improve postural control  Patient will score >/= 14 repetitions on 30-Second Sit to  order to improve BLE endurance and muscular power for transfers   Patient will perform 1 floor <> stand transfer with bilateral upper extremity support in order to demonstrate safe functional mobility in case of future fall  Patient will report 0 falls from initiation of PT management  Patient will begin some form of home/community fitness in order to sustain progress gained in PT    PLAN     Plan to cont with progression of PT goals per POC.    eLsvia Laughlin, PTA

## 2022-10-26 NOTE — TELEPHONE ENCOUNTER
Patient sister had requested a call regarding the progress of his speech therapy. Speech Language Pathologist and sister discussed patient's language in social situations and discussed tasks completed in therapy. Home exercise program discussed.    Ev Yoo M.A. CCC-SLP, IS  Speech Language Pathologist  Certified Brain Injury Specialist  10/26/2022

## 2022-10-26 NOTE — PROGRESS NOTES
[facility]  Speech Therapy Treatment Note- Neurological Rehabilitation    Date: 10/26/2022     Name: Adarsh Wheeler   MRN: [id number]   Therapy Diagnosis:   Encounter Diagnoses   Name Primary?    Broca's aphasia Yes    Apraxia of speech      Physician: Kush Serrano MD  Physician Orders: [id number] - AMB REFERRAL/CONSULT TO SPEECH THERAPY  Medical Diagnosis: I63.9 (ICD-10-CM) - Cerebral infarction, unspecified    Visit #/ Visits Authorized: 6/ 15  Date of Evaluation:  7/18/2022   Insurance Authorization Period:  6/14/22- 12/11/22   Plan of Care Expiration Date:    9/12/22, 10/28/2022- see Plan of Care update  Extended Plan of Care:  n/a   Progress Note: 10/19/2022- this note  Visits Cancelled: 0  Visits No Show: 0    Time In:  1315  Time Out:  1400  Total Billable Time: 45 minutes     Precautions: Standard  Subjective:   Patient reports: he is noting improvement in speech- continued- but states it is easier to communicate with Speech Language Pathologist   He was compliant to home exercise program.   Response to previous treatment: improvement in word finding and verbal fluency   Pain Scale:  0/10 on a Visual Analog Scale currently.   Pain Location: no pain indicated throughout session  Objective:           UNTIMED  Procedure Min.   Speech- Language- Voice Therapy  40   Total Timed Units: 0  Total Untimed Units: 1  Charges Billed/Number of units: 1    Short Term Goals: (4 weeks) Current Progress:   1. Pt will complete VNeST for 4 verbs per session given minimal cues.    Progressing/ Not Met 10/26/2022   Not addressed in today's session.    2. Pt will follow attentive reading and constraint summarization with moderate cues for story retell with given constraints.    Progressing/ Not Met 10/26/2022   Completed today for two low to moderate level paragraphs with moderate to maximum cues for constraints.     Noted deficits improved with smaller chunks of information.      3. Pt will follow complex  multiunit commands with 90% accuracy independently.    Progressing/ Not Met 10/26/2022   Not addressed in today's session.        4. Pt will answer complex multiunit yes/no questions with 90% accuracy independently.     Completed with 100% accuracy independently.     Goal Met 10/26/2022     5. Pt will complete verbal reasoning tasks with minimal cues for accuracy.     Progressing/ Not Met 10/26/2022   Not addressed in today's session.        6. Pt will produce bisyllabic syllabic words with word initial /s/ with 85% acc across 3 probe trials.     Progressing/ Not Met 10/26/2022   .Not addressed in today's session.     Met x1   7. Pt will produce bisyllabic syllabic words with word initial /r/ with 85% acc across 3 probe trials.     Progressing/ Not Met 10/26/2022   Not addressed in today's session.     Met x1                                                                                                                                                                                                                                                                                                                                                                                                                                                                                                                                                                                                                                                                                                                                                                                                                                                                                                                                                                                                                                                                                                                                                                                                                                                                                                                                                                                                                                                                                                                                              Patient Education/Response:   Patient educated regarding the followin. Plan of Care update  2. Progress within therapy  3. Discussed aphasia card and pacing strategies for Aquired Apraxia of Speech   4. Breathing exercises to aid in word finding/mindfulness     Patient verbalized understanding to all above education provided.     Home program established:  not yet established  Exercises were reviewed and Peña was able to demonstrate them prior to the end of the session.  Peña demonstrated good  understanding of the education provided.     See Electronic Medical Record under Patient Instructions for exercises provided throughout therapy.  Assessment:   Peña is progressing well towards his goals. See Plan of Care update. Current goals remain appropriate. Goals to be updated as necessary.     Patient prognosis is Good. Patient will continue to benefit from skilled outpatient speech and language therapy to address the deficits listed in the problem list on initial evaluation, provide patient/family education and to maximize patient's level of independence in the home and community environment.   Medical necessity is demonstrated by the following IMPAIRMENTS:  Deficits in executive functioning and memory prevent the patient performing his work and personal daily activities effectively and efficiently which may put him at risk of unsafe behavior and a decline in quality of life.  Barriers to Therapy: transportation  Patient's spiritual, cultural and educational needs considered and patient agreeable to plan of care and goals.  Plan:   Continue Plan of Care with focus  on communication deficits.     Ev Yoo CCC-SLP   10/26/2022

## 2022-10-26 NOTE — PLAN OF CARE
"[facility]  Speech Therapy Updated Plan of Care-Neurological Rehabilitation         Date: 10/26/2022   Name: Adarsh Wheeler  Clinic Number: [id number]    Therapy Diagnosis:   Encounter Diagnoses   Name Primary?    Broca's aphasia Yes    Apraxia of speech      Physician: Kush Serrano MD  Physician Orders: [id number] - AMB REFERRAL/CONSULT TO SPEECH THERAPY  Medical Diagnosis: I63.9 (ICD-10-CM) - Cerebral infarction, unspecified    Visit #/ Visits Authorized:  6 /15   Evaluation Date: 7/18/2022  Insurance Authorization Period: 6/14/12022 to 12/11/2022  Plan of Care Expiration:    10/28/2022  New POC Certification Period:  12/23/2022    Total Visits Received: 7    Precautions:Standard, Fall, and s/p stroke      Subjective     Update: Patient is making good progress within speech therapy gaining both awareness and strategies to accommodate for language deficits. Patient regularly states "this is too easy" while still needing assistance to complete tasks. Fluency has increased since beginning therapy.    Objective     Update: see follow up note dated 10/26/2022    Assessment     Update: Adarsh Wheeler presents to [facility] status post medical diagnosis of Cerebral infarction, unspecified. Demonstrates impairments including limitations as described in the problem list. Positive prognostic factors include patients motivation to improve communication. Negative prognostic factors include complex medical hisory. He presents with moderate Broca's aphasia and apraxia of speech characterized by deficits across all domains of language and  blocks with articulatory groping and inconsistent consonant and vowel distortions.  Barriers to therapy include transportation . Patient will benefit from skilled, outpatient rehabilitation speech therapy.     Rehab Potential: good   Pt's spiritual, cultural, and educational needs considered and patient agreeable to plan of care and goals.   "   Education: Plan of Care, role of SLP in care, scheduling/ cancellation policy, and insurance limitations / visit limit      Previous Short Term Goals Status:    Short Term Goals: (4 weeks) Current Progress:   1. Pt will complete Verb Network Strengthening Training for 4 verbs per session given minimal cues.     Progressing/ Not Met 10/26/2022   Progressing/Not Met    2. Pt will follow attentive reading and constraint summarization with moderate cues for story retell with given constraints.     Progressing/ Not Met 10/26/2022   Progressing/Not Met       3. Pt will follow complex multiunit commands with 90% accuracy independently.     Progressing/ Not Met 10/26/2022   Progressing/Not Met          4. Pt will answer complex multiunit yes/no questions with 90% accuracy independently.      Completed with 100% accuracy independently.      Goal Met 10/26/2022     5. Pt will complete verbal reasoning tasks with minimal cues for accuracy.      Progressing/ Not Met 10/26/2022   Progressing/Not Met       6. Pt will produce bisyllabic syllabic words with word initial /s/ with 85% acc across 3 probe trials.  DISCONTINUE   7. Pt will produce bisyllabic syllabic words with word initial /r/ with 85% acc across 3 probe trials.    DISCONTINUE       New Short Term Goals:    Short Term Goals: (4 weeks) Current Progress:   1. Pt will complete Verb Network Strengthening Training for 4 verbs per session given minimal cues.     Progressing/ Not Met 10/26/2022   Progressing/Not Met    2. Pt will follow attentive reading and constraint summarization with moderate cues for story retell with given constraints.     Progressing/ Not Met 10/26/2022   Progressing/Not Met       3. Pt will follow complex multiunit commands with 90% accuracy independently.     Progressing/ Not Met 10/26/2022   Progressing/Not Met          5. Pt will complete verbal reasoning tasks with minimal cues for accuracy.      Progressing/ Not Met 10/26/2022   Progressing/Not  Met       6. Patient will complete Combined Aphasia and Apraxia of Speech Treatment for 5 verbs independently. GOAL ADDED 10/26/2022         Long Term Goal Status:  12 weeks  1. He  will develop functional cognitive-linguistic based skills and utilize compensatory strategies to communicate wants and needs effectively to different conversational partners, maintain safety, and participate socially in functional living environment.  -Progressing/Not Met 10/26/2022   2. Pt will develop functional motor programming, articulatory proficiency and utilize compensatory strategies to express wants and needs for intelligible speech and functional prosody in the functional living environment.-Progressing/Not Met 10/26/2022   3. Pt will comprehend communication related to basic medical and social needs and utilize compensatory strategies to maintain safety and to participate socially in functional living environment.  -Progressing/Not Met 10/26/2022     Goals Previously Met:  See bold above     Reasons for Recertification of Therapy: Patient is making progress with speech therapy and Pt continues to display deficits which warrant skilled ST services.      Plan     Updated Certification Period: 10/26/2022 to 12/23/2022    Recommended Treatment Plan: Patient will participate in the [facility] rehabilitation program for speech therapy 1 times per week to address his Communication deficits, to educate patient and their family, and to participate in a home exercise program.     Therapist's Name:  Ev Yoo CCC-SLP   10/26/2022      I CERTIFY THE NEED FOR THESE SERVICES FURNISHED UNDER THIS PLAN OF TREATMENT AND WHILE UNDER MY CARE      Physician Name: _______________________________    Physician Signature: ____________________________

## 2022-10-26 NOTE — PROGRESS NOTES
"Occupational Therapy Daily Treatment Note     Name: Adarsh Wheeler  [facility] Number: [id number]    Therapy Diagnosis:   Encounter Diagnoses   Name Primary?    Muscle weakness Yes    Lack of coordination      Physician: Stefan Woods*    Visit Date: 10/26/2022    Physician Orders: OT eval and treat   Medical Diagnosis: R53.1 (ICD-10-CM) - Right sided weakness I63.9 (ICD-10-CM) - Left-sided cerebrovascular accident (CVA)   Evaluation Date: 9/27/2022  Plan of Care Expiration Period: 12/9/2022  Insurance Authorization period Expiration: 8/12/2023  Visit # / Visits Authorized: 4 / 20  FOTO: 9/27/2022     Time In:2:05  Time Out: 2:45  Total Billable (one on one) Time: 40 minutes     Precautions: Standard and Fall    Subjective     Pt reports: "I am good. It was easy last time." "This is hard" ( when referring to exercises performed last session)   he was compliant with home exercise program given last session.   Response to previous treatment:good   Functional change: no reported change, mild improvement with posture and less depression in right scapula compared to last session, still kyphotic    Pain:0/10  Location: right arm; left arm    Objective     Physical Exam:  Postural examination/scapula alignment: Affected scapula upwardly rotated  Joint integrity: Firm end feeling  Skin integrity: intact   Edema: none present and pt does not report edema in upper extremities    Palpation: no pain with palpation      Joint Evaluation  AROM  9/27/2022 MMS   9/27/2022 AROM  9/27/2022 MMS   9/27/2022     Right Right Left Left   Scapular Elevation   5/5 WNL 5/5   Scapular Retraction   5/5 WNL 5/5   Shoulder Flex 0-180 109 3-/5   5/5   Shoulder Extension 0-80 59 3-/5   5/5   Shoulder Abd 0-180 125 3-/5   5/5   Shoulder ER 0-90 56 3-/5   5/5   Shoulder IR 0-90 S2 3-/5   5/5   Shoulder Horizontal adduction 0-90 35 3-/5   5/5   Elbow Flex/Ext 0-150 130/0 3/5   5/5   Wrist Flex 0-80 65 3-/5   5/5   Wrist Ext 0-70 50 3-/5   " 5/5   Supination 0-80 WNL 3/5   5/5   Pronation 0-80 WNL 3/5   5/5   Ulnar Deviation   3/5   5/5   Radial Deviation    3/5   5/5   *WNL - Within Normal Limits  *NT = Not Tested     Fist: normal      Strength: (ADRIANNA Dynamometer in lbs.) Average 3 trials, Position II:       9/27/2022 9/27/2022   Rung II Right Left   Trial 1 34# 85#   Trial 2 39# 91#   Trial 3 40# 101#   Average 37.6# 92.3#      Normal  Average Values  Female Right Left Male: Right Left   20-29 66 lbs 62 lbs         94 lbs 86 lbs   30-39 68 lbs 64 lbs 90 lbs 82 lbs   40-49 64 lbs 62 lbs 80 lbs 74 lbs   50-59 62 lbs 57 lbs 72 lbs 65 lbs   60-69 53 lbs 51 lbs 63 lbs 56 lbs   70+ 44 lbs 42 lbs 54 lbs 48 lbs   SD = +/- 19lbs         Pinch Strength (Measured in lbs)       9/27/2022 9/27/2022     Right Left   Key Pinch 15 # 33.5 #   3pt Pinch 13 # 24 #   2pt Pinch 16 # 23 #         Fine/Gross Motor Coordination     Assessment   Right   9/27/2022 Left  9/27/2022   9 hole peg test 9 pegs in/out for time 1:30 0:34   *WNL - Within Normal Limits  *NT = Not Tested     Tone:  Modified Terra Scale:   1-  Slight increase in muscle tone, manifested by a catch and release or by minimal resistance at the end of the range of motion when the affected part(s) is moved in flexion or extension     Sensation:  Adarsh EGAN LORENZA  reports numbness on right side, due to time constraints, will assess first after        Sensation Intact  Impaired Comments    [place]  Deep Touch (6.65) []  []       Protective Sensation (4.56) []  []       Light Touch (3.61) []  []                  Other Kinesthesia  [x]  []       Temperature [x]  []       Stereognosis  []  []         Balance:   Static Sit - NORMAL: No deviations seen in posture held statically  Dynamic sit- NORMAL: No deviations seen in posture held statically  Static Stand - FAIR+: Able to take MINIMAL challenges from all directions  Dynamic stand - FAIR+: Able to take MINIMAL challenges from all directions      Endurance Deficit: moderate    Peña participated in neuromuscular re-education activities to improve: Coordination, Kinesthetic, Sense, Proprioception, and Posture for 40 minutes. The following activities were included:  - Upper Body Ergometer (UBE) L3 for 8 minutes to provide proprioceptive awareness, postural stability, strength, activity tolerance, and reciprocal patterns with bimanual coordination completed to improve use of bilateral upper extremities in order to prepare for therapeutic exercises. Cues provided as needed for postural stability/positioning  - scapular elevation with 3 second hold 2# dumbbell /retraction/posterior rotations x10  - standing  shoulder flexion/chest press/ clockwise/counter clockwise circles 2x15 1# dowel   - Glenohumeral stabilization clockwise/counter clockwise circles on wall x10 each direction   - functional reach with cone while facilitating PNF D1 and D2 patterns   - clothespins with right upper extremity using 2 pt pinch     Home Exercises and Education Provided     Education provided:   - postural and active assisted range of motion exercises   - Progress towards goals     Written Home Exercises Provided: yes.  Exercises were reviewed and Peña was able to demonstrate them prior to the end of the session.  Peña demonstrated good  understanding of the HEP provided.   .   See EMR under Patient Instructions for exercises provided 10/5/2022.        Assessment     Peña tolerated today's session well. Interventions focused on neuromuscular re-education in right upper extremity, coordination and strength to improve safety at home. Noted improved active range of motion in right shoulder using 1# dowel while performing exercises standing. He continues to exhibit kyphotic posture but mild improvement with right scapular positioning compared to previous session. Therapeutic activity completed to facilitate PNF D1 and D2 patterns with right upper extremity to improve coordination and  normal movement patterns for self care. Good stability and shoulder flexion >90* with functional reach. Peña is motivated to participate     Peña is progressing well towards his goals and there are no updates to goals at this time. Pt prognosis is Good.     Pt will continue to benefit from skilled outpatient occupational therapy to address the deficits listed in the problem list on initial evaluation provide pt/family education and to maximize pt's level of independence in the home and community environment.     Anticipated barriers to occupational therapy: none    Pt's spiritual, cultural and educational needs considered and pt agreeable to plan of care and goals.    Goals:   Short Term Goals: 5 weeks  - Pt will be independent with Home Exercise Program (HEP)/Home Activity Program (HAP) to promote long term maintenance of progress made in therapy. Not met, progressing  - Pt will demonstrate right shoulder flexion/abduction to 140* or better in order to improve participation with reaching tasks Not met, progressing  - Pt will verbalize understanding of sensory strategies to improve input to right upper extremity and increase safety with daily tasks. Not met, progressing  - Pt will increase right  strength to 45# or better in order to increase ease with housekeeping and cooking tasks. Not met, progressing  - Pt will improve right key/3pt/2pt pinch strength to 20#/15#/17# in order to improve ease with buttons, zippers, opening containers, etc Not met, progressing     Long Term Goals: 10 weeks  - Pt will improve FOTO score by 5 points or better in order to demonstrate improved use of right upper extremity with daily tasks Not met, progressing  - Pt will improve right manual muscle strength to 4/5 or better in all planes in order to return to prior level of function Not met, progressing  - Pt will increase right  strength to 60# or better in order to increase ease with housekeeping and cooking tasks. Not met,  progressing  - Pt will improve right key/3pt/2pt pinch strength to 25#/18#/18# in order to improve ease with buttons, zippers, opening containers, etc  Not met, progressing  - Pt will complete 9 hole peg assessment in 1 minute or less using right upper extremity in order to improve dexterity and gross motor coordination during dressing and leisure tasks Not met, progressing  - Pt will manage various sized containers with increased ease using bilateral upper extremities and adaptive equipment as needed in order to improve independence with cooking tasks. Not met, progressing  - Pt will use right upper extremity to place 5 objects of various weight on overhead shelf in order to improve safety and ease with household chores Not met, progressing  - Pt will report increased participation and independence with daily tasks using right upper extremity Not met, progressing    Plan     Updates/Grading for next session: continue with POC Corinne Rapier, OT   10/26/2022

## 2022-10-29 ENCOUNTER — PATIENT MESSAGE (OUTPATIENT)
Dept: VASCULAR SURGERY | Facility: CLINIC | Age: 57
End: 2022-10-29
Payer: OTHER GOVERNMENT

## 2022-11-02 ENCOUNTER — CLINICAL SUPPORT (OUTPATIENT)
Dept: REHABILITATION | Facility: HOSPITAL | Age: 57
End: 2022-11-02
Payer: OTHER GOVERNMENT

## 2022-11-02 DIAGNOSIS — R26.9 GAIT ABNORMALITY: ICD-10-CM

## 2022-11-02 DIAGNOSIS — R47.01 BROCA'S APHASIA: Primary | ICD-10-CM

## 2022-11-02 DIAGNOSIS — M62.81 MUSCLE WEAKNESS: Primary | ICD-10-CM

## 2022-11-02 DIAGNOSIS — R48.2 APRAXIA OF SPEECH: ICD-10-CM

## 2022-11-02 DIAGNOSIS — R29.898 RIGHT LEG WEAKNESS: Primary | ICD-10-CM

## 2022-11-02 DIAGNOSIS — R27.9 LACK OF COORDINATION: ICD-10-CM

## 2022-11-02 PROCEDURE — 92507 TX SP LANG VOICE COMM INDIV: CPT | Mod: PN

## 2022-11-02 PROCEDURE — 97112 NEUROMUSCULAR REEDUCATION: CPT | Mod: PN

## 2022-11-02 PROCEDURE — 97110 THERAPEUTIC EXERCISES: CPT | Mod: PN,CQ

## 2022-11-02 PROCEDURE — 97112 NEUROMUSCULAR REEDUCATION: CPT | Mod: PN,CQ

## 2022-11-02 NOTE — PROGRESS NOTES
"Occupational Therapy Daily Treatment Note     Name: Adarsh Wheeler  [facility] Number: [id number]    Therapy Diagnosis:   Encounter Diagnoses   Name Primary?    Muscle weakness Yes    Lack of coordination        Physician: Stefan Woods*    Visit Date: 11/2/2022    Physician Orders: OT eval and treat   Medical Diagnosis: R53.1 (ICD-10-CM) - Right sided weakness I63.9 (ICD-10-CM) - Left-sided cerebrovascular accident (CVA)   Evaluation Date: 9/27/2022  Plan of Care Expiration Period: 12/9/2022  Insurance Authorization period Expiration: 8/12/2023  Visit # / Visits Authorized: 5 / 20  FOTO: 9/27/2022     Time In: 1:15  Time Out: 2:00  Total Billable (one on one) Time: 45 minutes     Precautions: Standard and Fall    Subjective     Pt reports: "I have some pain in my trapezoid muscle (upper trapezius m)" "It feels achy. I have had this a long time"  he was compliant with home exercise program given last session.   Response to previous treatment:good   Functional change: no reported change, mild improvement with posture and less depression in right scapula compared to last session, still kyphotic    Pain: 5/10  Location: upper trap    Objective     Physical Exam:  Postural examination/scapula alignment: Affected scapula upwardly rotated  Joint integrity: Firm end feeling  Skin integrity: intact   Edema: none present and pt does not report edema in upper extremities    Palpation: no pain with palpation      Joint Evaluation  AROM  9/27/2022 MMS   9/27/2022 AROM  11/2/2022 MMS  11/2/2022     Right Right Right Right    Shoulder Flex 0-180 109 3-/5 120 3+/5   Shoulder Extension 0-80 59 3-/5 70 3+/5   Shoulder Abd 0-180 125 3-/5 111 4-/5   Shoulder ER 0-90 56 3-/5 60 3+/5   Shoulder IR 0-90 S2 3-/5 L2 4/5   Shoulder Horizontal adduction 0-90 35 3-/5 50 4/5   Elbow Flex/Ext 0-150 130/0 3/5  4/5   Wrist Flex 0-80 65 3-/5  4+/5   Wrist Ext 0-70 50 3-/5  4+/5   Supination 0-80 WNL 3/5  5/5   Pronation 0-80 WNL 3/5  5/5 "   Ulnar Deviation   3/5  5/5   Radial Deviation    3/5  5/5   *WNL - Within Normal Limits  *NT = Not Tested     Fist: normal      Strength: (ADRIANNA Dynamometer in lbs.) Average 3 trials, Position II:       9/27/2022 9/27/2022 11/2/2022   Rung II Right Left Right    Trial 1 34# 85# 44#   Trial 2 39# 91# 41#   Trial 3 40# 101# 43#   Average 37.6# 92.3# 42#      Normal  Average Values  Female Right Left Male: Right Left   20-29 66 lbs 62 lbs         94 lbs 86 lbs   30-39 68 lbs 64 lbs 90 lbs 82 lbs   40-49 64 lbs 62 lbs 80 lbs 74 lbs   50-59 62 lbs 57 lbs 72 lbs 65 lbs   60-69 53 lbs 51 lbs 63 lbs 56 lbs   70+ 44 lbs 42 lbs 54 lbs 48 lbs   SD = +/- 19lbs         Pinch Strength (Measured in lbs)       9/27/2022 9/27/2022 11/2/2022     Right Left Right    Key Pinch 15 # 33.5 # 14#   3pt Pinch 13 # 24 # 11#   2pt Pinch 16 # 23 # 14#         Fine/Gross Motor Coordination     Assessment   Right   9/27/2022 Left  9/27/2022 Right  11/2/2022   9 hole peg test 9 pegs in/out for time 1:30 0:34 1:22   *WNL - Within Normal Limits  *NT = Not Tested     Tone:  Modified Terra Scale:   1-  Slight increase in muscle tone, manifested by a catch and release or by minimal resistance at the end of the range of motion when the affected part(s) is moved in flexion or extension     Sensation:  Adarsh EGAN III  reports numbness on right side       Sensation Intact  Impaired Comments    Pullman Debra  Deep Touch (6.65) [x]  []       Protective Sensation (4.56) [x]  []       Light Touch (3.61) []  [x]  5/7 trials detected, difficulty with median nerve innervation                 Other Kinesthesia  [x]  []       Temperature [x]  []       Stereognosis  []  []         Balance:   Static Sit - NORMAL: No deviations seen in posture held statically  Dynamic sit- NORMAL: No deviations seen in posture held statically  Static Stand - FAIR+: Able to take MINIMAL challenges from all directions  Dynamic stand - FAIR+: Able to take MINIMAL challenges  from all directions     Endurance Deficit: moderate    Peña participated in neuromuscular re-education activities to improve: Coordination, Kinesthetic, Sense, Proprioception, and Posture for 40 minutes. The following activities were included:  - Upper Body Ergometer (UBE) L3 for 8 minutes to provide proprioceptive awareness, postural stability, strength, activity tolerance, and reciprocal patterns with bimanual coordination completed to improve use of bilateral upper extremities in order to prepare for therapeutic exercises. Cues provided as needed for postural stability/positioning  - Reassessment + FOTO    Home Exercises and Education Provided     Education provided:   - postural and active assisted range of motion exercises   - Progress towards goals     Written Home Exercises Provided: yes.  Exercises were reviewed and Peña was able to demonstrate them prior to the end of the session.  Peña demonstrated good  understanding of the HEP provided.   .   See EMR under Patient Instructions for exercises provided 10/5/2022.     Assessment     Peña tolerated today's session well. He reported increased pain in his right shoulder (5/10), moist heat was applied to prepare for session and promote blood flow. He reported the shoulder pain is chronic. Reassessment performed and Peña exhibits improved manual muscle strength in right upper extremity, increased active range of motion and mild improvement with fine motor coordination. Discussed goals and will provide home exercise program next session to continue active range of motion gains. Future sessions will focus on gross motor coordination, fine motor coordination and functional activities with right upper extremity. Overall, steady progress towards goals.     Peña is progressing well towards his goals and there are no updates to goals at this time. Pt prognosis is Good.     Pt will continue to benefit from skilled outpatient occupational therapy to address the deficits  listed in the problem list on initial evaluation provide pt/family education and to maximize pt's level of independence in the home and community environment.     Anticipated barriers to occupational therapy: none    Pt's spiritual, cultural and educational needs considered and pt agreeable to plan of care and goals.    Goals:   Short Term Goals: 5 weeks  - Pt will be independent with Home Exercise Program (HEP)/Home Activity Program (HAP) to promote long term maintenance of progress made in therapy. Not met, progressing  - Pt will demonstrate right shoulder flexion/abduction to 140* or better in order to improve participation with reaching tasks Not met, progressing  - Pt will verbalize understanding of sensory strategies to improve input to right upper extremity and increase safety with daily tasks. Not met, progressing  - Pt will increase right  strength to 45# or better in order to increase ease with housekeeping and cooking tasks. Not met, progressing  - Pt will improve right key/3pt/2pt pinch strength to 20#/15#/17# in order to improve ease with buttons, zippers, opening containers, etc Not met, progressing     Long Term Goals: 10 weeks  - Pt will improve FOTO score by 5 points or better in order to demonstrate improved use of right upper extremity with daily tasks Not met, progressing  - Pt will improve right manual muscle strength to 4/5 or better in all planes in order to return to prior level of function Not met, progressing  - Pt will increase right  strength to 60# or better in order to increase ease with housekeeping and cooking tasks. Not met, progressing  - Pt will improve right key/3pt/2pt pinch strength to 25#/18#/18# in order to improve ease with buttons, zippers, opening containers, etc  Not met, progressing  - Pt will complete 9 hole peg assessment in 1 minute or less using right upper extremity in order to improve dexterity and gross motor coordination during dressing and leisure tasks  Not met, progressing  - Pt will manage various sized containers with increased ease using bilateral upper extremities and adaptive equipment as needed in order to improve independence with cooking tasks. Not met, progressing  - Pt will use right upper extremity to place 5 objects of various weight on overhead shelf in order to improve safety and ease with household chores Not met, progressing  - Pt will report increased participation and independence with daily tasks using right upper extremity Not met, progressing    Plan     Updates/Grading for next session: continue with POC    Corinne Rapier, OT   11/2/2022

## 2022-11-02 NOTE — PROGRESS NOTES
[facility]  Speech Therapy Treatment Note- Neurological Rehabilitation    Date: 11/2/2022     Name: Adarsh Wheeler   MRN: [id number]   Therapy Diagnosis:   Encounter Diagnoses   Name Primary?    Broca's aphasia Yes    Apraxia of speech      Physician: Kush Serrano MD  Physician Orders: [id number] - AMB REFERRAL/CONSULT TO SPEECH THERAPY  Medical Diagnosis: I63.9 (ICD-10-CM) - Cerebral infarction, unspecified    Visit #/ Visits Authorized: 7/ 15  Date of Evaluation:  7/18/2022   Insurance Authorization Period:  6/14/22- 12/11/22   Plan of Care Expiration Date:    9/12/22, 10/28/2022, 12/23/2022  Extended Plan of Care:  yes, on 10/26/2022  Progress Note: 11/26/2022  Visits Cancelled: 0  Visits No Show: 0    Time In:  1400  Time Out:  1445  Total Billable Time: 45 minutes     Precautions: Standard  Subjective:   Patient reports: He feels challenged here and is noting progress.  He was compliant to home exercise program.   Response to previous treatment: improvement in word finding and verbal fluency   Pain Scale:  0/10 on a Visual Analog Scale currently.   Pain Location: no pain indicated throughout session  Objective:        UNTIMED  Procedure Min.   Speech- Language- Voice Therapy  40   Total Timed Units: 0  Total Untimed Units: 1  Charges Billed/Number of units: 1    Short Term Goals: (4 weeks) Current Progress:   1. Pt will complete Verb Network Strengthening Training for 4 verbs per session given minimal cues.     Progressing/ Not Met 11/2/2022    Not addressed in today's session.    2. Pt will follow attentive reading and constraint summarization with moderate cues for story retell with given constraints.     Progressing/ Not Met 11/2/2022    Completed today for 2 paragraphs with moderate cues for story recall given constraints.    Met x1   3. Pt will follow complex multiunit commands with 90% accuracy independently.     Progressing/ Not Met 11/2/2022    Not addressed in today's session.     5. Pt will complete verbal reasoning tasks with minimal cues for accuracy.      Progressing/ Not Met 2022    Not addressed in today's session.    6. Patient will complete Combined Aphasia and Apraxia of Speech Treatment for 5 verbs independently.    Progressing/Not Met 2022  Completed for 5 verbs with minimal cues and significantly extended time.         Patient Education/Response:   Patient educated regarding the followin. Plan of Care update  2. Progress within therapy    Patient verbalized understanding to all above education provided.     Home program established:  not yet established  Exercises were reviewed and Peña was able to demonstrate them prior to the end of the session.  Peña demonstrated good  understanding of the education provided.     See Electronic Medical Record under Patient Instructions for exercises provided throughout therapy.  Assessment:   Peña is progressing well towards his goals. Excellent improvement in verbal expression complexity and with implementation of strategies for apraxia. High level paragraphs utilized and Combined Aphasia and Apraxia of Speech Treatment introduced today with good feedback. Excellent tolerance of tasks, but additional time required. . Current goals remain appropriate. Goals to be updated as necessary.     Patient prognosis is Good. Patient will continue to benefit from skilled outpatient speech and language therapy to address the deficits listed in the problem list on initial evaluation, provide patient/family education and to maximize patient's level of independence in the home and community environment.   Medical necessity is demonstrated by the following IMPAIRMENTS:  Deficits in executive functioning and memory prevent the patient performing his work and personal daily activities effectively and efficiently which may put him at risk of unsafe behavior and a decline in quality of life.  Barriers to Therapy: transportation  Patient's  spiritual, cultural and educational needs considered and patient agreeable to plan of care and goals.  Plan:   Continue Plan of Care with focus on communication deficits.     Ev Yoo CCC-SLP   11/2/2022

## 2022-11-03 NOTE — PROGRESS NOTES
"    [facility]   Physical Therapy Treatment Note     Name: Adarsh Wheeler  Clinic Number: [id number]    Therapy Diagnosis:   Encounter Diagnoses   Name Primary?    Right leg weakness Yes    Gait abnormality      Physician: Franco Thompson MD    Visit Date: 11/2/2022    Physician: ROSANNA Servin III, MD     Physician Orders: PT Eval and Treat   Medical Diagnosis from Referral:   Z98.890 (ICD-10-CM) - S/P carotid endarterectomy   I63.9 (ICD-10-CM) - Left-sided cerebrovascular accident (CVA)   Evaluation Date: 10/12/2022  Authorization Period Expiration: 9/28/2023  Plan of Care Expiration: 12/9/2022  Visit # / Visits authorized: 3/15 (+eval)  FOTO #: 2/5  PTA Visit #: 2/5     Time In: 2:45 PM  Time Out: 3:30 PM  Total Billable Time: 45 minutes (2 NMR + 1 TE)    SUBJECTIVE     Pt reports: no complaints of pain at this time, nothing new to report.  He was compliant with home exercise program.  Response to previous treatment: mild soreness  Functional change: ongoing    Pain: 0/10  Location: NA      OBJECTIVE     Objective Measures updated at progress report unless specified.     Treatment     Peña received the treatments listed below:      therapeutic exercises to develop strength, endurance, ROM and flexibility for 20 minutes including:  - reciprocal stepper bike x 8 minutes level 4 single peak setting  - standing gastroc stretch 3x30"B  - standing heel-toe raises off green foam oval 2x15  - staggered sit to stands from chair height with 10# 2x15  - standing hip abduction with stance leg on green foam disc 2x15B green theraband  - step up on bosu with contralateral hip/knee flexion: x10 B with UE support  - side lunges on sliders: 2x10 B with UE support      Not performed today:  - standing lunges to bosu: x10 B forward with single upper extremity support                                            : x10 B lateral with single upper extremity support     neuromuscular re-education " "activities to improve: Balance, Coordination, Kinesthetic, Sense and Proprioception for 25 minutes. The following activities were included:  - semi tandem stance on bosu 2x10 B  - reciprocal step ups on soft fitter board with red med ball overhead press: 2x10  - reciprocal cone taps from soft fitter board: 3x30  - single leg crossover cone taps from soft fitter board: 2x30" B  - lateral steps on airex beam: 4 lengths x 10 feet  - lateral steps on airex beam with hurdles (4): 4 lengths x 10 feet  - tandem walking solid ground: 4 lengths x 10 feet    Not performed today:  - semi-tandem stance on 6" step + red med ball chest press x10B  - tandem walking with fingertip support 4 lengths x 10 feet each  - lateral walks + cone taps with minimal upper extremity support x 5 lengths x 10 feet each  - stand <> half kneel transfers x 5B with left upper extremity support      Patient Education and Home Exercises     Home Exercises Provided and Patient Education Provided     Education provided:   - daily HEP compliance    Written Home Exercises Provided: Patient instructed to cont prior HEP. Exercises were reviewed and Peña was able to demonstrate them prior to the end of the session.  Peña demonstrated good  understanding of the education provided. See EMR under Patient Instructions for exercises provided during therapy sessions    ASSESSMENT     Peña arrived to session without any complaints of pain and was agreeable to treatment.  Session consisted of moderate level balance training and BLE strengthening with a focus on R sided weight bearing.  Good overall tolerance of session with only one seated rest break utilized for fatigue recovery. Fingertip upper extremity support utilized as needed with dynamic balance to achieve the appropriate challenge level.  One loss of balance posteriorly during step ups on the soft board that required assistance from PTA for recovery.  He would benefit from continued PT services to improve " balance deficits and improve overall functional mobility.         Peña Is progressing well towards his goals.   Pt prognosis is Excellent.     Pt will continue to benefit from skilled outpatient physical therapy to address the deficits listed in the problem list box on initial evaluation, provide pt/family education and to maximize pt's level of independence in the home and community environment.     Pt's spiritual, cultural and educational needs considered and pt agreeable to plan of care and goals.     Anticipated barriers to physical therapy: Chronicity of impairments     Goals: Short Term Goals: 4 weeks   Patient will be compliant with HEP in order to maximize PT benefits  Patient will improve bilateral lower extremity MMT grades by >/=1/2 grade in order to improve strength for ADL completion  Patient will score >/= 23/30 on FGA in order to reduce risk for falls and improve postural control     Long Term Goals: 8 weeks   Patient will improve bilateral lower extremity MMT grades by >/=1 grade in order to improve strength for ADL completion  Patient will score >/= 25/30 on FGA in order to reduce risk for falls and improve postural control  Patient will score >/= 14 repetitions on 30-Second Sit to  order to improve BLE endurance and muscular power for transfers   Patient will perform 1 floor <> stand transfer with bilateral upper extremity support in order to demonstrate safe functional mobility in case of future fall  Patient will report 0 falls from initiation of PT management  Patient will begin some form of home/community fitness in order to sustain progress gained in PT    PLAN     Plan to cont with progression of PT goals per POC.    Lesvia Laughlin, PTA

## 2022-11-08 NOTE — TELEPHONE ENCOUNTER
Contacted pt's sister Elena in response to message. States she was told by billing dept that if orders for PT/OT/ST are not received by billing dept pt would be responsible for 100% of therapy. Nurse contacted billing dept to discuss and contacted pt's sister with response. Not[facility] sister [facility]  and that new authorization must be received so that therapy can be paid for. Notified sister that she may call [facility] herself with authorization number from [facility] or that this can be received electronically (such as fax) directly from [facility] to [facility] billing dept. Sister verbalized understanding and states she will contact [facility] to request authorization.

## 2022-11-09 ENCOUNTER — CLINICAL SUPPORT (OUTPATIENT)
Dept: REHABILITATION | Facility: HOSPITAL | Age: 57
End: 2022-11-09
Payer: OTHER GOVERNMENT

## 2022-11-09 ENCOUNTER — CLINICAL SUPPORT (OUTPATIENT)
Dept: REHABILITATION | Facility: HOSPITAL | Age: 57
End: 2022-11-09
Attending: INTERNAL MEDICINE
Payer: OTHER GOVERNMENT

## 2022-11-09 DIAGNOSIS — R47.01 BROCA'S APHASIA: Primary | ICD-10-CM

## 2022-11-09 DIAGNOSIS — M62.81 MUSCLE WEAKNESS: Primary | ICD-10-CM

## 2022-11-09 DIAGNOSIS — R29.898 RIGHT LEG WEAKNESS: Primary | ICD-10-CM

## 2022-11-09 DIAGNOSIS — R26.9 GAIT ABNORMALITY: ICD-10-CM

## 2022-11-09 DIAGNOSIS — R48.2 APRAXIA OF SPEECH: ICD-10-CM

## 2022-11-09 DIAGNOSIS — R27.9 LACK OF COORDINATION: ICD-10-CM

## 2022-11-09 PROCEDURE — 97112 NEUROMUSCULAR REEDUCATION: CPT | Mod: PN,CQ

## 2022-11-09 PROCEDURE — 97530 THERAPEUTIC ACTIVITIES: CPT | Mod: PN,59

## 2022-11-09 PROCEDURE — 92507 TX SP LANG VOICE COMM INDIV: CPT | Mod: PN

## 2022-11-09 PROCEDURE — 97112 NEUROMUSCULAR REEDUCATION: CPT | Mod: PN

## 2022-11-09 PROCEDURE — 97110 THERAPEUTIC EXERCISES: CPT | Mod: PN,CQ

## 2022-11-09 NOTE — PROGRESS NOTES
"Occupational Therapy Daily Treatment Note     Name: Adarsh Wheeler  [facility] Number: [id number]    Therapy Diagnosis:   Encounter Diagnoses   Name Primary?    Muscle weakness Yes    Lack of coordination        Physician: Stefan Woods*    Visit Date: 11/9/2022    Physician Orders: OT eval and treat   Medical Diagnosis: R53.1 (ICD-10-CM) - Right sided weakness I63.9 (ICD-10-CM) - Left-sided cerebrovascular accident (CVA)   Evaluation Date: 9/27/2022  Plan of Care Expiration Period: 12/9/2022  Insurance Authorization period Expiration: 11/30/2022  Visit # / Visits Authorized: 6 / 20  FOTO: 9/27/2022     Time In: 2:00  Time Out: 2:45  Total Billable (one on one) Time: 45 minutes     Precautions: Standard and Fall    Subjective     Pt reports: "I want these (pointing to fingers) to work better"  he was compliant with home exercise program given last session.   Response to previous treatment:good   Functional change: no major changes reported    Pain: 0/10  Location: right upper extremity     Objective     Physical Exam:  Postural examination/scapula alignment: Affected scapula upwardly rotated  Joint integrity: Firm end feeling  Skin integrity: intact   Edema: none present and pt does not report edema in upper extremities    Palpation: no pain with palpation      Joint Evaluation  AROM  9/27/2022 MMS   9/27/2022 AROM  11/2/2022 MMS  11/2/2022     Right Right Right Right    Shoulder Flex 0-180 109 3-/5 120 3+/5   Shoulder Extension 0-80 59 3-/5 70 3+/5   Shoulder Abd 0-180 125 3-/5 111 4-/5   Shoulder ER 0-90 56 3-/5 60 3+/5   Shoulder IR 0-90 S2 3-/5 L2 4/5   Shoulder Horizontal adduction 0-90 35 3-/5 50 4/5   Elbow Flex/Ext 0-150 130/0 3/5  4/5   Wrist Flex 0-80 65 3-/5  4+/5   Wrist Ext 0-70 50 3-/5  4+/5   Supination 0-80 WNL 3/5  5/5   Pronation 0-80 WNL 3/5  5/5   Ulnar Deviation   3/5  5/5   Radial Deviation    3/5  5/5   *WNL - Within Normal Limits  *NT = Not Tested     Fist: normal      Strength: " (ADRIANNA Dynamometer in lbs.) Average 3 trials, Position II:       9/27/2022 9/27/2022 11/2/2022   Rung II Right Left Right    Trial 1 34# 85# 44#   Trial 2 39# 91# 41#   Trial 3 40# 101# 43#   Average 37.6# 92.3# 42#      Normal  Average Values  Female Right Left Male: Right Left   20-29 66 lbs 62 lbs         94 lbs 86 lbs   30-39 68 lbs 64 lbs 90 lbs 82 lbs   40-49 64 lbs 62 lbs 80 lbs 74 lbs   50-59 62 lbs 57 lbs 72 lbs 65 lbs   60-69 53 lbs 51 lbs 63 lbs 56 lbs   70+ 44 lbs 42 lbs 54 lbs 48 lbs   SD = +/- 19lbs         Pinch Strength (Measured in lbs)       9/27/2022 9/27/2022 11/2/2022     Right Left Right    Key Pinch 15 # 33.5 # 14#   3pt Pinch 13 # 24 # 11#   2pt Pinch 16 # 23 # 14#         Fine/Gross Motor Coordination     Assessment   Right   9/27/2022 Left  9/27/2022 Right  11/2/2022   9 hole peg test 9 pegs in/out for time 1:30 0:34 1:22   *WNL - Within Normal Limits  *NT = Not Tested     Tone:  Modified Terra Scale:   1-  Slight increase in muscle tone, manifested by a catch and release or by minimal resistance at the end of the range of motion when the affected part(s) is moved in flexion or extension     Sensation:  Adarsh EGAN III  reports numbness on right side       Sensation Intact  Impaired Comments    [place]  Deep Touch (6.65) [x]  []       Protective Sensation (4.56) [x]  []       Light Touch (3.61) []  [x]  5/7 trials detected, difficulty with median nerve innervation                 Other Kinesthesia  [x]  []       Temperature [x]  []       Stereognosis  []  []         Balance:   Static Sit - NORMAL: No deviations seen in posture held statically  Dynamic sit- NORMAL: No deviations seen in posture held statically  Static Stand - FAIR+: Able to take MINIMAL challenges from all directions  Dynamic stand - FAIR+: Able to take MINIMAL challenges from all directions     Endurance Deficit: moderate    Peña participated in neuromuscular re-education activities to improve: Coordination,  Kinesthetic, Sense, Proprioception, and Posture for 8 minutes. The following activities were included:  - Upper Body Ergometer (UBE) L3 for 8 minutes to provide proprioceptive awareness, postural stability, strength, activity tolerance, and reciprocal patterns with bimanual coordination completed to improve use of bilateral upper extremities in order to prepare for therapeutic exercises. Cues provided as needed for postural stability/positioning    Peña participated in dynamic functional therapeutic activities to improve functional performance for 37  minutes, including:  - wrist maze x2 minutes  - wrist proprioceptive  x2 minutes (upper extremity supported at elbow) - 2 stacks in 2 minutes   - in hand manipulation with spheres (forearm pronated) x2 minutes   - pegs in/out peg board; 37# resistance gripper to remove   - velcro board up/down x5     Home Exercises and Education Provided     Education provided:   - postural and active assisted range of motion exercises   - Progress towards goals     Written Home Exercises Provided: yes.  Exercises were reviewed and Peña was able to demonstrate them prior to the end of the session.  Peña demonstrated good  understanding of the HEP provided.   .   See EMR under Patient Instructions for exercises provided 10/5/2022.     Assessment     Peña tolerated today's session well. Interventions focused on functional use of right upper extremity with therapeutic activities to increase use with daily tasks. He exhibited muscle fatigue with use and therapist educated on preventing fatigue in order to prevent compensatory movements. Peña was able to implement rest breaks with remaining activities and noted improvement with normal muscle patterns. increased time for all tasks.     Peña is progressing well towards his goals and there are no updates to goals at this time. Pt prognosis is Good.     Pt will continue to benefit from skilled outpatient occupational therapy to address  the deficits listed in the problem list on initial evaluation provide pt/family education and to maximize pt's level of independence in the home and community environment.     Anticipated barriers to occupational therapy: none    Pt's spiritual, cultural and educational needs considered and pt agreeable to plan of care and goals.    Goals:   Short Term Goals: 5 weeks  - Pt will be independent with Home Exercise Program (HEP)/Home Activity Program (HAP) to promote long term maintenance of progress made in therapy. Not met, progressing  - Pt will demonstrate right shoulder flexion/abduction to 140* or better in order to improve participation with reaching tasks Not met, progressing  - Pt will verbalize understanding of sensory strategies to improve input to right upper extremity and increase safety with daily tasks. Not met, progressing  - Pt will increase right  strength to 45# or better in order to increase ease with housekeeping and cooking tasks. Not met, progressing  - Pt will improve right key/3pt/2pt pinch strength to 20#/15#/17# in order to improve ease with buttons, zippers, opening containers, etc Not met, progressing     Long Term Goals: 10 weeks  - Pt will improve FOTO score by 5 points or better in order to demonstrate improved use of right upper extremity with daily tasks Not met, progressing  - Pt will improve right manual muscle strength to 4/5 or better in all planes in order to return to prior level of function Not met, progressing  - Pt will increase right  strength to 60# or better in order to increase ease with housekeeping and cooking tasks. Not met, progressing  - Pt will improve right key/3pt/2pt pinch strength to 25#/18#/18# in order to improve ease with buttons, zippers, opening containers, etc  Not met, progressing  - Pt will complete 9 hole peg assessment in 1 minute or less using right upper extremity in order to improve dexterity and gross motor coordination during dressing and  leisure tasks Not met, progressing  - Pt will manage various sized containers with increased ease using bilateral upper extremities and adaptive equipment as needed in order to improve independence with cooking tasks. Not met, progressing  - Pt will use right upper extremity to place 5 objects of various weight on overhead shelf in order to improve safety and ease with household chores Not met, progressing  - Pt will report increased participation and independence with daily tasks using right upper extremity Not met, progressing    Plan     Updates/Grading for next session: continue with POC Corinne Rapier, OT   11/9/2022

## 2022-11-09 NOTE — PROGRESS NOTES
"    [facility]   Physical Therapy Treatment Note     Name: Adarsh Wheeler  Clinic Number: [id number]    Therapy Diagnosis:   Encounter Diagnoses   Name Primary?    Right leg weakness Yes    Gait abnormality        Physician: Franco Thompson MD    Visit Date: 11/9/2022    Physician: ROSANNA Servin III, MD     Physician Orders: PT Eval and Treat   Medical Diagnosis from Referral:   Z98.890 (ICD-10-CM) - S/P carotid endarterectomy   I63.9 (ICD-10-CM) - Left-sided cerebrovascular accident (CVA)   Evaluation Date: 10/12/2022  Authorization Period Expiration: 9/28/2023  Plan of Care Expiration: 12/9/2022  Visit # / Visits authorized: 4/15 (+eval)  FOTO #: 4/5  PTA Visit #: 4/5     Time In: 2:45 PM  Time Out: 3:30 PM  Total Billable Time: 45 minutes (2 NMR + 1 TE)    SUBJECTIVE     Pt reports: no complaints of pain at this time, nothing new to report.  He was compliant with home exercise program.  Response to previous treatment: mild soreness  Functional change: ongoing    Pain: 0/10  Location: NA      OBJECTIVE     Objective Measures updated at progress report unless specified.     Treatment     Peña received the treatments listed below:      therapeutic exercises to develop strength, endurance, ROM and flexibility for 20 minutes including:  - reciprocal stepper bike x 8 minutes level 4 single peak setting  - standing gastroc stretch 3x30"B  - standing heel-toe raises off green foam oval 2x15  - staggered sit to stands from chair height with 10# 2x15  - standing hip abduction with stance leg on green foam disc 2x15B green theraband  - step up on bosu with contralateral hip/knee flexion: x10 B with UE support  - side lunges on sliders: 2x10 B with UE support      Not performed today:  - standing lunges to bosu: x10 B forward with single upper extremity support                                            : x10 B lateral with single upper extremity support     neuromuscular re-education " "activities to improve: Balance, Coordination, Kinesthetic, Sense and Proprioception for 25 minutes. The following activities were included:  - semi tandem stance on bosu 2x10 B  - reciprocal step ups on soft fitter board with red med ball overhead press: 2x10  - reciprocal cone taps from soft fitter board: 3x30  - single leg crossover cone taps from soft fitter board: 2x30" B  - lateral steps on airex beam: 4 lengths x 10 feet  - lateral steps on airex beam with hurdles (4): 4 lengths x 10 feet  - tandem walking solid ground: 4 lengths x 10 feet    Not performed today:  - semi-tandem stance on 6" step + red med ball chest press x10B  - tandem walking with fingertip support 4 lengths x 10 feet each  - lateral walks + cone taps with minimal upper extremity support x 5 lengths x 10 feet each  - stand <> half kneel transfers x 5B with left upper extremity support      Patient Education and Home Exercises     Home Exercises Provided and Patient Education Provided     Education provided:   - daily HEP compliance    Written Home Exercises Provided: Patient instructed to cont prior HEP. Exercises were reviewed and Peña was able to demonstrate them prior to the end of the session.  Peña demonstrated good  understanding of the education provided. See EMR under Patient Instructions for exercises provided during therapy sessions    ASSESSMENT     Peña arrived to session without any complaints of pain and was agreeable to treatment.  Session consisted of moderate level balance training and BLE strengthening with a focus on R sided weight bearing.  Good overall tolerance of session with only one seated rest break utilized for fatigue recovery. Fingertip upper extremity support utilized as needed with dynamic balance to achieve the appropriate challenge level.  One loss of balance posteriorly during step ups on the soft board that required assistance from PTA for recovery.  He would benefit from continued PT services to improve " balance deficits and improve overall functional mobility.         Peña Is progressing well towards his goals.   Pt prognosis is Excellent.     Pt will continue to benefit from skilled outpatient physical therapy to address the deficits listed in the problem list box on initial evaluation, provide pt/family education and to maximize pt's level of independence in the home and community environment.     Pt's spiritual, cultural and educational needs considered and pt agreeable to plan of care and goals.     Anticipated barriers to physical therapy: Chronicity of impairments     Goals: Short Term Goals: 4 weeks   Patient will be compliant with HEP in order to maximize PT benefits  Patient will improve bilateral lower extremity MMT grades by >/=1/2 grade in order to improve strength for ADL completion  Patient will score >/= 23/30 on FGA in order to reduce risk for falls and improve postural control     Long Term Goals: 8 weeks   Patient will improve bilateral lower extremity MMT grades by >/=1 grade in order to improve strength for ADL completion  Patient will score >/= 25/30 on FGA in order to reduce risk for falls and improve postural control  Patient will score >/= 14 repetitions on 30-Second Sit to  order to improve BLE endurance and muscular power for transfers   Patient will perform 1 floor <> stand transfer with bilateral upper extremity support in order to demonstrate safe functional mobility in case of future fall  Patient will report 0 falls from initiation of PT management  Patient will begin some form of home/community fitness in order to sustain progress gained in PT    PLAN     Plan to cont with progression of PT goals per POC.    Lesvia Laughlin, PTA

## 2022-11-09 NOTE — PROGRESS NOTES
[facility]  Speech Therapy Treatment Note- Neurological Rehabilitation    Date: 11/9/2022     Name: Adarsh Wheeler   MRN: [id number]   Therapy Diagnosis:   Encounter Diagnoses   Name Primary?    Broca's aphasia Yes    Apraxia of speech      Physician: Kush Serrano MD  Physician Orders: [id number] - AMB REFERRAL/CONSULT TO SPEECH THERAPY  Medical Diagnosis: I63.9 (ICD-10-CM) - Cerebral infarction, unspecified    Visit #/ Visits Authorized: 8/ 15  Date of Evaluation:  7/18/2022   Insurance Authorization Period:  6/14/22- 12/11/22   Plan of Care Expiration Date:    9/12/22, 10/28/2022, 12/23/2022  Extended Plan of Care:  yes, on 10/26/2022  Progress Note: 11/26/2022  Visits Cancelled: 0  Visits No Show: 0    Time In:  1320  Time Out:  1400  Total Billable Time: 40 minutes     Precautions: Standard  Subjective:   Patient reports: He is noting progress and states he feels he can communicate better  He was compliant to home exercise program.   Response to previous treatment: improvement in word finding and verbal fluency   Pain Scale:  0/10 on a Visual Analog Scale currently.   Pain Location: no pain indicated throughout session  Objective:        UNTIMED  Procedure Min.   Speech- Language- Voice Therapy  40   Total Timed Units: 0  Total Untimed Units: 1  Charges Billed/Number of units: 1    Short Term Goals: (4 weeks) Current Progress:   1. Pt will complete Verb Network Strengthening Training for 4 verbs per session given minimal cues.     Progressing/ Not Met 11/9/2022    Not addressed in today's session.    2. Pt will follow attentive reading and constraint summarization with moderate cues for story retell with given constraints.     Progressing/ Not Met 11/9/2022    Not addressed in today's session.     Met x1   3. Pt will follow complex multiunit commands with 90% accuracy independently.     Progressing/ Not Met 11/9/2022    Multiunit: 70% accuracy independently, 90% accuracy given minimal cues  "    Patient states this is "easy"    5. Pt will complete verbal reasoning tasks with minimal cues for accuracy.      Progressing/ Not Met 2022    Not addressed in today's session.    6. Patient will complete Combined Aphasia and Apraxia of Speech Treatment for 5 verbs independently.    Progressing/Not Met 2022  Completed for 6 verbs with minimal cues and significantly extended time.    Patient was able to speed up verbal expression          Patient Education/Response:   Patient educated regarding the followin. Combined Aphasia and Apraxia of Speech Treatment   2. Role of Speech Language Pathologist and Speech Therapy in recovery  3. Patient asked several question regarding facial droop and addressing it. Discussed literature and the presence of retraction with effort. Discussed simple massage for improved fluid drainage and patient able to return demonstration. Appearance of  droop improved following.     Patient verbalized understanding to all above education provided.     Home program established:  not yet established  Exercises were reviewed and Peña was able to demonstrate them prior to the end of the session.  Peña demonstrated good  understanding of the education provided.     See Electronic Medical Record under Patient Instructions for exercises provided throughout therapy.  Assessment:   Peña is progressing well towards his goals. Excellent improvement in verbal expression complexity and with implementation of strategies for apraxia. Speed of communication and MLU were both noted to improve following Combined Aphasia and Apraxia of Speech Treatment today. Extended time sill required, but increased independence today. Current goals remain appropriate. Goals to be updated as necessary.     Patient prognosis is Good. Patient will continue to benefit from skilled outpatient speech and language therapy to address the deficits listed in the problem list on initial evaluation, provide patient/family " education and to maximize patient's level of independence in the home and community environment.   Medical necessity is demonstrated by the following IMPAIRMENTS:  Deficits in executive functioning and memory prevent the patient performing his work and personal daily activities effectively and efficiently which may put him at risk of unsafe behavior and a decline in quality of life.  Barriers to Therapy: transportation  Patient's spiritual, cultural and educational needs considered and patient agreeable to plan of care and goals.  Plan:   Continue Plan of Care with focus on communication deficits.     Ev Yoo CCC-SLP   11/9/2022

## 2022-11-16 ENCOUNTER — CLINICAL SUPPORT (OUTPATIENT)
Dept: REHABILITATION | Facility: HOSPITAL | Age: 57
End: 2022-11-16
Attending: INTERNAL MEDICINE
Payer: OTHER GOVERNMENT

## 2022-11-16 DIAGNOSIS — R26.9 GAIT ABNORMALITY: ICD-10-CM

## 2022-11-16 DIAGNOSIS — M62.81 MUSCLE WEAKNESS: Primary | ICD-10-CM

## 2022-11-16 DIAGNOSIS — R27.9 LACK OF COORDINATION: ICD-10-CM

## 2022-11-16 DIAGNOSIS — R47.01 BROCA'S APHASIA: Primary | ICD-10-CM

## 2022-11-16 DIAGNOSIS — R48.2 APRAXIA OF SPEECH: ICD-10-CM

## 2022-11-16 DIAGNOSIS — R29.898 RIGHT LEG WEAKNESS: Primary | ICD-10-CM

## 2022-11-16 PROCEDURE — 92507 TX SP LANG VOICE COMM INDIV: CPT | Mod: PN

## 2022-11-16 PROCEDURE — 97022 WHIRLPOOL THERAPY: CPT | Mod: PN

## 2022-11-16 PROCEDURE — 97112 NEUROMUSCULAR REEDUCATION: CPT | Mod: PN,59

## 2022-11-16 PROCEDURE — 97530 THERAPEUTIC ACTIVITIES: CPT | Mod: PN,59

## 2022-11-16 PROCEDURE — 97110 THERAPEUTIC EXERCISES: CPT | Mod: PN,59

## 2022-11-16 NOTE — PROGRESS NOTES
"Occupational Therapy Daily Treatment Note     Name: Adarsh Wheeler  [facility] Number: [id number]    Therapy Diagnosis:   Encounter Diagnoses   Name Primary?    Muscle weakness Yes    Lack of coordination          Physician: Stefan Woods*    Visit Date: 11/16/2022    Physician Orders: OT eval and treat   Medical Diagnosis: R53.1 (ICD-10-CM) - Right sided weakness I63.9 (ICD-10-CM) - Left-sided cerebrovascular accident (CVA)   Evaluation Date: 9/27/2022  Plan of Care Expiration Period: 12/9/2022  Insurance Authorization period Expiration: 11/30/2022  Visit # / Visits Authorized: 7 / 20  FOTO: 9/27/2022     Time In: 12:30  Time Out: 1:15  Total Billable (one on one) Time: 45 minutes     Precautions: Standard and Fall    Subjective     Pt reports: "The strength is coming along. But I still have the lag [in my arm]"  he was compliant with home exercise program given last session.   Response to previous treatment:good   Functional change: improved strength reported with daily tasks     Pain: 0/10  Location: right upper extremity     Objective     Physical Exam:  Postural examination/scapula alignment: Affected scapula upwardly rotated  Joint integrity: Firm end feeling  Skin integrity: intact   Edema: none present and pt does not report edema in upper extremities    Palpation: no pain with palpation      Joint Evaluation  AROM  9/27/2022 MMS   9/27/2022 AROM  11/2/2022 MMS  11/2/2022     Right Right Right Right    Shoulder Flex 0-180 109 3-/5 120 3+/5   Shoulder Extension 0-80 59 3-/5 70 3+/5   Shoulder Abd 0-180 125 3-/5 111 4-/5   Shoulder ER 0-90 56 3-/5 60 3+/5   Shoulder IR 0-90 S2 3-/5 L2 4/5   Shoulder Horizontal adduction 0-90 35 3-/5 50 4/5   Elbow Flex/Ext 0-150 130/0 3/5  4/5   Wrist Flex 0-80 65 3-/5  4+/5   Wrist Ext 0-70 50 3-/5  4+/5   Supination 0-80 WNL 3/5  5/5   Pronation 0-80 WNL 3/5  5/5   Ulnar Deviation   3/5  5/5   Radial Deviation    3/5  5/5   *WNL - Within Normal Limits  *NT = Not " Tested     Fist: normal      Strength: (ADRIANNA Dynamometer in lbs.) Average 3 trials, Position II:       9/27/2022 9/27/2022 11/2/2022   Rung II Right Left Right    Trial 1 34# 85# 44#   Trial 2 39# 91# 41#   Trial 3 40# 101# 43#   Average 37.6# 92.3# 42#      Normal  Average Values  Female Right Left Male: Right Left   20-29 66 lbs 62 lbs         94 lbs 86 lbs   30-39 68 lbs 64 lbs 90 lbs 82 lbs   40-49 64 lbs 62 lbs 80 lbs 74 lbs   50-59 62 lbs 57 lbs 72 lbs 65 lbs   60-69 53 lbs 51 lbs 63 lbs 56 lbs   70+ 44 lbs 42 lbs 54 lbs 48 lbs   SD = +/- 19lbs         Pinch Strength (Measured in lbs)       9/27/2022 9/27/2022 11/2/2022     Right Left Right    Key Pinch 15 # 33.5 # 14#   3pt Pinch 13 # 24 # 11#   2pt Pinch 16 # 23 # 14#         Fine/Gross Motor Coordination     Assessment   Right   9/27/2022 Left  9/27/2022 Right  11/2/2022   9 hole peg test 9 pegs in/out for time 1:30 0:34 1:22   *WNL - Within Normal Limits  *NT = Not Tested     Tone:  Modified Terra Scale:   1-  Slight increase in muscle tone, manifested by a catch and release or by minimal resistance at the end of the range of motion when the affected part(s) is moved in flexion or extension     Sensation:  Adarsh EGAN III  reports numbness on right side       Sensation Intact  Impaired Comments    [place] Debra  Deep Touch (6.65) [x]  []       Protective Sensation (4.56) [x]  []       Light Touch (3.61) []  [x]  5/7 trials detected, difficulty with median nerve innervation                 Other Kinesthesia  [x]  []       Temperature [x]  []       Stereognosis  []  []         Balance:   Static Sit - NORMAL: No deviations seen in posture held statically  Dynamic sit- NORMAL: No deviations seen in posture held statically  Static Stand - FAIR+: Able to take MINIMAL challenges from all directions  Dynamic stand - FAIR+: Able to take MINIMAL challenges from all directions     Endurance Deficit: moderate    Fluidotherapy: To R upper extremity for 10 min,  continuous air, 110 deg, air speed 100 to decrease pain, edema & scar tissue and increased tissue extensibility.  - pt instructed on exercises to perform with right hand     Peña participated in dynamic functional therapeutic activities to improve functional performance for 35  minutes, including:  - wrist maze (yellow) x2 minutes  - wrist proprioceptive  x2 minutes (upper extremity supported at elbow) - 1.5 stacks in 2 minutes   - pom pom  with red clothespin 1 tub   - sequencing and threading beads   - functional reach activity - 5 items in/out overhead cabinet   - in hand manipulation with card activity     Home Exercises and Education Provided     Education provided:   - postural and active assisted range of motion exercises   - Progress towards goals     Written Home Exercises Provided: yes.  Exercises were reviewed and Peña was able to demonstrate them prior to the end of the session.  Peña demonstrated good  understanding of the HEP provided.   .   See EMR under Patient Instructions for exercises provided 10/5/2022.     Assessment     Peña tolerated today's session well. Interventions focused on functional use of right upper extremity with therapeutic activities to increase use with daily tasks. Fluidotherapy utilized start of session to promote sensory integration, blood flow and joint mobility to prepare for treatment. He was instructed on exercises to complete during fluido to maximize benefits. He exhibited difficulty with pom pom activity due to weakness managing red clothespin. Multiple rest breaks due to fatigue in right upper extremity. He was able to complete functional reach activity placing 5 cans in/out of cabinet with mod I; mild difficulty with removing cans. Therapist educated about compensatory strategies using left upper extremity while incorporating right to maximize neuro re-education and promote strength/coordination. He verbalized understanding. Increased challenge reported  "by Peña for all fine motor coordination activities. He reported compliance with home exercise program stating the theraband "felt good".     Peña is progressing well towards his goals and there are no updates to goals at this time. Pt prognosis is Good.     Pt will continue to benefit from skilled outpatient occupational therapy to address the deficits listed in the problem list on initial evaluation provide pt/family education and to maximize pt's level of independence in the home and community environment.     Anticipated barriers to occupational therapy: none    Pt's spiritual, cultural and educational needs considered and pt agreeable to plan of care and goals.    Goals:   Short Term Goals: 5 weeks  - Pt will be independent with Home Exercise Program (HEP)/Home Activity Program (HAP) to promote long term maintenance of progress made in therapy. Not met, progressing  - Pt will demonstrate right shoulder flexion/abduction to 140* or better in order to improve participation with reaching tasks Not met, progressing  - Pt will verbalize understanding of sensory strategies to improve input to right upper extremity and increase safety with daily tasks. Met 11/16/2022  - Pt will increase right  strength to 45# or better in order to increase ease with housekeeping and cooking tasks. Not met, progressing  - Pt will improve right key/3pt/2pt pinch strength to 20#/15#/17# in order to improve ease with buttons, zippers, opening containers, etc Not met, progressing     Long Term Goals: 10 weeks  - Pt will improve FOTO score by 5 points or better in order to demonstrate improved use of right upper extremity with daily tasks Not met, progressing  - Pt will improve right manual muscle strength to 4/5 or better in all planes in order to return to prior level of function Not met, progressing  - Pt will increase right  strength to 60# or better in order to increase ease with housekeeping and cooking tasks. Not met, " progressing  - Pt will improve right key/3pt/2pt pinch strength to 25#/18#/18# in order to improve ease with buttons, zippers, opening containers, etc  Not met, progressing  - Pt will complete 9 hole peg assessment in 1 minute or less using right upper extremity in order to improve dexterity and gross motor coordination during dressing and leisure tasks Not met, progressing  - Pt will manage various sized containers with increased ease using bilateral upper extremities and adaptive equipment as needed in order to improve independence with cooking tasks. Not met, progressing  - Pt will use right upper extremity to place 5 objects of various weight on overhead shelf in order to improve safety and ease with household chores Not met, progressing  - Pt will report increased participation and independence with daily tasks using right upper extremity Not met, progressing    Plan     Updates/Grading for next session: continue with POC Corinne Rapier, OT   11/16/2022

## 2022-11-16 NOTE — PROGRESS NOTES
"  [facility]   Physical Therapy Treatment Note     Name: Adarsh Wheeler  [facility] Number: [id number]    Therapy Diagnosis:   Encounter Diagnoses   Name Primary?    Right leg weakness Yes    Gait abnormality      Visit Date: 11/16/2022    Physician: ROSANNA Servin III, MD     Physician Orders: PT Eval and Treat   Medical Diagnosis from Referral:   Z98.890 (ICD-10-CM) - S/P carotid endarterectomy   I63.9 (ICD-10-CM) - Left-sided cerebrovascular accident (CVA)   Evaluation Date: 10/12/2022  Authorization Period Expiration: 9/28/2023  Plan of Care Expiration: 12/9/2022  Visit # / Visits authorized: 2/20 (+ 3 additional visits)  FOTO #: 4/5  PTA Visit #: 0/5     Time In: 11:47 AM  Time Out: 12:28 PM  Total Billable Time: 41 minutes (2 NMR + 1 TE)    SUBJECTIVE     Patient reports: No major changes noted since last session. Does say he is performing exercise regularly at home  He was compliant with home exercise program.  Response to previous treatment: mild soreness  Functional change: ongoing    Pain: 0/10  Location: NA      OBJECTIVE     Objective Measures updated at progress report unless specified.     Lower Extremity Strength (updated values in parentheses)    RLE LLE   Hip Flexion: 4/5 (4+/5) 5/5 (5/5)   Hip Extension:  4/5 (4+/5) 4+/5 (5/5)   Hip Abduction: 4+/5 (4+/5) 5/5 (5/5)   Knee Extension: 4+/5 (4+/5) 5/5 (5/5)   Knee Flexion: 4+/5 (4+/5) 5/5 (5/5)   Ankle Dorsiflexion: 4+/5 (5/5) 5/5 (5/5)      30-Second Sit to Stand: 14 repetitions    Treatment     Peña received the treatments listed below:      therapeutic exercises to develop strength, endurance, ROM and flexibility for 16 minutes including:    - reciprocal stepper bike x 7 minutes level 4.5 sprints setting  - standing gastroc stretch 2x45"B  - manual muscle testing (see above)  - 30-second sit to stand (see above)    NOT TODAY  - standing heel-toe raises off green foam oval 2x15  - staggered sit to stands from chair " "height with 10# 2x15  - standing hip abduction with stance leg on green foam disc 2x15B green theraband  - step up on bosu with contralateral hip/knee flexion: x10 B with UE support  - side lunges on sliders: 2x10 B with UE support    - standing lunges to bosu: x10 B forward with single upper extremity support                                            : x10 B lateral with single upper extremity support     neuromuscular re-education activities to improve: Balance, Coordination, Kinesthetic, Sense and Proprioception for 25 minutes. The following activities were included:    - eyes closed, narrow base of support on airex 3x30"  - short kneel > tall kneel > yellow med ball shoulder press x15  - short kneel > tall kneel > yellow med ball chest press x15  - semi tandem stance on bosu 3x30"    NOT TODAY  - reciprocal step ups on soft fitter board with red med ball overhead press: 2x10  - reciprocal cone taps from soft fitter board: 3x30  - single leg crossover cone taps from soft fitter board: 2x30" B  - lateral steps on airex beam: 4 lengths x 10 feet  - lateral steps on airex beam with hurdles (4): 4 lengths x 10 feet  - tandem walking solid ground: 4 lengths x 10 feet      Patient Education and Home Exercises     Home Exercises Provided and Patient Education Provided     Education provided:   - daily HEP compliance    Written Home Exercises Provided: Patient instructed to cont prior HEP. Exercises were reviewed and Peña was able to demonstrate them prior to the end of the session.  Peña demonstrated good  understanding of the education provided. See EMR under Patient Instructions for exercises provided during therapy sessions    ASSESSMENT     Peña arrived to session without any complaints of pain and was agreeable to treatment. Progressing gradually in terms of bilateral lower extremity muscular strength/power. Appropriate levels of fatigue achieved during developmental sequencing activity. Still with difficulty " dissociating right lower extremity joint motion secondary to extension synergy. He would benefit from continued PT services to achieve functional goals.    Peña Is progressing well towards his goals.   Pt prognosis is Excellent.     Pt will continue to benefit from skilled outpatient physical therapy to address the deficits listed in the problem list box on initial evaluation, provide pt/family education and to maximize pt's level of independence in the home and community environment.     Pt's spiritual, cultural and educational needs considered and pt agreeable to plan of care and goals.     Anticipated barriers to physical therapy: Chronicity of impairments     Goals: Short Term Goals: 4 weeks   Patient will be compliant with HEP in order to maximize PT benefits (MET)  Patient will improve bilateral lower extremity MMT grades by >/=1/2 grade in order to improve strength for ADL completion (progressing, not met)  Patient will score >/= 23/30 on FGA in order to reduce risk for falls and improve postural control (progressing, not met)     Long Term Goals: 8 weeks   Patient will improve bilateral lower extremity MMT grades by >/=1 grade in order to improve strength for ADL completion (progressing, not met)  Patient will score >/= 25/30 on FGA in order to reduce risk for falls and improve postural control (progressing, not met)  Patient will score >/= 14 repetitions on 30-Second Sit to  order to improve BLE endurance and muscular power for transfers (MET)  Patient will perform 1 floor <> stand transfer with bilateral upper extremity support in order to demonstrate safe functional mobility in case of future fall (progressing, not met)  Patient will report 0 falls from initiation of PT management (progressing, not met)  Patient will begin some form of home/community fitness in order to sustain progress gained in PT (progressing, not met)    PLAN     Plan to cont with progression of PT goals per POCDionicio RODRIGEZ  EDISON, PT

## 2022-11-16 NOTE — PROGRESS NOTES
[facility]  Speech Therapy Treatment Note- Neurological Rehabilitation    Date: 11/16/2022     Name: Adarsh Wheeler   MRN: [id number]   Therapy Diagnosis:   Encounter Diagnoses   Name Primary?    Broca's aphasia Yes    Apraxia of speech      Physician: Kush Serrano MD  Physician Orders: [id number] - AMB REFERRAL/CONSULT TO SPEECH THERAPY  Medical Diagnosis: I63.9 (ICD-10-CM) - Cerebral infarction, unspecified    Visit #/ Visits Authorized: 9/ 15  Date of Evaluation:  7/18/2022   Insurance Authorization Period:  6/14/22- 12/11/22   Plan of Care Expiration Date:    9/12/22, 10/28/2022, 12/23/2022  Extended Plan of Care:  yes, on 10/26/2022  Progress Note: 11/26/2022  Visits Cancelled: 0  Visits No Show: 0    Time In:  1405  Time Out:  1445  Total Billable Time: 40 minutes     Precautions: Standard  Subjective:   Patient reports: Patient reports he has a new cough which started last week  He was compliant to home exercise program.   Response to previous treatment: improvement in word finding and verbal fluency   Pain Scale:  4/10 on a Visual Analog Scale currently.   Pain Location: right arm  Objective:        UNTIMED  Procedure Min.   Speech- Language- Voice Therapy  40   Total Timed Units: 0  Total Untimed Units: 1  Charges Billed/Number of units: 1    Short Term Goals: (4 weeks) Current Progress:   1. Pt will complete Verb Network Strengthening Training for 4 verbs per session given minimal cues.     Progressing/ Not Met 11/16/2022    Not addressed in today's session.    2. Pt will follow attentive reading and constraint summarization with moderate cues for story retell with given constraints.     Progressing/ Not Met 11/16/2022    Not addressed in today's session.     Met x1   3. Pt will follow complex multiunit commands with 90% accuracy independently.     Progressing/ Not Met 11/16/2022    Not addressed in today's session.    5. Pt will complete verbal reasoning tasks with minimal cues for  accuracy.      Progressing/ Not Met 2022    Letter chaining word reasoning task with constraint naming (I.e., naming 4 category members when given starting letter) with moderate cues for accuracy    6. Patient will complete Combined Aphasia and Apraxia of Speech Treatment for 5 verbs independently.    Progressing/Not Met 2022  Completed for 3 verbs with minimal cues and significantly extended time.    Patient was able to speed up verbal expression with cues         Patient Education/Response:   Patient educated regarding the followin. Neuroanatomy of his stroke and associated deficits  2. What is Aquired Apraxia of Speech and Aphasia    Patient verbalized understanding to all above education provided.     Home program established: yes-Patient to utilize strategies in conversation   Exercises were reviewed and Peña was able to demonstrate them prior to the end of the session.  Peña demonstrated good  understanding of the education provided.     See Electronic Medical Record under Patient Instructions for exercises provided throughout therapy.  Assessment:   Peña is progressing well towards his goals. Excellent improvement in verbal expression complexity and with implementation of strategies for apraxia. Difficulty noted today with reasoning tasks as well as significant increase in articulatory groping noted today and associated with frustration. Education regarding stroke type and associated deficits provided. Patient stated this made him understand his stroke and deficits better. Current goals remain appropriate. Goals to be updated as necessary.     Patient prognosis is Good. Patient will continue to benefit from skilled outpatient speech and language therapy to address the deficits listed in the problem list on initial evaluation, provide patient/family education and to maximize patient's level of independence in the home and community environment.   Medical necessity is demonstrated by the  following IMPAIRMENTS:  Deficits in executive functioning and memory prevent the patient performing his work and personal daily activities effectively and efficiently which may put him at risk of unsafe behavior and a decline in quality of life.  Barriers to Therapy: transportation  Patient's spiritual, cultural and educational needs considered and patient agreeable to plan of care and goals.  Plan:   Continue Plan of Care with focus on communication deficits.     Ev Yoo CCC-SLP   11/16/2022

## 2022-11-21 PROBLEM — I63.9 LEFT-SIDED CEREBROVASCULAR ACCIDENT (CVA): Status: RESOLVED | Noted: 2022-07-11 | Resolved: 2022-11-21

## 2022-11-22 ENCOUNTER — PATIENT MESSAGE (OUTPATIENT)
Dept: VASCULAR SURGERY | Facility: CLINIC | Age: 57
End: 2022-11-22
Payer: OTHER GOVERNMENT

## 2022-12-02 ENCOUNTER — OFFICE VISIT (OUTPATIENT)
Dept: VASCULAR SURGERY | Facility: CLINIC | Age: 57
End: 2022-12-02
Payer: OTHER GOVERNMENT

## 2022-12-02 VITALS
BODY MASS INDEX: 26.51 KG/M2 | SYSTOLIC BLOOD PRESSURE: 128 MMHG | TEMPERATURE: 99 F | HEART RATE: 72 BPM | HEIGHT: 70 IN | WEIGHT: 185.19 LBS | DIASTOLIC BLOOD PRESSURE: 79 MMHG

## 2022-12-02 DIAGNOSIS — Z98.890 S/P CAROTID ENDARTERECTOMY: Primary | ICD-10-CM

## 2022-12-02 PROCEDURE — 99999 PR PBB SHADOW E&M-EST. PATIENT-LVL IV: ICD-10-PCS | Mod: PBBFAC,,, | Performed by: SURGERY

## 2022-12-02 PROCEDURE — 99214 OFFICE O/P EST MOD 30 MIN: CPT | Mod: PBBFAC | Performed by: SURGERY

## 2022-12-02 PROCEDURE — 99214 PR OFFICE/OUTPT VISIT, EST, LEVL IV, 30-39 MIN: ICD-10-PCS | Mod: S$PBB,,, | Performed by: SURGERY

## 2022-12-02 PROCEDURE — 99214 OFFICE O/P EST MOD 30 MIN: CPT | Mod: S$PBB,,, | Performed by: SURGERY

## 2022-12-02 PROCEDURE — 99999 PR PBB SHADOW E&M-EST. PATIENT-LVL IV: CPT | Mod: PBBFAC,,, | Performed by: SURGERY

## 2022-12-02 NOTE — PROGRESS NOTES
VASCULAR SURGERY SERVICE    CHIEF COMPLAINT:  Major left hemispheric stroke    HISTORY OF PRESENT ILLNESS: Adarsh Wheeler is a 57 y.o. male [facility] who suffered a major left hemispheric stroke in December 2021. Per the sister sister SOFÍA he was found down after no contact for 4 days.  He initially was aphasic with severe motor deficits of his right arm and right leg.  He ultimately spent 8 months in rehab center was discharge proximally 2 months ago.  He is currently living independently.  He is also having some right neck pain    He denies any new symptoms of stroke TIA or amaurosis since this initial event.  However [facility] proxim[facility] 6-8 weeks ago that had suggested a new potential subacute or extension of his prior left hemispheric stroke.    [facility] imaging also suggested a carotid siphon extension of his carotid stenosis on the left.    Is currently being managed with dual antiplatelet therapy and statin.  He is a nonsmoker.    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  He carries a diagnosis of posttraumatic stress syndrome    8/9/2022:  He now returns after CTA imaging.  No change in his prior history as detailed above.  Continues on dual antiplatelet therapy, and statin    S/p  Re-exploration for bleeding, left neck, 08/25/2022  Left carotid endarterectomy 08/17/2022 9/13/2022:  This is his 1st postoperative visit.  He had unremarkable initial postoperative course after his carotid endarterectomy on 08/17/2022 and went home on postoperative day 1.   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   He was seen in the [facility] ED, had a CTA which showed no evidence of arterial bleeding but a large left neck hematoma.  Was transferred oxygen underwent urgent decompression of this large neck hematoma as above.  No significant bleeding point was found.    Since discharge after the  re-exploration, I was week after the original endarterectomy, he has done well.  Denies any interval stroke TIA or amaurosis      12/2/2022:  The patient returns at the urging of his sister for questions or regarding his ability to eat completely solid food, with occasional dysphagia.  In talking to the patient, he is had issues with swallowing solid food ever since his major left hemispheric stroke in December 2021.  Does not feel that this intermittent dysphagia, or inability to eat very solid foods, has changed since his carotid endarterectomy and re-exploration for bleeding in August 2022.   Feels that his expressive aphasia has stable from preop.  He denies any hoarseness    He is currently getting speech therapy, physical therapy and occupational therapy    History reviewed. No pertinent past medical history.    Past Surgical History:   Procedure Laterality Date    CAROTID ENDARTERECTOMY Left 8/17/2022    Procedure: ENDARTERECTOMY-CAROTID;  Surgeon: ROSANNA Servin III, MD;  Location: [facility] OR [address] [place] [address];  Service: Peripheral Vascular;  Laterality: Left;    EVACUATION OF HEMATOMA Left 8/25/2022    Procedure: EVACUATION, HEMATOMA;  Surgeon: ROCCO Torres II, MD;  Location: [facility] OR [address] [place] [address];  Service: Cardiovascular;  Laterality: Left;    EYE SURGERY      FOOT SURGERY      HAND SURGERY      RE-EXPLORATION FOR BLEEDING Left 8/25/2022    Procedure: RE-EXPLORATION, FOR BLEEDING,;  Surgeon: ROCCO Torres II, MD;  Location: [address];  Service: Cardiovascular;  Laterality: Left;         Current Outpatient Medications:     acetaminophen (TYLENOL) 325 MG tablet, Take 650 mg by mouth., Disp: , Rfl:     aspirin (ASPIR-81 ORAL), Take by mouth every morning., Disp: , Rfl:     DULoxetine (CYMBALTA) 30 MG capsule, Take 1 capsule (30 mg total) by mouth once daily. (Patient taking differently: Take 30 mg by mouth daily as needed.), Disp: 90 capsule, Rfl: 3    lactulose (CHRONULAC) 10 gram/15 mL solution, Take 20 g by  "mouth., Disp: , Rfl:     lisinopriL (PRINIVIL,ZESTRIL) 2.5 MG tablet, Take 1 tablet (2.5 mg total) by mouth once daily. (Patient taking differently: Take 2.5 mg by mouth every morning.), Disp: 90 tablet, Rfl: 3    MELATONIN ORAL, Take by mouth every evening., Disp: , Rfl:     ondansetron (ZOFRAN) 4 MG tablet, Take 1 tablet by mouth every 6 (six) hours as needed., Disp: , Rfl:     oxyCODONE (ROXICODONE) 5 MG immediate release tablet, Take 1 tablet (5 mg total) by mouth every 4 (four) hours as needed for Pain., Disp: 15 tablet, Rfl: 0    polyethylene glycol (GLYCOLAX) 17 gram/dose powder, Take by mouth., Disp: , Rfl:     rosuvastatin (CRESTOR) 40 MG Tab, Take 1 tablet (40 mg total) by mouth once daily. (Patient taking differently: Take 40 mg by mouth every evening.), Disp: 90 tablet, Rfl: 3    sertraline (ZOLOFT) 25 MG tablet, Take 25 mg by mouth once daily., Disp: , Rfl:     traZODone (DESYREL) 100 MG tablet, Take 0.5 tablets by mouth nightly as needed., Disp: , Rfl:     Review of patient's allergies indicates:   Allergen Reactions    Mirtazapine Rash       Family History   Problem Relation Age of Onset    COPD Father             Cancer Sister         Bilateral Masectomy    Stroke Maternal Uncle         deceases       Social History     Tobacco Use    Smoking status: Former     Types: Pipe     Quit date: 2014     Years since quittin.9    Smokeless tobacco: Never   Substance Use Topics    Alcohol use: Not Currently    Drug use: Never       PHYSICAL EXAM:   /79 (BP Location: Right arm, Patient Position: Sitting, BP Method: Large (Automatic))   Pulse 72   Temp 98.7 °F (37.1 °C) (Oral)   Ht 5' 10" (1.778 m)   Wt 84 kg (185 lb 3 oz)   BMI 26.57 kg/m²   Constitutional:  Alert,   Well-appearing  In no distress.   Neurological: Speech demonstrates ability to communicate, but does have intermittent word-finding difficulties consistent with expressive aphasia    RIGHT arms 4+ over 5 strength.   He " ambulates without assistance, although he has slight right foot drag.  unchanged from preoperative status  no focal findings  CN II - XII grossly intact.    Psychiatric: Mood and affect appropriate and symmetric.   HEENT: Normocephalic / atraumatic  PERRLA  Midline trachea  Well-healed left cervical scar no swelling erythema or drainage   Cardiac: Regular rate and rhythm.   Pulmonary: Normal pulmonary effort.   Abdomen: Soft, not distended.     Skin: Warm and well perfused.    Vascular:  2+ radial pulses bilaterally   Extremities/  Musculoskeletal: No edema.        IMAGING:  No new imaging today.  9/13/2022  Carotid duplex reveals a right 60-79% carotid stenosis with a peak velocity of 269 and diastolic 77 ratio of 3.1.  He has no significant residual left carotid stenosis    8/22 Recent carotid CTA was personally reviewed and demonstrates a 99% left cervical carotid stenosis with calcification.  The remainder of the upper cervical ICA  And siphon has diminished caliber throughout but is smooth.  There is a moderate right carotid stenosis      IMPRESSION:   1.  Persistent challenges with eating solid foods, and persistent mild expressive aphasia.    These have not changed since his carotid surgery.  I believe these findings to be attributed to his initial major hemispheric stroke.    PLAN:    Continue dual antiplatelet therapy and statin  Continue with speech therapy, occupational therapy and physical therapy    Will reach out to my stroke rehab colleagues to ascertain who would be the best person for him to see regarding these challenges with solid food and occasional dysphagia.    RALPH Servin III, MD, FACS  Professor and Chief, Vascular and Endovascular Surgery

## 2022-12-07 ENCOUNTER — CLINICAL SUPPORT (OUTPATIENT)
Dept: REHABILITATION | Facility: HOSPITAL | Age: 57
End: 2022-12-07
Payer: OTHER GOVERNMENT

## 2022-12-07 DIAGNOSIS — R26.9 GAIT ABNORMALITY: ICD-10-CM

## 2022-12-07 DIAGNOSIS — R47.01 BROCA'S APHASIA: Primary | ICD-10-CM

## 2022-12-07 DIAGNOSIS — M62.81 MUSCLE WEAKNESS: Primary | ICD-10-CM

## 2022-12-07 DIAGNOSIS — R29.898 RIGHT LEG WEAKNESS: Primary | ICD-10-CM

## 2022-12-07 DIAGNOSIS — R27.9 LACK OF COORDINATION: ICD-10-CM

## 2022-12-07 DIAGNOSIS — R48.2 APRAXIA OF SPEECH: ICD-10-CM

## 2022-12-07 PROCEDURE — 92507 TX SP LANG VOICE COMM INDIV: CPT | Mod: PN

## 2022-12-07 PROCEDURE — 97110 THERAPEUTIC EXERCISES: CPT | Mod: PN

## 2022-12-07 PROCEDURE — 97112 NEUROMUSCULAR REEDUCATION: CPT | Mod: PN

## 2022-12-07 PROCEDURE — 97530 THERAPEUTIC ACTIVITIES: CPT | Mod: PN

## 2022-12-07 NOTE — PROGRESS NOTES
"Occupational Therapy Daily Treatment Note     Name: Adarsh Wheeler  Clinic Number: [id number]    Therapy Diagnosis:   Encounter Diagnoses   Name Primary?    Muscle weakness Yes    Lack of coordination      Physician: Stefan Woods*    Visit Date: 12/7/2022    Physician Orders: OT eval and treat   Medical Diagnosis: R53.1 (ICD-10-CM) - Right sided weakness I63.9 (ICD-10-CM) - Left-sided cerebrovascular accident (CVA)   Evaluation Date: 9/27/2022  Plan of Care Expiration Period: 1/20/2023  Insurance Authorization period Expiration: 11/30/2022  Visit # / Visits Authorized: 8 / 20  FOTO: 9/27/2022     Time In: 2:25  Time Out: 3:10  Total Billable (one on one) Time: 45 minutes     Precautions: Standard and Fall    Subjective     Pt reports: "I have been doing exercises daily"  he was compliant with home exercise program given last session.   Response to previous treatment:good   Functional change: no major changes reported     Pain: 0/10  Location: right upper extremity     Objective     Physical Exam:  Postural examination/scapula alignment: Affected scapula upwardly rotated  Joint integrity: Firm end feeling  Skin integrity: intact   Edema: none present and pt does not report edema in upper extremities    Palpation: no pain with palpation      Joint Evaluation  AROM  9/27/2022 MMS   9/27/2022 AROM  11/2/2022 MMS  11/2/2022 AROM  12/7/2022 MMS  12/7/2022     Right Right Right Right  Right  Right    Shoulder Flex 0-180 109 3-/5 120 3+/5 123 3+/5   Shoulder Extension 0-80 59 3-/5 70 3+/5 70 4/5   Shoulder Abd 0-180 125 3-/5 111 4-/5 122 4/5   Shoulder ER 0-90 56 3-/5 60 3+/5 71 3+/5   Shoulder IR 0-90 S2 3-/5 L2 4/5 S2 4/5   Shoulder Horizontal adduction 0-90 35 3-/5 50 4/5 35 4/5   Elbow Flex/Ext 0-150 130/0 3/5  4/5  5/5   Wrist Flex 0-80 65 3-/5  4+/5  5/5   Wrist Ext 0-70 50 3-/5  4+/5  5/5   Supination 0-80 WNL 3/5  5/5     Pronation 0-80 WNL 3/5  5/5     Ulnar Deviation   3/5  5/5     Radial Deviation   "  3/5  5/5     *WNL - Within Normal Limits  *NT = Not Tested     Fist: normal      Strength: (ADRIANNA Dynamometer in lbs.) Average 3 trials, Position II:       9/27/2022 9/27/2022 11/2/2022 12/7/2022   Rung II Right Left Right  Right    Trial 1 34# 85# 44# 38#   Trial 2 39# 91# 41# 42#   Trial 3 40# 101# 43# 44#   Average 37.6# 92.3# 42# 41#      Normal  Average Values  Female Right Left Male: Right Left   20-29 66 lbs 62 lbs         94 lbs 86 lbs   30-39 68 lbs 64 lbs 90 lbs 82 lbs   40-49 64 lbs 62 lbs 80 lbs 74 lbs   50-59 62 lbs 57 lbs 72 lbs 65 lbs   60-69 53 lbs 51 lbs 63 lbs 56 lbs   70+ 44 lbs 42 lbs 54 lbs 48 lbs   SD = +/- 19lbs         Pinch Strength (Measured in lbs)       9/27/2022 9/27/2022 11/2/2022 12/7/2022     Right Left Right  Right    Key Pinch 15 # 33.5 # 14# 14#   3pt Pinch 13 # 24 # 11# 12#   2pt Pinch 16 # 23 # 14# 11#         Fine/Gross Motor Coordination     Assessment   Right   9/27/2022 Left  9/27/2022 Right  11/2/2022 Right   12/7/2022   9 hole peg test 9 pegs in/out for time 1:30 0:34 1:22 1:22   *WNL - Within Normal Limits  *NT = Not Tested     Tone:  Modified Terra Scale:   1-  Slight increase in muscle tone, manifested by a catch and release or by minimal resistance at the end of the range of motion when the affected part(s) is moved in flexion or extension     Sensation:  Adarsh EGAN III  reports numbness on right side       Sensation Intact  Impaired Comments    [place] Debra  Deep Touch (6.65) [x]  []       Protective Sensation (4.56) [x]  []       Light Touch (3.61) []  [x]  5/7 trials detected, difficulty with median nerve innervation                 Other Kinesthesia  [x]  []       Temperature [x]  []       Stereognosis  []  []         Balance:   Static Sit - NORMAL: No deviations seen in posture held statically  Dynamic sit- NORMAL: No deviations seen in posture held statically  Static Stand - FAIR+: Able to take MINIMAL challenges from all directions  Dynamic stand -  FAIR+: Able to take MINIMAL challenges from all directions     Endurance Deficit: moderate    Peña participated in dynamic functional therapeutic activities to improve functional performance for 45  minutes, including:  - Reassessment   - wrist maze (yellow) x2 minutes  - wrist flexion/extension with velcro board 2x5 up/down  - locating 8 buttons in green putty only using right hand   - in hand manipulation activity with marbles + translation     Home Exercises and Education Provided     Education provided:   - postural and active assisted range of motion exercises   - Progress towards goals     Written Home Exercises Provided: yes.  Exercises were reviewed and Peña was able to demonstrate them prior to the end of the session.  Peña demonstrated good  understanding of the HEP provided.   .   See EMR under Patient Instructions for exercises provided 10/5/2022.     Assessment     Peña is progressing well towards his goals and there are no updates to goals at this time. Pt prognosis is Good.     Pt will continue to benefit from skilled outpatient occupational therapy to address the deficits listed in the problem list on initial evaluation provide pt/family education and to maximize pt's level of independence in the home and community environment.     Anticipated barriers to occupational therapy: none    Pt's spiritual, cultural and educational needs considered and pt agreeable to plan of care and goals.    Goals:   See UPOC for progress towards goals    Plan     Updates/Grading for next session: continue with POC Corinne Rapier, OT   12/7/2022

## 2022-12-07 NOTE — PROGRESS NOTES
[facility]  Speech Therapy Treatment Note- Neurological Rehabilitation    Date: 12/7/2022     Name: Adarsh Wheeler   MRN: [id number]   Therapy Diagnosis:   Encounter Diagnoses   Name Primary?    Broca's aphasia Yes    Apraxia of speech      Physician: Kush Serraon MD  Physician Orders: [id number] - AMB REFERRAL/CONSULT TO SPEECH THERAPY  Medical Diagnosis: I63.9 (ICD-10-CM) - Cerebral infarction, unspecified    Visit #/ Visits Authorized: 10/ 15  Date of Evaluation:  7/18/2022   Insurance Authorization Period:  6/14/22- 12/11/22   Plan of Care Expiration Date:    9/12/22, 10/28/2022, 12/23/2022  Extended Plan of Care:  yes, on 10/26/2022  Progress Note: 11/26/2022  Visits Cancelled: 0  Visits No Show: 0    Time In:  1405  Time Out:  1445  Total Billable Time: 40 minutes     Precautions: Standard  Subjective:   Patient reports: he is noticing new improvements with his speech and is happy with the progress.   He was compliant to home exercise program.   Response to previous treatment: improvement in word finding and verbal fluency   Pain Scale:  4/10 on a Visual Analog Scale currently.   Pain Location: right arm  Objective:        UNTIMED  Procedure Min.   Speech- Language- Voice Therapy  40   Total Timed Units: 0  Total Untimed Units: 1  Charges Billed/Number of units: 1    Short Term Goals: (4 weeks) Current Progress:   1. Pt will complete Verb Network Strengthening Training for 4 verbs per session given minimal cues.     Progressing/ Not Met 12/7/2022    Not addressed in today's session.    2. Pt will follow attentive reading and constraint summarization with moderate cues for story retell with given constraints.     Progressing/ Not Met 12/7/2022    Patient completed. Competed for one moderate level paragraph given minimal cues for story retell with constraints.     Met x2   3. Pt will follow complex multiunit commands with 90% accuracy independently.     Progressing/ Not Met 12/7/2022    Not  addressed in today's session.    5. Pt will complete verbal reasoning tasks with minimal cues for accuracy.      Progressing/ Not Met 2022    Letter chaining word reasoning task completed with minimal cues for accuracy.    Met x1   6. Patient will complete Combined Aphasia and Apraxia of Speech Treatment for 5 verbs independently.    Progressing/Not Met 2022  Completed for 6 verbs with minimal cues and mildly extended time     Patient was able to speed up verbal expression with cues    Patient also completed the same task without visual supports while walking- significantly additional time required.          Patient Education/Response:   Patient educated regarding the followin. Neuroanatomy of his stroke and associated deficits  2. Expected trajectory of recovery    Patient verbalized understanding to all above education provided.     Home program established: yes-Patient to utilize strategies in conversation   Exercises were reviewed and Peña was able to demonstrate them prior to the end of the session.  Peña demonstrated good  understanding of the education provided.     See Electronic Medical Record under Patient Instructions for exercises provided throughout therapy.  Assessment:   Peña is progressing well towards his goals. Patient with excellent engagement  throughout the session. Increase in verbal fluency noted today and functionally, the patient presents with increased ownership of the communication burden as compare to the communication partner. This was noted by Occupational Therapy as well today. Current goals remain appropriate. Goals to be updated as necessary.     Patient prognosis is Good. Patient will continue to benefit from skilled outpatient speech and language therapy to address the deficits listed in the problem list on initial evaluation, provide patient/family education and to maximize patient's level of independence in the home and community environment.   Medical necessity is  demonstrated by the following IMPAIRMENTS:  Deficits in executive functioning and memory prevent the patient performing his work and personal daily activities effectively and efficiently which may put him at risk of unsafe behavior and a decline in quality of life.  Barriers to Therapy: transportation  Patient's spiritual, cultural and educational needs considered and patient agreeable to plan of care and goals.  Plan:   Continue Plan of Care with focus on communication deficits.     Ev Yoo CCC-SLP   12/7/2022

## 2022-12-07 NOTE — PROGRESS NOTES
" [facility]   Physical Therapy Treatment Note     Name: Adarsh Wheeler  Clinic Number: [id number]    Therapy Diagnosis:   Encounter Diagnoses   Name Primary?    Right leg weakness Yes    Gait abnormality      Visit Date: 12/7/2022    Physician: ROSANNA Servin III, MD     Physician Orders: PT Eval and Treat   Medical Diagnosis from Referral:   Z98.890 (ICD-10-CM) - S/P carotid endarterectomy   I63.9 (ICD-10-CM) - Left-sided cerebrovascular accident (CVA)   Evaluation Date: 10/12/2022  Authorization Period Expiration: 9/28/2023  Plan of Care Expiration: 12/9/2022  Visit # / Visits authorized: 3/20 (+ 3 additional visits)  FOTO #: Next  PTA Visit #: 0/5     Time In: 1:30PM  Time Out: 2:25PM  Total Billable Time: 55 minutes (2 NMR + 2 TE)    SUBJECTIVE     Patient reports: Would like to continue physical therapy. Keeping up with exercise at home.  He was compliant with home exercise program.  Response to previous treatment: mild soreness  Functional change: ongoing    Pain: 0/10  Location: NA      OBJECTIVE     Objective Measures updated at progress report unless specified.     Treatment     Peña received the treatments listed below:      therapeutic exercises to develop strength, endurance, ROM and flexibility for 25 minutes including:    - reciprocal stepper bike x 8 minutes level 5 sprints setting  - standing gastroc stretch 2x45"B  - standing heel-toe raises off green foam oval 2x15  - staggered sit to stands from chair height with 10# 2x10    NOT TODAY  - step up on bosu with contralateral hip/knee flexion: x10 B with UE support  - side lunges on sliders: 2x10 B with UE support    - standing lunges to bosu: x10 B forward with single upper extremity support                                            : x10 B lateral with single upper extremity support     neuromuscular re-education activities to improve: Balance, Coordination, Kinesthetic, Sense and Proprioception for 30 minutes. The " "following activities were included:    - reciprocal step ups to 6" step with contralateral hip thruster 2x12B  - resisted side stepping without upper extremity support x 6 lengths x 10 feet each green theraband around ankle  - full tandem stance on green foam discs 2x30" each leg leading  - eyes closed, narrow base of support on airex 2x30"  - short kneel > tall kneel > yellow med ball chest press 2x12  - half kneel chest press with red med ball 2x12    NOT TODAY  - semi tandem stance on bosu 3x30"  - reciprocal step ups on soft fitter board with red med ball overhead press: 2x10  - reciprocal cone taps from soft fitter board: 3x30  - single leg crossover cone taps from soft fitter board: 2x30" B  - lateral steps on airex beam: 4 lengths x 10 feet  - lateral steps on airex beam with hurdles (4): 4 lengths x 10 feet  - tandem walking solid ground: 4 lengths x 10 feet      Patient Education and Home Exercises     Home Exercises Provided and Patient Education Provided     Education provided:   - daily HEP compliance    Written Home Exercises Provided: Patient instructed to cont prior HEP. Exercises were reviewed and Peña was able to demonstrate them prior to the end of the session.  Peña demonstrated good  understanding of the education provided. See EMR under Patient Instructions for exercises provided during therapy sessions    ASSESSMENT     Peña arrived to session without any complaints of pain and was agreeable to treatment. Appropriate level of fatigue achieved with activity with patient visibly fatigued mid-session. Occasional verbal cues required to optimize exercise mechanics, particularly to minimize compensatory patterns. He would benefit from continued PT services to achieve functional goals; plan to reassess and update plan of care next.    Peña Is progressing well towards his goals.   Pt prognosis is Excellent.     Pt will continue to benefit from skilled outpatient physical therapy to address the " deficits listed in the problem list box on initial evaluation, provide pt/family education and to maximize pt's level of independence in the home and community environment.     Pt's spiritual, cultural and educational needs considered and pt agreeable to plan of care and goals.     Anticipated barriers to physical therapy: Chronicity of impairments     Goals: Short Term Goals: 4 weeks   Patient will be compliant with HEP in order to maximize PT benefits (MET)  Patient will improve bilateral lower extremity MMT grades by >/=1/2 grade in order to improve strength for ADL completion (progressing, not met)  Patient will score >/= 23/30 on FGA in order to reduce risk for falls and improve postural control (progressing, not met)     Long Term Goals: 8 weeks   Patient will improve bilateral lower extremity MMT grades by >/=1 grade in order to improve strength for ADL completion (progressing, not met)  Patient will score >/= 25/30 on FGA in order to reduce risk for falls and improve postural control (progressing, not met)  Patient will score >/= 14 repetitions on 30-Second Sit to  order to improve BLE endurance and muscular power for transfers (MET)  Patient will perform 1 floor <> stand transfer with bilateral upper extremity support in order to demonstrate safe functional mobility in case of future fall (progressing, not met)  Patient will report 0 falls from initiation of PT management (progressing, not met)  Patient will begin some form of home/community fitness in order to sustain progress gained in PT (progressing, not met)    PLAN     Plan to cont with progression of PT goals per POC.    RAIN HOGAN, PT

## 2022-12-08 NOTE — PLAN OF CARE
" [facility]  Occupational Therapy Plan of Care Note    Name: Adarsh Wheeler  Clinic Number: [id number]    Therapy Diagnosis:   Encounter Diagnoses   Name Primary?    Muscle weakness Yes    Lack of coordination      Physician: Stefan Woods*    Visit Date: 12/7/2022    Physician Orders: OT eval and treat   Medical Diagnosis: R53.1 (ICD-10-CM) - Right sided weakness I63.9 (ICD-10-CM) - Left-sided cerebrovascular accident (CVA)   Evaluation Date: 9/27/2022  Plan of Care Expiration Period: 1/20/2023  Insurance Authorization period Expiration: 11/30/2022  Visit # / Visits Authorized: 8 / 20  FOTO: 9/27/2022     Time In: 2:25  Time Out: 3:10  Total Billable (one on one) Time: 45 minutes     Precautions: Standard and Fall  Functional Level Prior to Evaluation:  independent prior to the stroke; mod I -mod A on eval    SUBJECTIVE     Pt reports: "I have been doing exercises daily"  he was compliant with home exercise program given last session.   Response to previous treatment:good   Functional change: no major changes reported     Pain: 0/10  Location: right upper extremity     OBJECTIVE     Physical Exam:  Postural examination/scapula alignment: Affected scapula upwardly rotated  Joint integrity: Firm end feeling  Skin integrity: intact   Edema: none present and pt does not report edema in upper extremities    Palpation: no pain with palpation      Joint Evaluation  AROM  9/27/2022 MMS   9/27/2022 AROM  11/2/2022 MMS  11/2/2022 AROM  12/7/2022 MMS  12/7/2022     Right Right Right Right  Right  Right    Shoulder Flex 0-180 109 3-/5 120 3+/5 123 3+/5   Shoulder Extension 0-80 59 3-/5 70 3+/5 70 4/5   Shoulder Abd 0-180 125 3-/5 111 4-/5 122 4/5   Shoulder ER 0-90 56 3-/5 60 3+/5 71 3+/5   Shoulder IR 0-90 S2 3-/5 L2 4/5 S2 4/5   Shoulder Horizontal adduction 0-90 35 3-/5 50 4/5 35 4/5   Elbow Flex/Ext 0-150 130/0 3/5  4/5  5/5   Wrist Flex 0-80 65 3-/5  4+/5  5/5   Wrist Ext 0-70 50 " 3-/5  4+/5  5/5   Supination 0-80 WNL 3/5  5/5     Pronation 0-80 WNL 3/5  5/5     Ulnar Deviation   3/5  5/5     Radial Deviation    3/5  5/5     *WNL - Within Normal Limits  *NT = Not Tested     Fist: normal      Strength: (ADRIANNA Dynamometer in lbs.) Average 3 trials, Position II:       9/27/2022 9/27/2022 11/2/2022 12/7/2022   Rung II Right Left Right  Right    Trial 1 34# 85# 44# 38#   Trial 2 39# 91# 41# 42#   Trial 3 40# 101# 43# 44#   Average 37.6# 92.3# 42# 41#      Normal  Average Values  Female Right Left Male: Right Left   20-29 66 lbs 62 lbs         94 lbs 86 lbs   30-39 68 lbs 64 lbs 90 lbs 82 lbs   40-49 64 lbs 62 lbs 80 lbs 74 lbs   50-59 62 lbs 57 lbs 72 lbs 65 lbs   60-69 53 lbs 51 lbs 63 lbs 56 lbs   70+ 44 lbs 42 lbs 54 lbs 48 lbs   SD = +/- 19lbs         Pinch Strength (Measured in lbs)       9/27/2022 9/27/2022 11/2/2022 12/7/2022     Right Left Right  Right    Key Pinch 15 # 33.5 # 14# 14#   3pt Pinch 13 # 24 # 11# 12#   2pt Pinch 16 # 23 # 14# 11#         Fine/Gross Motor Coordination     Assessment   Right   9/27/2022 Left  9/27/2022 Right  11/2/2022 Right   12/7/2022   9 hole peg test 9 pegs in/out for time 1:30 0:34 1:22 1:22   *WNL - Within Normal Limits  *NT = Not Tested     Tone:  Modified Terra Scale:   1-  Slight increase in muscle tone, manifested by a catch and release or by minimal resistance at the end of the range of motion when the affected part(s) is moved in flexion or extension     Sensation:  Adarsh EGAN III  reports numbness on right side       Sensation Intact  Impaired Comments    [place] Debra  Deep Touch (6.65) [x]  []       Protective Sensation (4.56) [x]  []       Light Touch (3.61) []  [x]  5/7 trials detected, difficulty with median nerve innervation                 Other Kinesthesia  [x]  []       Temperature [x]  []       Stereognosis  []  []           ASSESSMENT     Peña demonstrates fair progress with goals, meeting 2/5 short-term goals and 2/6 long-term  goals. Since evaluation, he exhibits improved active range of motion in right shoulder flexion, achieving 123* compared to 109*. He also presents with improved right  strength and is able to manage 5 various sized objects in/out overhead cabinet, indicating improved use with daily tasks. He continues to demonstrate decreased fine motor coordination in right upper extremity compared to left upper extremity. Peña is highly motivated and reports compliance with home exercise program. Due to continued deficits and lapse in care due to scheduling conflict, Peña would benefit from continued skilled OT services to maximize use of right upper extremity, facilitate neuromuscular re-education and improve safety and independence with meaningful activities following CVA.    Previous Short Term Goals Status:   continue with plan of care  New Short Term Goals Status:   continue with initial plan of care  Long Term Goal Status: continue per initial plan of care.  Reasons for Recertification of Therapy:   Peña experienced a lapse in therapy due to scheduling conflicts. He continues to exhibit weakness in his right upper extremity following CVA    Goals:   Short Term Goals: 5 weeks  - Pt will be independent with Home Exercise Program (HEP)/Home Activity Program (HAP) to promote long term maintenance of progress made in therapy. Met 12/9/2022  - Pt will demonstrate right shoulder flexion/abduction to 140* or better in order to improve participation with reaching tasks Not met, progressing  - Pt will verbalize understanding of sensory strategies to improve input to right upper extremity and increase safety with daily tasks. Met 11/16/2022  - Pt will increase right  strength to 45# or better in order to increase ease with housekeeping and cooking tasks. Not met, progressing  - Pt will improve right key/3pt/2pt pinch strength to 20#/15#/17# in order to improve ease with buttons, zippers, opening containers, etc Not met,  progressing     Long Term Goals: 10 weeks  - Pt will improve FOTO score by 5 points or better in order to demonstrate improved use of right upper extremity with daily tasks Not met, progressing  - Pt will improve right manual muscle strength to 4/5 or better in all planes in order to return to prior level of function Not met, progressing  - Pt will increase right  strength to 60# or better in order to increase ease with housekeeping and cooking tasks. Not met, progressing  - Pt will improve right key/3pt/2pt pinch strength to 25#/18#/18# in order to improve ease with buttons, zippers, opening containers, etc  Not met, progressing  - Pt will complete 9 hole peg assessment in 1 minute or less using right upper extremity in order to improve dexterity and gross motor coordination during dressing and leisure tasks Not met, progressing  - Pt will manage various sized containers with increased ease using bilateral upper extremities and adaptive equipment as needed in order to improve independence with cooking tasks. Not met, progressing  - Pt will use right upper extremity to place 5 objects of various weight on overhead shelf in order to improve safety and ease with household chores  Met 12/7/2022  - Pt will report increased participation and independence with daily tasks using right upper extremity Met 12/7/2022    PLAN     Updated Certification Period: 12/7/2022 to 01/20/2023   Recommended Treatment Plan: 1 times per week for 6 weeks:  Fluidotherapy, Manual Therapy, Moist Heat/ Ice, Neuromuscular Re-ed, Paraffin, Patient Education, Self Care, Therapeutic Activities, and Therapeutic Exercise    Corinne Rapier, OT    I CERTIFY THE NEED FOR THESE SERVICES FURNISHED UNDER THIS PLAN OF TREATMENT AND WHILE UNDER MY CARE  Physician's comments:      Physician's Signature: ___________________________________________________

## 2022-12-14 ENCOUNTER — CLINICAL SUPPORT (OUTPATIENT)
Dept: REHABILITATION | Facility: HOSPITAL | Age: 57
End: 2022-12-14
Payer: OTHER GOVERNMENT

## 2022-12-14 DIAGNOSIS — R26.9 GAIT ABNORMALITY: ICD-10-CM

## 2022-12-14 DIAGNOSIS — M62.81 MUSCLE WEAKNESS: Primary | ICD-10-CM

## 2022-12-14 DIAGNOSIS — R29.898 RIGHT LEG WEAKNESS: Primary | ICD-10-CM

## 2022-12-14 DIAGNOSIS — R27.9 LACK OF COORDINATION: ICD-10-CM

## 2022-12-14 PROCEDURE — 97140 MANUAL THERAPY 1/> REGIONS: CPT | Mod: PN

## 2022-12-14 PROCEDURE — 97110 THERAPEUTIC EXERCISES: CPT | Mod: PN

## 2022-12-14 PROCEDURE — 97112 NEUROMUSCULAR REEDUCATION: CPT | Mod: PN

## 2022-12-14 NOTE — PROGRESS NOTES
"Occupational Therapy Daily Treatment Note     Name: Adarsh Wheeler  [facility] Number: [id number]    Therapy Diagnosis:   Encounter Diagnoses   Name Primary?    Muscle weakness Yes    Lack of coordination        Physician: Stefan Woods*    Visit Date: 12/14/2022    Physician Orders: OT eval and treat   Medical Diagnosis: R53.1 (ICD-10-CM) - Right sided weakness I63.9 (ICD-10-CM) - Left-sided cerebrovascular accident (CVA)   Evaluation Date: 9/27/2022  Plan of Care Expiration Period: 1/20/2023  Insurance Authorization period Expiration: 11/30/2022  Visit # / Visits Authorized: 9 / 20  FOTO: 9/27/2022     Time In: 1:45  Time Out: 2:30  Total Billable (one on one) Time: 45 minutes     Precautions: Standard and Fall    Subjective     Pt reports: "I have a pain when I raise my arm. It can be sharp"  he was compliant with home exercise program given last session.   Response to previous treatment:good   Functional change: pain with shoulder flexion     Pain: 0/10  Location: right upper extremity     Objective     Physical Exam:  Postural examination/scapula alignment: Affected scapula upwardly rotated  Joint integrity: Firm end feeling  Skin integrity: intact   Edema: none present and pt does not report edema in upper extremities    Palpation: no pain with palpation      Joint Evaluation  AROM  9/27/2022 MMS   9/27/2022 AROM  11/2/2022 MMS  11/2/2022 AROM  12/7/2022 MMS  12/7/2022     Right Right Right Right  Right  Right    Shoulder Flex 0-180 109 3-/5 120 3+/5 123 3+/5   Shoulder Extension 0-80 59 3-/5 70 3+/5 70 4/5   Shoulder Abd 0-180 125 3-/5 111 4-/5 122 4/5   Shoulder ER 0-90 56 3-/5 60 3+/5 71 3+/5   Shoulder IR 0-90 S2 3-/5 L2 4/5 S2 4/5   Shoulder Horizontal adduction 0-90 35 3-/5 50 4/5 35 4/5   Elbow Flex/Ext 0-150 130/0 3/5  4/5  5/5   Wrist Flex 0-80 65 3-/5  4+/5  5/5   Wrist Ext 0-70 50 3-/5  4+/5  5/5   Supination 0-80 WNL 3/5  5/5     Pronation 0-80 WNL 3/5  5/5     Ulnar Deviation   3/5  5/5   "   Radial Deviation    3/5  5/5     *WNL - Within Normal Limits  *NT = Not Tested     Fist: normal      Strength: (ADRIANNA Dynamometer in lbs.) Average 3 trials, Position II:       9/27/2022 9/27/2022 11/2/2022 12/7/2022   Rung II Right Left Right  Right    Trial 1 34# 85# 44# 38#   Trial 2 39# 91# 41# 42#   Trial 3 40# 101# 43# 44#   Average 37.6# 92.3# 42# 41#      Normal  Average Values  Female Right Left Male: Right Left   20-29 66 lbs 62 lbs         94 lbs 86 lbs   30-39 68 lbs 64 lbs 90 lbs 82 lbs   40-49 64 lbs 62 lbs 80 lbs 74 lbs   50-59 62 lbs 57 lbs 72 lbs 65 lbs   60-69 53 lbs 51 lbs 63 lbs 56 lbs   70+ 44 lbs 42 lbs 54 lbs 48 lbs   SD = +/- 19lbs         Pinch Strength (Measured in lbs)       9/27/2022 9/27/2022 11/2/2022 12/7/2022     Right Left Right  Right    Key Pinch 15 # 33.5 # 14# 14#   3pt Pinch 13 # 24 # 11# 12#   2pt Pinch 16 # 23 # 14# 11#         Fine/Gross Motor Coordination     Assessment   Right   9/27/2022 Left  9/27/2022 Right  11/2/2022 Right   12/7/2022   9 hole peg test 9 pegs in/out for time 1:30 0:34 1:22 1:22   *WNL - Within Normal Limits  *NT = Not Tested     Tone:  Modified Terra Scale:   1-  Slight increase in muscle tone, manifested by a catch and release or by minimal resistance at the end of the range of motion when the affected part(s) is moved in flexion or extension     Sensation:  Adarsh EGAN III  reports numbness on right side       Sensation Intact  Impaired Comments    [place] Debra  Deep Touch (6.65) [x]  []       Protective Sensation (4.56) [x]  []       Light Touch (3.61) []  [x]  5/7 trials detected, difficulty with median nerve innervation                 Other Kinesthesia  [x]  []       Temperature [x]  []       Stereognosis  []  []         Balance:   Static Sit - NORMAL: No deviations seen in posture held statically  Dynamic sit- NORMAL: No deviations seen in posture held statically  Static Stand - FAIR+: Able to take MINIMAL challenges from all  directions  Dynamic stand - FAIR+: Able to take MINIMAL challenges from all directions     Endurance Deficit: moderate    Peña received the following manual therapy techniques: Joint mobilizations, Myofacial release, Soft tissue Mobilization, and Friction Massage were applied to the: right upper extremity  for 15 minutes, including:  - passive range of motion, muscle energy techinques, soft tissue massage, gentle joint distractions in right upper extremity     Peña received therapeutic exercises to develop strength, endurance, ROM, flexibility, and posture for 30 minutes including:  - supine shoulder flexion/extension 2# dowel 2x10   - supine chest press 2# dowel 2x10   - supine scaption 2# dowel 2x10   - supine external rotation 2# dowel 2x10   - sidelying abduction/adduction 2x10  - sidelying horizontal abduction/adduction 2x10   - sidelying external rotation 2x10  - pulleys x3 minutes     Home Exercises and Education Provided     Education provided:   - scapular exercises + dowel movements   - postural and active assisted range of motion exercises   - Progress towards goals     Written Home Exercises Provided: yes.  Exercises were reviewed and Peña was able to demonstrate them prior to the end of the session.  Peña demonstrated good  understanding of the HEP provided.   .   See EMR under Patient Instructions for exercises provided 10/5/2022. 12/14/2022    Assessment     Peña tolerated today's session well. He reported pain with active range of motion in right shoulder. Therapist performed manual techniques on right shoulder and noted increased tone with pectoralis major and minor muscles; therapist educated on importance of stretching to prevent contractures and manage hypertonicity. He reported pain resolved after passive range of motion and supine/sidelying exercises. Home exercise program issued with dowel to increase carry over of movements.     Peña is progressing well towards his goals and there are no  updates to goals at this time. Pt prognosis is Good.     Pt will continue to benefit from skilled outpatient occupational therapy to address the deficits listed in the problem list on initial evaluation provide pt/family education and to maximize pt's level of independence in the home and community environment.     Anticipated barriers to occupational therapy: none    Pt's spiritual, cultural and educational needs considered and pt agreeable to plan of care and goals.    Goals:   Short Term Goals: 5 weeks  - Pt will demonstrate right shoulder flexion/abduction to 140* or better in order to improve participation with reaching tasks Not met, progressing  - Pt will increase right  strength to 45# or better in order to increase ease with housekeeping and cooking tasks. Not met, progressing  - Pt will improve right key/3pt/2pt pinch strength to 20#/15#/17# in order to improve ease with buttons, zippers, opening containers, etc Not met, progressing     Long Term Goals: 10 weeks  - Pt will improve FOTO score by 5 points or better in order to demonstrate improved use of right upper extremity with daily tasks Not met, progressing  - Pt will improve right manual muscle strength to 4/5 or better in all planes in order to return to prior level of function Not met, progressing  - Pt will increase right  strength to 60# or better in order to increase ease with housekeeping and cooking tasks. Not met, progressing  - Pt will improve right key/3pt/2pt pinch strength to 25#/18#/18# in order to improve ease with buttons, zippers, opening containers, etc  Not met, progressing  - Pt will complete 9 hole peg assessment in 1 minute or less using right upper extremity in order to improve dexterity and gross motor coordination during dressing and leisure tasks Not met, progressing  - Pt will manage various sized containers with increased ease using bilateral upper extremities and adaptive equipment as needed in order to improve  independence with cooking tasks. Not met, progressing      Plan     Updates/Grading for next session: continue with POC    Corinne Rapier, OT   12/16/2022

## 2022-12-14 NOTE — PROGRESS NOTES
[facility]   Physical Therapy Treatment Note     Name: Adarsh Wheeler  [facility] Number: [id number]    Therapy Diagnosis:   Encounter Diagnoses   Name Primary?    Right leg weakness Yes    Gait abnormality      Visit Date: 12/14/2022    Physician: ROSANNA Servin III, MD     Physician Orders: PT Eval and Treat   Medical Diagnosis from Referral:   Z98.890 (ICD-10-CM) - S/P carotid endarterectomy   I63.9 (ICD-10-CM) - Left-sided cerebrovascular accident (CVA)   Evaluation Date: 10/12/2022  Authorization Period Expiration: 9/28/2023  Plan of Care Expiration: 1/13/2022  Visit # / Visits authorized: 4/20 (+ 3 additional visits)  FOTO #: Next  PTA Visit #: 0/5     Time In: 1:00PM  Time Out: 1:45PM  Total Billable Time: 45 minutes (1 NMR + 2 TE)    Precautions: Standard and expressive aphasia     SUBJECTIVE     Patient reports: See updated POC  He was compliant with home exercise program.  Response to previous treatment: mild soreness  Functional change: ongoing    Pain: 0/10  Location: NA      OBJECTIVE     Objective Measures updated at progress report unless specified.     Functional Gait Assessment:     1. Gait on level surface =  3   (3) Normal: less than 5.5 sec, no A.D., no imbalance, normal gait pattern, deviates <6in   (2) Mild impairment: 7-5.6 sec, uses A.D., mild gait deviations, or deviates 6-10 in   (1) Moderate impairment: > 7 sec, slow speed, imbalance, deviates 10-15 in.   (0) Severe impairment: needs assist, deviates >15 in, reach/touch wall  2. Change in Gait Speed = 3   (3) Normal: smooth change w/o loss of balance or gait deviation, deviates < 6 in, significant difference between speeds   (2) Mild impairment: changes speed, but demonstrates mild gait deviations, deviates 6-10 in, OR no deviations but unable to significantly speed, OR uses A.D.   (1) Moderate impairment: minor changes to speed, OR changes speed w/ significant deviations, deviates 10-15 in, OR  Changes speed  , but loses balance & recovers   (0) Severe impairment: cannot change speed, deviates >15 in, or loses balance & needs assist  3. Gait with horizontal head turns  = 2   (3) Normal: no change in gait, deviates <6 in   (2) Mild impairment: slight change in speed, deviates 6-10 in, OR uses A.D.   (1) Moderate impairment: moderate change in speed, deviates 10-15 in   (0) Severe impairment: severe disruption of gait, deviates >15in  4. Gait with vertical head turns = 3   (3) Normal: no change in gait, deviates <6 in   (2) Mild impairment: slight change in speed, deviates 6-10 in OR uses A.D.   (1) Moderate impairment: moderate change in speed, deviates 10-15 in   (0) Severe impairment: severe disruption of gait, deviates >15 in  5. Gait with pivot turns = 3   (3) Normal: performs safely in 3 sec, no LOB   (2) Mild impairment: performs in >3 sec & no LOB, OR turns safely & requires several steps to regain LOB   (1) Moderate impairment: turns slow, OR requires several small steps for balance following turn & stop   (0) Severe impairment: cannot turn safely, needs assist  6. Step over obstacle = 3   (3) Normal: steps over 2 stacked boxes w/o change in speed or LOB   (2) Mild impairment: able to step over 1 box w/o change in speed or LOB   (1) Moderate impairment: steps over 1 box but must slow down, may require VC   (0) Severe impairment: cannot perform w/o assist  7. Gait with Narrow ARTURO = 2   (3) Normal: 10 steps no staggering   (2) Mild impairment: 7-9 steps   (1) Moderate impairment: 4-7 steps   (0) Severe impairment: < 4 steps or cannot perform w/o assist  8. Gait with eyes closed = 3   (3) Normal: < 7 sec, no A.D., no LOB, normal gait pattern, deviates <6 in   (2) Mild impairment: 7.1-9 sec, mild gait deviations, deviates 6-10 in   (1) Moderate impairment: > 9 sec, abnormal pattern, LOB, deviates 10-15 in   (0) Severe impairment: cannot perform w/o assist, LOB, deviates >15in  9. Ambulating Backwards = 2   (3) Normal:  "no A.D., no LOB, normal gait pattern, deviates <6in   (2) Mild impairment: uses A.D., slower speed, mild gait deviations, deviates 6-10 in   (1) Moderate impairment: slow speed, abnormal gait pattern, LOB, deviates 10-15 in   (0) Severe impairment: severe gait deviations or LOB, deviates >15in  10. Steps = 3   (3) Normal: alternating feet, no rail   (2) Mild Impairment: alternating feet, uses rail   (1) Moderate impairment: step-to, uses rail   (0) Severe impairment: cannot perform safely    Score 27/30     Cutoffs:   <22/30 fall risk in older adults  <18/30 fall risk in Parkinsons    MDC/MCID:  Stroke = 4.2 points (MDC)  Vestibular = 6 points (MDC)  Geriatric = 4 points (MCID)  Parkinson's = 4 points (MDC)    Lower Extremity Strength (updated values in parentheses)  Right LE   Left LE     Hip Flexion: 4-/5 (4+/5) Hip Flexion: 4+/5 (5/5)   Hip Abduction: 4-/5 (4+/5) Hip Abduction: 4/5 (5/5)   Knee Extension: 4/5 (4+/5) Knee Extension: 4+/5 (5/5)   Knee Flexion: 4+/5 (4+/5) Knee Flexion: 5/5 (5/5)   Ankle Dorsiflexion: 4/5 (5/5) Ankle Dorsiflexion: 4+/5 (5/5)   Ankle Plantarflexion: 4/5 (4+/5) Ankle Plantarflexion: 5/5 (5/5)      Treatment     Peña received the treatments listed below:      therapeutic exercises to develop strength, endurance, ROM and flexibility for 35 minutes including:    - reciprocal stepper bike x 6 minutes level 5 sprints setting  - standing gastroc stretch 2x45"B  - Cybex hamstring curl; bilateral lower extremity concentric; right lower extremity eccentric 3x10; 4 plates  - Cybex knee extension machine; bilateral lower extremity concentric; right lower extremity eccentric 3x10; 4 plates  - MMT (see above)    neuromuscular re-education activities to improve: Balance, Coordination, Kinesthetic, Sense and Proprioception for 10 minutes. The following activities were included:    - Functional Gait Assessment (see above)      Patient Education and Home Exercises     Home Exercises Provided and " Patient Education Provided     Education provided:   - daily HEP compliance    Written Home Exercises Provided: Patient instructed to cont prior HEP. Exercises were reviewed and Peña was able to demonstrate them prior to the end of the session.  Peña demonstrated good  understanding of the education provided. See EMR under Patient Instructions for exercises provided during therapy sessions    ASSESSMENT     See updated POC    Peña Is progressing well towards his goals.   Pt prognosis is Excellent.     Pt will continue to benefit from skilled outpatient physical therapy to address the deficits listed in the problem list box on initial evaluation, provide pt/family education and to maximize pt's level of independence in the home and community environment.     Pt's spiritual, cultural and educational needs considered and pt agreeable to plan of care and goals.     Anticipated barriers to physical therapy: Chronicity of impairments     Goals: Short Term Goals: 4 weeks   Patient will be compliant with HEP in order to maximize PT benefits (MET)  Patient will improve bilateral lower extremity MMT grades by >/=1/2 grade in order to improve strength for ADL completion (progressing, not met)  Patient will score >/= 23/30 on FGA in order to reduce risk for falls and improve postural control (MET)     Long Term Goals: 8 weeks   Patient will improve bilateral lower extremity MMT grades by >/=1 grade in order to improve strength for ADL completion (progressing, not met)  Patient will score >/= 25/30 on FGA in order to reduce risk for falls and improve postural control (MET)  Patient will score >/= 14 repetitions on 30-Second Sit to  order to improve BLE endurance and muscular power for transfers (MET)  Patient will perform 1 floor <> stand transfer with bilateral upper extremity support in order to demonstrate safe functional mobility in case of future fall (MET during developmental sequencing)  Patient will report 0  falls from initiation of PT management (progressing, not met)  Patient will begin some form of home/community fitness in order to sustain progress gained in PT (MET)    PLAN     See updated POC    RAIN HOGAN, PT

## 2022-12-16 NOTE — PLAN OF CARE
Outpatient Therapy Updated Plan of Care     Visit Date: 12/14/2022  Name: Adarsh Wheeler  Clinic Number: [id number]    Therapy Diagnosis:   Encounter Diagnoses   Name Primary?    Right leg weakness Yes    Gait abnormality      Physician: ROSANNA Servin III, MD    Physician Orders: PT Eval and Treat   Medical Diagnosis from Referral:   Z98.890 (ICD-10-CM) - S/P carotid endarterectomy   I63.9 (ICD-10-CM) - Left-sided cerebrovascular accident (CVA)   Evaluation Date: 10/12/2022    Total Visits Received: 7  Cancelled Visits: 4  No Show Visits: 0    Current Certification Period:  10/12/2022 to 12/9/2022  Precautions:  Standard and expressive aphasia   Visits from Evaluation Date:  6  Functional Level Prior to Evaluation:  modified independent     Subjective     Update: Chief complaints at this point are speech and upper extremity function (being managed by occupational therapy). He still perceives balance deficit and strength deficit of right lower extremity that he would like to work on for the next several weeks in PT. Also says he understands that some deficits may persist post-CVA.    Objective     Update:     Functional Gait Assessment:      1. Gait on level surface =  3              (3) Normal: less than 5.5 sec, no A.D., no imbalance, normal gait pattern, deviates <6in              (2) Mild impairment: 7-5.6 sec, uses A.D., mild gait deviations, or deviates 6-10 in              (1) Moderate impairment: > 7 sec, slow speed, imbalance, deviates 10-15 in.              (0) Severe impairment: needs assist, deviates >15 in, reach/touch wall  2. Change in Gait Speed = 3              (3) Normal: smooth change w/o loss of balance or gait deviation, deviates < 6 in, significant difference between speeds              (2) Mild impairment: changes speed, but demonstrates mild gait deviations, deviates 6-10 in, OR no deviations but unable to significantly speed, OR uses A.D.              (1) Moderate impairment: minor  changes to speed, OR changes speed w/ significant deviations, deviates 10-15 in, OR  Changes speed , but loses balance & recovers              (0) Severe impairment: cannot change speed, deviates >15 in, or loses balance & needs assist  3. Gait with horizontal head turns  = 2              (3) Normal: no change in gait, deviates <6 in              (2) Mild impairment: slight change in speed, deviates 6-10 in, OR uses A.D.              (1) Moderate impairment: moderate change in speed, deviates 10-15 in              (0) Severe impairment: severe disruption of gait, deviates >15in  4. Gait with vertical head turns = 3              (3) Normal: no change in gait, deviates <6 in              (2) Mild impairment: slight change in speed, deviates 6-10 in OR uses A.D.              (1) Moderate impairment: moderate change in speed, deviates 10-15 in              (0) Severe impairment: severe disruption of gait, deviates >15 in  5. Gait with pivot turns = 3              (3) Normal: performs safely in 3 sec, no LOB              (2) Mild impairment: performs in >3 sec & no LOB, OR turns safely & requires several steps to regain LOB              (1) Moderate impairment: turns slow, OR requires several small steps for balance following turn & stop              (0) Severe impairment: cannot turn safely, needs assist  6. Step over obstacle = 3              (3) Normal: steps over 2 stacked boxes w/o change in speed or LOB              (2) Mild impairment: able to step over 1 box w/o change in speed or LOB              (1) Moderate impairment: steps over 1 box but must slow down, may require VC              (0) Severe impairment: cannot perform w/o assist  7. Gait with Narrow ARTURO = 2              (3) Normal: 10 steps no staggering              (2) Mild impairment: 7-9 steps              (1) Moderate impairment: 4-7 steps              (0) Severe impairment: < 4 steps or cannot perform w/o assist  8. Gait with eyes closed = 3               (3) Normal: < 7 sec, no A.D., no LOB, normal gait pattern, deviates <6 in              (2) Mild impairment: 7.1-9 sec, mild gait deviations, deviates 6-10 in              (1) Moderate impairment: > 9 sec, abnormal pattern, LOB, deviates 10-15 in              (0) Severe impairment: cannot perform w/o assist, LOB, deviates >15in  9. Ambulating Backwards = 2              (3) Normal: no A.D., no LOB, normal gait pattern, deviates <6in              (2) Mild impairment: uses A.D., slower speed, mild gait deviations, deviates 6-10 in              (1) Moderate impairment: slow speed, abnormal gait pattern, LOB, deviates 10-15 in              (0) Severe impairment: severe gait deviations or LOB, deviates >15in  10. Steps = 3              (3) Normal: alternating feet, no rail              (2) Mild Impairment: alternating feet, uses rail              (1) Moderate impairment: step-to, uses rail              (0) Severe impairment: cannot perform safely     Score 27/30      Cutoffs:   <22/30 fall risk in older adults  <18/30 fall risk in Parkinsons     MDC/MCID:  Stroke = 4.2 points (MDC)  Vestibular = 6 points (MDC)  Geriatric = 4 points (MCID)  Parkinson's = 4 points (MDC)     Lower Extremity Strength (updated values in parentheses)  Right LE   Left LE     Hip Flexion: 4-/5 (4+/5) Hip Flexion: 4+/5 (5/5)   Hip Abduction: 4-/5 (4+/5) Hip Abduction: 4/5 (5/5)   Knee Extension: 4/5 (4+/5) Knee Extension: 4+/5 (5/5)   Knee Flexion: 4+/5 (4+/5) Knee Flexion: 5/5 (5/5)   Ankle Dorsiflexion: 4/5 (5/5) Ankle Dorsiflexion: 4+/5 (5/5)   Ankle Plantarflexion: 4/5 (4+/5) Ankle Plantarflexion: 5/5 (5/5)      Assessment     Update: Peña is progressing very well in terms of fall risk, dynamic balance, and bilateral lower extremity strength since initiating physical therapy. Several goals achieved thus far. Still with expected but mild deficits in right lower extremity strength and some higher level balance skills. He would benefit from  continued PT services to achieve remaining goals and improve independence with progressed HEP.    Previous Short Term Goals Status:   2/3 met  Long Term Goal Status:  4/6 met  Reasons for Recertification of Therapy:   expiration of current POC    Plan     Updated Certification Period: 12/14/2022 to 1/13/2023  Recommended Treatment Plan: 1 times per week for 4 weeks: Gait Training, Manual Therapy, Moist Heat/ Ice, Neuromuscular Re-ed, Orthotic Management and Training, Patient Education, Self Care, Therapeutic Activities, Therapeutic Exercise, and Modalities PRN  Other Recommendations: Discharge in one month likely    RAIN HOGAN, PT  12/14/2022      I CERTIFY THE NEED FOR THESE SERVICES FURNISHED UNDER THIS PLAN OF TREATMENT AND WHILE UNDER MY CARE    Physician's comments:        Physician's Signature: ___________________________________________________

## 2022-12-22 ENCOUNTER — PATIENT MESSAGE (OUTPATIENT)
Dept: VASCULAR SURGERY | Facility: CLINIC | Age: 57
End: 2022-12-22
Payer: OTHER GOVERNMENT

## 2022-12-22 ENCOUNTER — TELEPHONE (OUTPATIENT)
Dept: REHABILITATION | Facility: HOSPITAL | Age: 57
End: 2022-12-22
Payer: OTHER GOVERNMENT

## 2022-12-22 ENCOUNTER — TELEPHONE (OUTPATIENT)
Dept: VASCULAR SURGERY | Facility: CLINIC | Age: 57
End: 2022-12-22
Payer: OTHER GOVERNMENT

## 2022-12-22 DIAGNOSIS — Z98.890 S/P CAROTID ENDARTERECTOMY: Primary | ICD-10-CM

## 2022-12-22 DIAGNOSIS — I63.9 LEFT-SIDED CEREBROVASCULAR ACCIDENT (CVA): ICD-10-CM

## 2022-12-22 NOTE — TELEPHONE ENCOUNTER
Voice message left for pt's sister with Dr. Servin's response and requesting return call with questions or concerns. Message also sent via My Chart.----- Message from ROSANNA Servin III, MD sent at 12/12/2022  9:55 AM CST -----  See below.    Speech therapy IS the best therapy.   IF that is not actively happening, would suggest that.   Let them know that I discussed with our GI specialists.  ----- Message -----  From: Joseph Delgado MD  Sent: 12/12/2022   9:31 AM CST  To: ROSANNA Servin III, MD    I looked through his chart, So I guess the speech eval was outside  If this is all oropharyngeal dysphagia related to the CVA, then the only person to help would be speech therapy, unless he got to the point, unfortunately, that he needs a PEG tube  for nutrition and or meds/hydration.   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   Can't really eat solid foods, already being seen by speech therapy.    Who is the best service/person to evaluate him for the post-stroke dysphasia ?    Thanks  Chip

## 2022-12-22 NOTE — TELEPHONE ENCOUNTER
Contacted pt's sister in response to message. States she is calling to confirm understanding of Dr. Servin's recommendation. Recommendation discussed, sister verbalized understanding. ----- Message from Bibi Hyman sent at 12/22/2022  1:44 PM CST -----  Regarding: Patient advice  Contact: Elena [phone]  Patient sister Elena franklin josé miguel called from the office in regards to questions about the speech therapy please call to discuss further

## 2022-12-22 NOTE — TELEPHONE ENCOUNTER
LVM with patient's sister regarding missed physical and occupational therapy appointments yesterday. Requested callback with status.    Ellyn Kramer, PT, DPT

## 2022-12-27 ENCOUNTER — PATIENT MESSAGE (OUTPATIENT)
Dept: REHABILITATION | Facility: HOSPITAL | Age: 57
End: 2022-12-27
Payer: OTHER GOVERNMENT

## 2022-12-28 ENCOUNTER — CLINICAL SUPPORT (OUTPATIENT)
Dept: REHABILITATION | Facility: HOSPITAL | Age: 57
End: 2022-12-28
Payer: OTHER GOVERNMENT

## 2022-12-28 DIAGNOSIS — Z98.890 S/P CAROTID ENDARTERECTOMY: ICD-10-CM

## 2022-12-28 DIAGNOSIS — R29.898 RIGHT LEG WEAKNESS: Primary | ICD-10-CM

## 2022-12-28 DIAGNOSIS — R48.2 APRAXIA OF SPEECH: ICD-10-CM

## 2022-12-28 DIAGNOSIS — R27.9 LACK OF COORDINATION: ICD-10-CM

## 2022-12-28 DIAGNOSIS — R47.01 BROCA'S APHASIA: Primary | ICD-10-CM

## 2022-12-28 DIAGNOSIS — M62.81 MUSCLE WEAKNESS: Primary | ICD-10-CM

## 2022-12-28 DIAGNOSIS — R26.9 GAIT ABNORMALITY: ICD-10-CM

## 2022-12-28 DIAGNOSIS — I63.9 LEFT-SIDED CEREBROVASCULAR ACCIDENT (CVA): ICD-10-CM

## 2022-12-28 PROCEDURE — 97110 THERAPEUTIC EXERCISES: CPT | Mod: PN

## 2022-12-28 PROCEDURE — 97112 NEUROMUSCULAR REEDUCATION: CPT | Mod: PN

## 2022-12-28 PROCEDURE — 92507 TX SP LANG VOICE COMM INDIV: CPT | Mod: PN

## 2022-12-28 PROCEDURE — 97530 THERAPEUTIC ACTIVITIES: CPT | Mod: PN

## 2022-12-28 NOTE — PROGRESS NOTES
[facility]  Speech Therapy Treatment Note- Neurological Rehabilitation    Date: 12/28/2022     Name: Adarsh Wheeler   MRN: [id number]   Therapy Diagnosis:   Encounter Diagnoses   Name Primary?    Broca's aphasia Yes    Apraxia of speech      Physician: Kush Serrano MD  Physician Orders: [id number] - AMB REFERRAL/CONSULT TO SPEECH THERAPY  Medical Diagnosis: I63.9 (ICD-10-CM) - Cerebral infarction, unspecified    Visit #/ Visits Authorized: 11/ 15  Date of Evaluation:  7/18/2022   Insurance Authorization Period:  6/14/22- 12/11/22   Plan of Care Expiration Date:    9/12/22, 10/28/2022, 12/23/2022  Extended Plan of Care:  yes, on 10/26/2022  Progress Note: 11/26/2022  Visits Cancelled: 0  Visits No Show: 0    Time In:  1405  Time Out:  1445  Total Billable Time: 40 minutes     Precautions: Standard  Subjective:   Patient reports: he is noticing new improvements with his speech and is happy with the progress. Provided patient with an aphasia card for his wallet.   He was compliant to home exercise program.   Response to previous treatment: improvement in word finding and verbal fluency   Pain Scale:  4/10 on a Visual Analog Scale currently.   Pain Location: right arm  Objective:        UNTIMED  Procedure Min.   Speech- Language- Voice Therapy  40   Total Timed Units: 0  Total Untimed Units: 1  Charges Billed/Number of units: 1    Short Term Goals: (4 weeks) Current Progress:   1. Pt will complete Verb Network Strengthening Training for 4 verbs per session given minimal cues.     Progressing/ Not Met 12/28/2022    Verb Network Strength Training (VNEST) was introduced today. VNEST focuses on verbs, encouraging people to think of people who perform actions (agents) and the objects or the actions that are performed on (patients). The idea is that by focusing on verbs, which require connection to nouns, you can strengthen all the words in the mental network around the verb. In this therapy technique,  there are 6 steps each verb is taken through.   Step 1: Pt provided 1/3  subjects and objects in verb triads IND'ly, 2/3  given cues.   Step 2: Pt read triads of words aloud with 100 % acc .  Step 3: pt expanded where with 33% acc indly, 66% acc given min cues, when with 33% acc, 66% acc given min cues.  Step 4: pt identified whether sentences presented auditorily were correct or incorrect -Not addressed this session.   Step 5: pt recalled the target verb with 1/1 acc   Step 6: not addressed      2. Pt will follow attentive reading and constraint summarization with moderate cues for story retell with given constraints.     Progressing/ Not Met 2022    Met x2   3. Pt will follow complex multiunit commands with 90% accuracy independently.     Progressing/ Not Met 2022    Followed 2 step commands with 80% acc Independently; 100% acc min A    Followed multi unit commands with 20% acc Independently; 40% acc mod A. Pt would forget parts of the direction despite strategies.    5. Pt will complete verbal reasoning tasks with minimal cues for accuracy.      Progressing/ Not Met 2022    -Not addressed this session.     Met x1   6. Patient will complete Combined Aphasia and Apraxia of Speech Treatment for 5 verbs independently.    Progressing/Not Met 2022  Completed for 10 verbs with minimal cues and mildly extended time     Patient was able to speed up verbal expression with cues    Increased difficulty with distractions       Patient Education/Response:   Patient educated regarding the followin. Neuroanatomy of his stroke and associated deficits  2. Expected trajectory of recovery    Patient verbalized understanding to all above education provided.     Home program established: yes-Patient to utilize strategies in conversation   Exercises were reviewed and Peña was able to demonstrate them prior to the end of the session.  Peña demonstrated good  understanding of the education provided.     See  Electronic Medical Record under Patient Instructions for exercises provided throughout therapy.  Assessment:   Peña is progressing well towards his goals. Patient with excellent engagement  throughout the session. Increase in verbal fluency noted today and functionally.  Patient considering transferring to [facility] as it is closer to his home.  Current goals remain appropriate. Goals to be updated as necessary.     Patient prognosis is Good. Patient will continue to benefit from skilled outpatient speech and language therapy to address the deficits listed in the problem list on initial evaluation, provide patient/family education and to maximize patient's level of independence in the home and community environment.   Medical necessity is demonstrated by the following IMPAIRMENTS:  Deficits in executive functioning and memory prevent the patient performing his work and personal daily activities effectively and efficiently which may put him at risk of unsafe behavior and a decline in quality of life.  Barriers to Therapy: transportation  Patient's spiritual, cultural and educational needs considered and patient agreeable to plan of care and goals.  Plan:   Continue Plan of Care with focus on communication deficits. Patient considering transferring to [facility] as it is closer to his home.      Su Perry CCC-SLP   12/28/2022

## 2022-12-28 NOTE — PROGRESS NOTES
"  [facility]   Physical Therapy Treatment Note     Name: Adarsh Wheeler  [facility] Number: [id number]    Therapy Diagnosis:   Encounter Diagnoses   Name Primary?    S/P carotid endarterectomy     Left-sided cerebrovascular accident (CVA)     Right leg weakness Yes    Gait abnormality        Visit Date: 12/28/2022    Physician: ROSANNA Servin III, MD     Physician Orders: PT Eval and Treat   Medical Diagnosis from Referral:   Z98.890 (ICD-10-CM) - S/P carotid endarterectomy   I63.9 (ICD-10-CM) - Left-sided cerebrovascular accident (CVA)   Evaluation Date: 10/12/2022  Authorization Period Expiration: 9/28/2023  Plan of Care Expiration: 1/13/2022  Visit # / Visits authorized: 5/20 (+ 3 additional visits)  FOTO #: Next  PTA Visit #: 0/5     Time In: 12:22PM  Time Out: 1:00PM  Total Billable Time: 38 minutes (1 NMR + 2 TE)    Precautions: Standard and expressive aphasia     SUBJECTIVE     Patient reports: Agreeable to discharge from PT next visit. Understands role of PT in management of lower extremity strength/balance. No new falls noted. Able to get around his property in [place] without issue and has been keeping up with exercise at home regularly. May be interested in joining gym in the future.  He was compliant with home exercise program.  Response to previous treatment: No adverse response  Functional change: Ongoing    Pain: 0/10  Location: NA      OBJECTIVE     Objective Measures updated at progress report unless specified.      Treatment     Peña received the treatments listed below:      therapeutic exercises to develop strength, endurance, ROM and flexibility for 25 minutes including:    - reciprocal stepper bike x 6 minutes level 5.5 sprints setting  - standing gastroc stretch 3x45"B  - single leg heel raises right lower extremity 2x15  - reciprocal step ups to 6" step with contralateral hip thruster 4# ankle 4x45"    neuromuscular re-education activities to improve: Balance, " "Coordination, Kinesthetic, Sense and Proprioception for 13 minutes. The following activities were included:    - Dorsiflexion walks x 4 lengths x 10 feet each  - Obstacle course overground without upper extremity support (6" step + airex beam + 5 hurdles); 4 lengths forward; 2 lengths laterally       Patient Education and Home Exercises     Home Exercises Provided and Patient Education Provided     Education provided:   - daily HEP compliance    Written Home Exercises Provided: Patient instructed to cont prior HEP. Exercises were reviewed and Peña was able to demonstrate them prior to the end of the session.  Peña demonstrated good  understanding of the education provided. See EMR under Patient Instructions for exercises provided during therapy sessions    ASSESSMENT     Peña tolerated PT visit without adverse response. Good ankle and stepping strategies noted during overground obstacle course. Appropriate levels of fatigue achieved with all activity and patient able to progress to single leg heel raise with good form noted. Due to excellent progress in PT coupled with capacity for independence with home/community exercise, he will likely be appropriate for discharge next session with updated HEP.    Peña Is progressing well towards his goals.   Pt prognosis is Excellent.     Pt will continue to benefit from skilled outpatient physical therapy to address the deficits listed in the problem list box on initial evaluation, provide pt/family education and to maximize pt's level of independence in the home and community environment.     Pt's spiritual, cultural and educational needs considered and pt agreeable to plan of care and goals.     Anticipated barriers to physical therapy: Chronicity of impairments     Goals: Short Term Goals: 4 weeks   Patient will be compliant with HEP in order to maximize PT benefits (MET)  Patient will improve bilateral lower extremity MMT grades by >/=1/2 grade in order to improve strength " for ADL completion (progressing, not met)  Patient will score >/= 23/30 on FGA in order to reduce risk for falls and improve postural control (MET)     Long Term Goals: 8 weeks   Patient will improve bilateral lower extremity MMT grades by >/=1 grade in order to improve strength for ADL completion (progressing, not met)  Patient will score >/= 25/30 on FGA in order to reduce risk for falls and improve postural control (MET)  Patient will score >/= 14 repetitions on 30-Second Sit to  order to improve BLE endurance and muscular power for transfers (MET)  Patient will perform 1 floor <> stand transfer with bilateral upper extremity support in order to demonstrate safe functional mobility in case of future fall (MET during developmental sequencing)  Patient will report 0 falls from initiation of PT management (progressing, not met)  Patient will begin some form of home/community fitness in order to sustain progress gained in PT (MET)    PLAN     Likely discharge next with updated HEP    RAIN HOGAN, PT

## 2022-12-28 NOTE — PROGRESS NOTES
"Occupational Therapy Daily Treatment Note     Name: Adarsh Wheeler  [facility] Number: [id number]    Therapy Diagnosis:   Encounter Diagnoses   Name Primary?    Muscle weakness Yes    Lack of coordination          Physician: Stefan Woods*    Visit Date: 12/28/2022    Physician Orders: OT eval and treat   Medical Diagnosis: R53.1 (ICD-10-CM) - Right sided weakness I63.9 (ICD-10-CM) - Left-sided cerebrovascular accident (CVA)   Evaluation Date: 9/27/2022  Plan of Care Expiration Period: 1/20/2023  Insurance Authorization period Expiration: 11/30/2022  Visit # / Visits Authorized: 10 / 20  FOTO: 9/27/2022     Time In: 1:00  Time Out: 1:45  Total Billable (one on one) Time: 45 minutes     Precautions: Standard and Fall    Subjective     Pt reports: "I have difficulty putting linens away" "I have curtains to put up that are hard"  he was compliant with home exercise program given last session.   Response to previous treatment:good   Functional change: decreased pain     Pain: 0/10  Location: right upper extremity     Objective     Physical Exam:  Postural examination/scapula alignment: Affected scapula upwardly rotated  Joint integrity: Firm end feeling  Skin integrity: intact   Edema: none present and pt does not report edema in upper extremities    Palpation: no pain with palpation      Joint Evaluation  AROM  9/27/2022 MMS   9/27/2022 AROM  11/2/2022 MMS  11/2/2022 AROM  12/7/2022 MMS  12/7/2022     Right Right Right Right  Right  Right    Shoulder Flex 0-180 109 3-/5 120 3+/5 123 3+/5   Shoulder Extension 0-80 59 3-/5 70 3+/5 70 4/5   Shoulder Abd 0-180 125 3-/5 111 4-/5 122 4/5   Shoulder ER 0-90 56 3-/5 60 3+/5 71 3+/5   Shoulder IR 0-90 S2 3-/5 L2 4/5 S2 4/5   Shoulder Horizontal adduction 0-90 35 3-/5 50 4/5 35 4/5   Elbow Flex/Ext 0-150 130/0 3/5  4/5  5/5   Wrist Flex 0-80 65 3-/5  4+/5  5/5   Wrist Ext 0-70 50 3-/5  4+/5  5/5   Supination 0-80 WNL 3/5  5/5     Pronation 0-80 WNL 3/5  5/5     Ulnar " Deviation   3/5  5/5     Radial Deviation    3/5  5/5     *WNL - Within Normal Limits  *NT = Not Tested     Fist: normal      Strength: (ADRINANA Dynamometer in lbs.) Average 3 trials, Position II:       9/27/2022 9/27/2022 11/2/2022 12/7/2022   Rung II Right Left Right  Right    Trial 1 34# 85# 44# 38#   Trial 2 39# 91# 41# 42#   Trial 3 40# 101# 43# 44#   Average 37.6# 92.3# 42# 41#      Normal  Average Values  Female Right Left Male: Right Left   20-29 66 lbs 62 lbs         94 lbs 86 lbs   30-39 68 lbs 64 lbs 90 lbs 82 lbs   40-49 64 lbs 62 lbs 80 lbs 74 lbs   50-59 62 lbs 57 lbs 72 lbs 65 lbs   60-69 53 lbs 51 lbs 63 lbs 56 lbs   70+ 44 lbs 42 lbs 54 lbs 48 lbs   SD = +/- 19lbs         Pinch Strength (Measured in lbs)       9/27/2022 9/27/2022 11/2/2022 12/7/2022     Right Left Right  Right    Key Pinch 15 # 33.5 # 14# 14#   3pt Pinch 13 # 24 # 11# 12#   2pt Pinch 16 # 23 # 14# 11#         Fine/Gross Motor Coordination     Assessment   Right   9/27/2022 Left  9/27/2022 Right  11/2/2022 Right   12/7/2022   9 hole peg test 9 pegs in/out for time 1:30 0:34 1:22 1:22   *WNL - Within Normal Limits  *NT = Not Tested     Tone:  Modified Terra Scale:   1-  Slight increase in muscle tone, manifested by a catch and release or by minimal resistance at the end of the range of motion when the affected part(s) is moved in flexion or extension     Sensation:  Adarsh JIGNESH III  reports numbness on right side       Sensation Intact  Impaired Comments    [place] Debra  Deep Touch (6.65) [x]  []       Protective Sensation (4.56) [x]  []       Light Touch (3.61) []  [x]  5/7 trials detected, difficulty with median nerve innervation                 Other Kinesthesia  [x]  []       Temperature [x]  []       Stereognosis  []  []         Balance:   Static Sit - NORMAL: No deviations seen in posture held statically  Dynamic sit- NORMAL: No deviations seen in posture held statically  Static Stand - FAIR+: Able to take MINIMAL  challenges from all directions  Dynamic stand - FAIR+: Able to take MINIMAL challenges from all directions     Endurance Deficit: moderate    Peña received therapeutic exercises to develop strength, endurance, ROM, flexibility, and posture for 8 minutes including:  Upper Body Ergometer (UBE) L3 for 8 minutes to provide proprioceptive awareness, postural stability, strength, activity tolerance, and reciprocal patterns with bimanual coordination completed to improve use of bilateral upper extremities in order to prepare for therapeutic exercises/activities. Cues provided as needed for postural stability/positioning    Peña participated in dynamic functional therapeutic activities to improve functional performance for 37  minutes, including:  - discussion about prognosis, baseline, goals, functional level, purpose of OT  - functional activities including    - laundry/folding linens    - reaching overhead and placing linens on shelf overhead    - clothespin activity to simulate curtain management     Home Exercises and Education Provided     Education provided:   - scapular exercises + dowel movements   - postural and active assisted range of motion exercises   - Progress towards goals     Written Home Exercises Provided: yes.  Exercises were reviewed and Peña was able to demonstrate them prior to the end of the session.  Peña demonstrated good  understanding of the HEP provided.   .   See EMR under Patient Instructions for exercises provided 10/5/2022. 12/14/2022    Assessment     Peña tolerated today's session well. Therapist discussed remaining goals, progress to date, purpose of OT, baseline/prognosis of stroke recovery and functional level of pt to date. Peña reported he has a hard time with overhead activities, specifically putting linens on top shelf and hanging his curtains up after washing them which he says is 1-2x a month. Therapist educated on residual impairments after a stroke, adapting the environment to  increase accessibility and safety and understanding limitations and asking for help when needed. Peña was able to fold sheets, pillow cases and towels independently, noted compensation with left upper extremity completing most of the task. Therapist provided cues for pt to incorporate right more and Peña says he does at home. However, limited carry over during session. He was able to carry linens across gym and place on high shelf using left hand. Therapist educated on using bilateral upper extremities as and active assist to increase ease while incorporating  right upper extremity. He verbalized understanding. Noted fatigue in right upper extremity with clothespin activity and difficulty stabilizing. Therapist suggested environmental modifications, positioning and strategies to increase ease and recommended he have a family member present for safety. Increased time for all tasks. No difficulty with squeezing clothespin or bimanual coordination. Will focus remaining visits on overhead and fine motor coordination activities.     Peña is progressing well towards his goals and there are no updates to goals at this time. Pt prognosis is Good.     Pt will continue to benefit from skilled outpatient occupational therapy to address the deficits listed in the problem list on initial evaluation provide pt/family education and to maximize pt's level of independence in the home and community environment.     Anticipated barriers to occupational therapy: none    Pt's spiritual, cultural and educational needs considered and pt agreeable to plan of care and goals.    Goals:   Short Term Goals: 5 weeks  - Pt will demonstrate right shoulder flexion/abduction to 140* or better in order to improve participation with reaching tasks Not met, progressing  - Pt will increase right  strength to 45# or better in order to increase ease with housekeeping and cooking tasks. Not met, progressing  - Pt will improve right key/3pt/2pt pinch  strength to 20#/15#/17# in order to improve ease with buttons, zippers, opening containers, etc Not met, progressing     Long Term Goals: 10 weeks  - Pt will improve FOTO score by 5 points or better in order to demonstrate improved use of right upper extremity with daily tasks Not met, progressing  - Pt will improve right manual muscle strength to 4/5 or better in all planes in order to return to prior level of function Not met, progressing  - Pt will increase right  strength to 60# or better in order to increase ease with housekeeping and cooking tasks. Not met, progressing  - Pt will improve right key/3pt/2pt pinch strength to 25#/18#/18# in order to improve ease with buttons, zippers, opening containers, etc  Not met, progressing  - Pt will complete 9 hole peg assessment in 1 minute or less using right upper extremity in order to improve dexterity and gross motor coordination during dressing and leisure tasks Not met, progressing  - Pt will manage various sized containers with increased ease using bilateral upper extremities and adaptive equipment as needed in order to improve independence with cooking tasks. Not met, progressing      Plan     Updates/Grading for next session: continue with POC Corinne Rapier, OT   12/28/2022

## 2023-01-04 ENCOUNTER — CLINICAL SUPPORT (OUTPATIENT)
Dept: REHABILITATION | Facility: HOSPITAL | Age: 58
End: 2023-01-04
Payer: OTHER GOVERNMENT

## 2023-01-04 DIAGNOSIS — R27.9 LACK OF COORDINATION: ICD-10-CM

## 2023-01-04 DIAGNOSIS — R26.9 GAIT ABNORMALITY: ICD-10-CM

## 2023-01-04 DIAGNOSIS — M62.81 MUSCLE WEAKNESS: Primary | ICD-10-CM

## 2023-01-04 DIAGNOSIS — R29.898 RIGHT LEG WEAKNESS: Primary | ICD-10-CM

## 2023-01-04 PROCEDURE — 97110 THERAPEUTIC EXERCISES: CPT | Mod: PN

## 2023-01-04 NOTE — PATIENT INSTRUCTIONS
Complete 2 sets of 15 reps 1-2x a day   Using cane, broom stick or 2-3# dowel                                     Rules with the putty:   Do not leave it in the sun/car  Do not get it on fabric/clothes, it will not come out  Do not let children/pets play with it because it can be toxic

## 2023-01-04 NOTE — PROGRESS NOTES
[facility]   Physical Therapy Treatment Note / Discharge Summary    Name: Adarsh Wheeler  Clinic Number: [id number]    Therapy Diagnosis:   Encounter Diagnoses   Name Primary?    Right leg weakness Yes    Gait abnormality      Visit Date: 1/4/2023    Physician: ROSANNA Servin III, MD     Physician Orders: PT Eval and Treat   Medical Diagnosis from Referral:   Z98.890 (ICD-10-CM) - S/P carotid endarterectomy   I63.9 (ICD-10-CM) - Left-sided cerebrovascular accident (CVA)   Evaluation Date: 10/12/2022  Authorization Period Expiration: 9/28/2023  Plan of Care Expiration: 1/13/2022  Visit # / Visits authorized: 1/20 (+ 8 additional visits)  PTA Visit #: 0/5     Time In: 12:15PM  Time Out: 12:57PM  Total Billable Time: 42 minutes (3TE)    Precautions: Standard and expressive aphasia     SUBJECTIVE     Patient reports: Agreeable to discharge from PT with SLP services being completed closer to home. Verbalizes understanding of HEP.  He was compliant with home exercise program.  Response to previous treatment: No adverse response  Functional change: See goals    Pain: 0/10  Location: NA      OBJECTIVE     Objective Measures updated at progress report unless specified.     Lower Extremity Strength (updated values in parentheses)  Right LE   Left LE     Hip Flexion: 4-/5 (4+/5) Hip Flexion: 4+/5 (5/5)   Hip Abduction: 4-/5 (4+/5) Hip Abduction: 4/5 (5/5)   Knee Extension: 4/5 (5/5) Knee Extension: 4+/5 (5/5)   Knee Flexion: 4+/5 (5/5) Knee Flexion: 5/5 (5/5)   Ankle Dorsiflexion: 4/5 (5/5) Ankle Dorsiflexion: 4+/5 (5/5)   Ankle Plantarflexion: 4/5 (5/5) Ankle Plantarflexion: 5/5 (5/5)      Treatment     Peña received the treatments listed below:      therapeutic exercises to develop strength, endurance, ROM and flexibility for 42 minutes including:    - strength reassessment (see above)  - reciprocal stepper bike x 6 minutes level 5.5 sprints setting  - runner's gastroc and soleusstretch for  "HEP review x45" each  - single leg heel raises right lower extremity for HEP review 3x10  - dorsiflexion raises for HEP review 3x10  - standing hip abduction for HEP review 3x10B green theraband   - hamstring curl machine 3x10 4.5 plates for gym simulation  - knee extension machine x15 reps bilateral lower extremity (40#) and right lower extremity only x15 reps (15#) for gym simulation  - patient education on strengthening parameters / return to gym safety    Patient Education and Home Exercises     Home Exercises Provided and Patient Education Provided     Education provided:   - daily HEP compliance    Written Home Exercises Provided: Patient instructed to cont prior HEP. Exercises were reviewed and Peña was able to demonstrate them prior to the end of the session.  Peña demonstrated good  understanding of the education provided. See EMR under Patient Instructions for exercises provided during therapy sessions    ASSESSMENT     Peña has achieved all functional goals since initiating physical therapy indicating improvements in right lower extremity strength, fall risk, higher level balance skills, bilateral lower extremity muscular power, and independence with physical activity. Due to excellent progress toward goals coupled with capacity for continued independence with updated HEP/possible return to gym, he is appropriate for discharge at this time. He understands that he can contact PT with any questions/concerns post-discharge.    Peña Is progressing well towards his goals.   Pt prognosis is Excellent.     Pt's spiritual, cultural and educational needs considered and pt agreeable to plan of care and goals.     Anticipated barriers to physical therapy: Chronicity of impairments     Goals: Short Term Goals: 4 weeks   Patient will be compliant with HEP in order to maximize PT benefits (MET)  Patient will improve bilateral lower extremity MMT grades by >/=1/2 grade in order to improve strength for ADL completion " (MET)  Patient will score >/= 23/30 on FGA in order to reduce risk for falls and improve postural control (MET)     Long Term Goals: 8 weeks   Patient will improve bilateral lower extremity MMT grades by >/=1 grade in order to improve strength for ADL completion (MET)  Patient will score >/= 25/30 on FGA in order to reduce risk for falls and improve postural control (MET)  Patient will score >/= 14 repetitions on 30-Second Sit to  order to improve BLE endurance and muscular power for transfers (MET)  Patient will perform 1 floor <> stand transfer with bilateral upper extremity support in order to demonstrate safe functional mobility in case of future fall (MET during developmental sequencing)  Patient will report 0 falls from initiation of PT management (MET)  Patient will begin some form of home/community fitness in order to sustain progress gained in PT (MET)    PLAN     Discharge PT    RAIN HOGAN, PT

## 2023-01-04 NOTE — PROGRESS NOTES
"Occupational Therapy Daily Treatment Note and Discharge Summary     Name: Adarsh Wheeler  [facility] Number: [id number]    Therapy Diagnosis:   Encounter Diagnoses   Name Primary?    Muscle weakness Yes    Lack of coordination        Physician: Kush Serrano MD    Visit Date: 1/4/2023    Physician Orders: OT eval and treat   Medical Diagnosis: R53.1 (ICD-10-CM) - Right sided weakness I63.9 (ICD-10-CM) - Left-sided cerebrovascular accident (CVA)   Evaluation Date: 9/27/2022  Plan of Care Expiration Period: 1/20/2023  Insurance Authorization period Expiration: 11/30/2022  Visit # / Visits Authorized: 11 / 14  FOTO: 9/27/2022     Time In: 1:00  Time Out: 1:45  Total Billable (one on one) Time: 45 minutes     Precautions: Standard and Fall    Subjective     Pt reports: "I am ok. I have a hard time coordinating my arm and my mouth"  he was compliant with home exercise program given last session.   Response to previous treatment:good   Functional change: no major changes to report     Pain: 0/10  Location: right upper extremity     Objective     Physical Exam:  Postural examination/scapula alignment: Affected scapula upwardly rotated  Joint integrity: Firm end feeling  Skin integrity: intact   Edema: none present and pt does not report edema in upper extremities    Palpation: no pain with palpation      Joint Evaluation  AROM  9/27/2022 MMS   9/27/2022 AROM  11/2/2022 MMS  11/2/2022 AROM  12/7/2022 MMS  12/7/2022 AROM  1/4/2023 MMS  1/4/2023     Right Right Right Right  Right  Right  Right  Right    Shoulder Flex 0-180 109 3-/5 120 3+/5 123 3+/5 130 4-/5   Shoulder Extension 0-80 59 3-/5 70 3+/5 70 4/5 70 4/5   Shoulder Abd 0-180 125 3-/5 111 4-/5 122 4/5 105 4/5   Shoulder ER 0-90 56 3-/5 60 3+/5 71 3+/5 70 4/5   Shoulder IR 0-90 S2 3-/5 L2 4/5 S2 4/5 S2 4+/5   Shoulder Horizontal adduction 0-90 35 3-/5 50 4/5 35 4/5 38 4/5   Elbow Flex/Ext 0-150 130/0 3/5  4/5  5/5  5/5   Wrist Flex 0-80 65 3-/5  4+/5  5/5  5/5 "   Wrist Ext 0-70 50 3-/5  4+/5  5/5  5/5   Supination 0-80 WNL 3/5  5/5  5/5  5/5   Pronation 0-80 WNL 3/5  5/5  5/5  5/5   Ulnar Deviation   3/5  5/5  5/5  5/5   Radial Deviation    3/5  5/5  5/5  5/5   *WNL - Within Normal Limits  *NT = Not Tested   * manual muscle strength is within Peña's range of motion   Fist: normal      Strength: (ADRIANNA Dynamometer in lbs.) Average 3 trials, Position II:       9/27/2022 9/27/2022 11/2/2022 12/7/2022 1/4/2023   Rung II Right Left Right  Right  Right    Trial 1 34# 85# 44# 38# 40#   Trial 2 39# 91# 41# 42# 45#   Trial 3 40# 101# 43# 44# 43#   Average 37.6# 92.3# 42# 41# 43#      Normal  Average Values  Female Right Left Male: Right Left   20-29 66 lbs 62 lbs         94 lbs 86 lbs   30-39 68 lbs 64 lbs 90 lbs 82 lbs   40-49 64 lbs 62 lbs 80 lbs 74 lbs   50-59 62 lbs 57 lbs 72 lbs 65 lbs   60-69 53 lbs 51 lbs 63 lbs 56 lbs   70+ 44 lbs 42 lbs 54 lbs 48 lbs   SD = +/- 19lbs         Pinch Strength (Measured in lbs)       9/27/2022 9/27/2022 11/2/2022 12/7/2022 1/4/2023     Right Left Right  Right  Right    Loving Pinch 15 # 33.5 # 14# 14# 14.#   3pt Pinch 13 # 24 # 11# 12# 14#   2pt Pinch 16 # 23 # 14# 11# 14#         Fine/Gross Motor Coordination     Assessment   Right   9/27/2022 Left  9/27/2022 Right  11/2/2022 Right   12/7/2022 Right   1/4/2023   9 hole peg test 9 pegs in/out for time 1:30 0:34 1:22 1:22 1:23   *WNL - Within Normal Limits  *NT = Not Tested     Tone:  Modified Terra Scale:   1-  Slight increase in muscle tone, manifested by a catch and release or by minimal resistance at the end of the range of motion when the affected part(s) is moved in flexion or extension     Sensation:  Adarsh EGAN III  reports numbness on right side       Sensation Intact  Impaired Comments    [place] Debra  Deep Touch (6.65) [x]  []       Protective Sensation (4.56) [x]  []       Light Touch (3.61) []  [x]  5/7 trials detected, difficulty with median nerve innervation                  Other Kinesthesia  [x]  []       Temperature [x]  []       Stereognosis  []  []         Balance:   Static Sit - NORMAL: No deviations seen in posture held statically  Dynamic sit- NORMAL: No deviations seen in posture held statically  Static Stand - FAIR+: Able to take MINIMAL challenges from all directions  Dynamic stand - FAIR+: Able to take MINIMAL challenges from all directions     Endurance Deficit: moderate    Peña received therapeutic exercises to develop strength, endurance, ROM, flexibility, and posture for 45 minutes including:  Upper Body Ergometer (UBE) L3 for 8 minutes to provide proprioceptive awareness, postural stability, strength, activity tolerance, and reciprocal patterns with bimanual coordination completed to improve use of bilateral upper extremities in order to prepare for therapeutic exercises/activities. Cues provided as needed for postural stability/positioning  - reviewed home exercise program    - supine dowel shoulder flexion x15 2# dowel    - supine chest press 2# dowel x15   - supine external rotation x15 2# dowel    - scap protraction/retraction x15    - sidelying shoulder abduction/adduction x15    - sidelying external rotation x15   - red theraband shoulder extension    - red theraband shoulder rows    - blue putty - gross grasp, key/2pt/3pt pinch x5 each   - reassessment and discharge discussion     Home Exercises and Education Provided     Education provided:   - blue putty issued   - scapular exercises + dowel movements   - postural and active assisted range of motion exercises   - Progress towards goals     Written Home Exercises Provided: yes.  Exercises were reviewed and Peña was able to demonstrate them prior to the end of the session.  Peña demonstrated good  understanding of the HEP provided.   .   See EMR under Patient Instructions for exercises provided 10/5/2022. 12/14/2022, 1/4/2023    Assessment     Peña has made fair progress with remaining goals. He has  demonstrated mild improvements in active range of motion,  and pinch strength since evaluation, however, due to chronicity of impairments, he exhibits a plateau in progress. Due to baseline/plateau, Peña is being discharged from OT at this time with home exercise program issued and reviewed this session. Peña states he is completing daily tasks with mod I, requiring increased time and rest breaks due to fatigue. Therapist educated about healing and adjustments in routines/habits after a stroke. Peña verbalized understanding. No questions end of session. Thank you for your referral. He is going to continue with speech therapy for swallowing, communication and cognitive concerns.     Anticipated barriers to occupational therapy: none    Pt's spiritual, cultural and educational needs considered and pt agreeable to plan of care and goals.    Goals:   Short Term Goals: 5 weeks  - Pt will demonstrate right shoulder flexion/abduction to 140* or better in order to improve participation with reaching tasks Not met 1/4/2023  - Pt will increase right  strength to 45# or better in order to increase ease with housekeeping and cooking tasks. Not met 1/4/2023  - Pt will improve right key/3pt/2pt pinch strength to 20#/15#/17# in order to improve ease with buttons, zippers, opening containers, etc Not met 1/4/2023     Long Term Goals: 10 weeks  - Pt will improve FOTO score by 5 points or better in order to demonstrate improved use of right upper extremity with daily tasks Not met 1/4/2023  - Pt will improve right manual muscle strength to 4/5 or better in all planes in order to return to prior level of function Not met 1/4/2023  - Pt will increase right  strength to 60# or better in order to increase ease with housekeeping and cooking tasks. Not met 1/4/2023  - Pt will improve right key/3pt/2pt pinch strength to 25#/18#/18# in order to improve ease with buttons, zippers, opening containers, etc  Not met 1/4/2023  - Pt  will complete 9 hole peg assessment in 1 minute or less using right upper extremity in order to improve dexterity and gross motor coordination during dressing and leisure tasks Not met 1/4/2023  - Pt will manage various sized containers with increased ease using bilateral upper extremities and adaptive equipment as needed in order to improve independence with cooking tasks. Met 1/4/2023      Plan     Updates/Grading for next session: continue with POC    Corinne Rapier, OT   1/4/2023

## 2023-01-30 ENCOUNTER — PATIENT MESSAGE (OUTPATIENT)
Dept: VASCULAR SURGERY | Facility: CLINIC | Age: 58
End: 2023-01-30
Payer: OTHER GOVERNMENT

## 2023-02-02 ENCOUNTER — PATIENT MESSAGE (OUTPATIENT)
Dept: PRIMARY CARE CLINIC | Facility: CLINIC | Age: 58
End: 2023-02-02
Payer: OTHER GOVERNMENT

## 2023-02-10 ENCOUNTER — OFFICE VISIT (OUTPATIENT)
Dept: PRIMARY CARE CLINIC | Facility: CLINIC | Age: 58
End: 2023-02-10
Payer: OTHER GOVERNMENT

## 2023-02-10 VITALS
HEART RATE: 72 BPM | SYSTOLIC BLOOD PRESSURE: 110 MMHG | BODY MASS INDEX: 27.21 KG/M2 | TEMPERATURE: 99 F | WEIGHT: 190.06 LBS | DIASTOLIC BLOOD PRESSURE: 80 MMHG | HEIGHT: 70 IN | OXYGEN SATURATION: 97 % | RESPIRATION RATE: 18 BRPM

## 2023-02-10 DIAGNOSIS — B37.0 ORAL THRUSH: ICD-10-CM

## 2023-02-10 DIAGNOSIS — R05.3 CHRONIC COUGH: Primary | ICD-10-CM

## 2023-02-10 DIAGNOSIS — K21.9 GASTROESOPHAGEAL REFLUX DISEASE WITHOUT ESOPHAGITIS: ICD-10-CM

## 2023-02-10 PROCEDURE — 99214 OFFICE O/P EST MOD 30 MIN: CPT | Mod: PBBFAC | Performed by: INTERNAL MEDICINE

## 2023-02-10 PROCEDURE — 99214 PR OFFICE/OUTPT VISIT, EST, LEVL IV, 30-39 MIN: ICD-10-PCS | Mod: S$PBB,,, | Performed by: INTERNAL MEDICINE

## 2023-02-10 PROCEDURE — 99999 PR PBB SHADOW E&M-EST. PATIENT-LVL IV: CPT | Mod: PBBFAC,,, | Performed by: INTERNAL MEDICINE

## 2023-02-10 PROCEDURE — 99999 PR PBB SHADOW E&M-EST. PATIENT-LVL IV: ICD-10-PCS | Mod: PBBFAC,,, | Performed by: INTERNAL MEDICINE

## 2023-02-10 PROCEDURE — 99214 OFFICE O/P EST MOD 30 MIN: CPT | Mod: S$PBB,,, | Performed by: INTERNAL MEDICINE

## 2023-02-10 RX ORDER — NYSTATIN 100000 [USP'U]/ML
6 SUSPENSION ORAL 4 TIMES DAILY
Qty: 240 ML | Refills: 0 | Status: SHIPPED | OUTPATIENT
Start: 2023-02-10 | End: 2023-02-20

## 2023-02-10 RX ORDER — PANTOPRAZOLE SODIUM 40 MG/1
40 TABLET, DELAYED RELEASE ORAL DAILY
Qty: 30 TABLET | Refills: 0 | Status: SHIPPED | OUTPATIENT
Start: 2023-02-10 | End: 2023-03-15 | Stop reason: SDUPTHER

## 2023-02-10 NOTE — PROGRESS NOTES
[facility] Note    Chief Complaint      Chief Complaint   Patient presents with    Shortness of Breath    Cough     Since nov        History of Present Illness      Adarsh Wheeler is a 57 y.o. male who presents today for   Chief Complaint   Patient presents with    Shortness of Breath    Cough     Since nov    .  Patient comes to appointment has been having some issues with a chronic cough , constant in nature , possibly spasm . Has had swallow study done that did not show aspiration . He also complains of moderate acid reflux as well .    HPI    No problem-specific Assessment & Plan notes found for this encounter.       Problem List Items Addressed This Visit          Pulmonary    Chronic cough - Primary    Overview     possible relation to stroke . although has had negative swallow study for aspiration . He also states some reflux issues a well will try Protonix first . Will call office after week with update             ID    Oral thrush    Overview     Nystatin swish and swallow             GI    Gastroesophageal reflux disease without esophagitis        Past Medical History:  History reviewed. No pertinent past medical history.    Past Surgical History:  Past Surgical History:   Procedure Laterality Date    CAROTID ENDARTERECTOMY Left 8/17/2022    Procedure: ENDARTERECTOMY-CAROTID;  Surgeon: ROSANNA Servin III, MD;  Location: [address];  Service: Peripheral Vascular;  Laterality: Left;    EVACUATION OF HEMATOMA Left 8/25/2022    Procedure: EVACUATION, HEMATOMA;  Surgeon: ROCCO Torres II, MD;  Location: [address];  Service: Cardiovascular;  Laterality: Left;    EYE SURGERY      FOOT SURGERY      HAND SURGERY      RE-EXPLORATION FOR BLEEDING Left 8/25/2022    Procedure: RE-EXPLORATION, FOR BLEEDING,;  Surgeon: ROCCO Torres II, MD;  Location: [address];  Service: Cardiovascular;  Laterality: Left;       Family History:  family history includes COPD in  his father; Cancer in his sister; Stroke in his maternal uncle.     Social History:  Social History     Socioeconomic History    Marital status: Single   Tobacco Use    Smoking status: Former     Types: Pipe     Quit date: 2014     Years since quittin.1    Smokeless tobacco: Never   Substance and Sexual Activity    Alcohol use: Not Currently    Drug use: Never    Sexual activity: Not Currently     Birth control/protection: None       Review of Systems:   Review of Systems   Constitutional:  Negative for fever and weight loss.   HENT:  Negative for congestion, hearing loss and sore throat.    Eyes:  Negative for blurred vision.   Respiratory:  Positive for cough. Negative for shortness of breath.    Cardiovascular:  Negative for chest pain, palpitations, claudication and leg swelling.   Gastrointestinal:  Positive for heartburn. Negative for abdominal pain, constipation, diarrhea, nausea and vomiting.   Genitourinary:  Negative for dysuria.   Musculoskeletal:  Negative for back pain and myalgias.   Skin:  Negative for rash.   Neurological:  Negative for focal weakness and headaches.   Psychiatric/Behavioral:  Negative for depression and suicidal ideas. The patient is not nervous/anxious.       Medications:  Outpatient Encounter Medications as of 2/10/2023   Medication Sig Note Dispense Refill    aspirin (ASPIR-81 ORAL) Take by mouth every morning. 2022: Patient will hold until after surgery      DULoxetine (CYMBALTA) 30 MG capsule Take 1 capsule (30 mg total) by mouth once daily. (Patient taking differently: Take 30 mg by mouth daily as needed.) 2022: May take morning of surgery if needed    90 capsule 3    lisinopriL (PRINIVIL,ZESTRIL) 2.5 MG tablet Take 1 tablet (2.5 mg total) by mouth once daily. (Patient taking differently: Take 2.5 mg by mouth every morning.) 2022: Hold am of surgery 90 tablet 3    MELATONIN ORAL Take by mouth every evening. 2022: May take pm before surgery       "ondansetron (ZOFRAN) 4 MG tablet Take 1 tablet by mouth every 6 (six) hours as needed. 8/16/2022: Currently not taking      polyethylene glycol (GLYCOLAX) 17 gram/dose powder Take by mouth. 8/16/2022: Hold am of surgery      rosuvastatin (CRESTOR) 40 MG Tab Take 1 tablet (40 mg total) by mouth once daily. (Patient taking differently: Take 40 mg by mouth every evening.) 8/16/2022: May take pm before surgery 90 tablet 3    sertraline (ZOLOFT) 25 MG tablet Take 25 mg by mouth once daily. 8/16/2022: Currently not taking      traZODone (DESYREL) 100 MG tablet Take 0.5 tablets by mouth nightly as needed. 8/16/2022: May take pm before surgery if needed      acetaminophen (TYLENOL) 325 MG tablet Take 650 mg by mouth. 8/16/2022: May take morning of surgery if needed         lactulose (CHRONULAC) 10 gram/15 mL solution Take 20 g by mouth. 8/16/2022: Hold am of surgery      nystatin (MYCOSTATIN) 100,000 unit/mL suspension Take 6 mLs (600,000 Units total) by mouth 4 (four) times daily. for 10 days  240 mL 0    oxyCODONE (ROXICODONE) 5 MG immediate release tablet Take 1 tablet (5 mg total) by mouth every 4 (four) hours as needed for Pain. (Patient not taking: Reported on 2/10/2023)  15 tablet 0    pantoprazole (PROTONIX) 40 MG tablet Take 1 tablet (40 mg total) by mouth once daily.  30 tablet 0     No facility-administered encounter medications on file as of 2/10/2023.       Allergies:  Review of patient's allergies indicates:   Allergen Reactions    Mirtazapine Rash         Physical Exam      Vitals:    02/10/23 1059   BP: 110/80   Pulse: 72   Resp: 18   Temp: 98.6 °F (37 °C)        Vital Signs  Temp: 98.6 °F (37 °C)  Temp src: Oral  Pulse: 72  Resp: 18  SpO2: 97 %  BP: 110/80  BP Location: Right arm  Patient Position: Sitting  Pain Score: 0-No pain  Height and Weight  Height: 5' 10" (177.8 cm)  Weight: 86.2 kg (190 lb 0.6 oz)  BSA (Calculated - sq m): 2.06 sq meters  BMI (Calculated): 27.3  Weight in (lb) to have BMI = 25: " 173.9]     Body mass index is 27.27 kg/m².    Physical Exam  Constitutional:       Appearance: He is well-developed.   HENT:      Head: Normocephalic.   Eyes:      Pupils: Pupils are equal, round, and reactive to light.   Neck:      Thyroid: No thyromegaly.   Cardiovascular:      Rate and Rhythm: Normal rate and regular rhythm.      Heart sounds: No murmur heard.    No friction rub. No gallop.   Pulmonary:      Effort: Pulmonary effort is normal.      Breath sounds: Normal breath sounds.   Abdominal:      General: Bowel sounds are normal.      Palpations: Abdomen is soft.   Musculoskeletal:         General: Normal range of motion.      Cervical back: Normal range of motion.   Skin:     General: Skin is warm and dry.   Neurological:      Mental Status: He is alert and oriented to person, place, and time.      Sensory: No sensory deficit.      Motor: Weakness present.      Comments: Right sided weakness    Psychiatric:         Behavior: Behavior normal.        Laboratory:  CBC:  No results for input(s): WBC, RBC, HGB, HCT, PLT, MCV, MCH, MCHC in the last 2160 hours.  CMP:  No results for input(s): GLU, CALCIUM, ALBUMIN, PROT, NA, K, CO2, CL, BUN, ALKPHOS, ALT, AST, BILITOT in the last 2160 hours.    Invalid input(s): CREATININ  URINALYSIS:  No results for input(s): COLORU, CLARITYU, SPECGRAV, PHUR, PROTEINUA, GLUCOSEU, BILIRUBINCON, BLOODU, WBCU, RBCU, BACTERIA, MUCUS, NITRITE, LEUKOCYTESUR, UROBILINOGEN, HYALINECASTS in the last 2160 hours.   LIPIDS:  No results for input(s): TSH, HDL, CHOL, TRIG, LDLCALC, CHOLHDL, NONHDLCHOL, TOTALCHOLEST in the last 2160 hours.  TSH:  No results for input(s): TSH in the last 2160 hours.  A1C:  No results for input(s): HGBA1C in the last 2160 hours.    Radiology:        Assessment:     Adarsh Wheeler is a 57 y.o.male with:    Chronic cough  -     pantoprazole (PROTONIX) 40 MG tablet; Take 1 tablet (40 mg total) by mouth once daily.  Dispense: 30 tablet; Refill:  0    Gastroesophageal reflux disease without esophagitis  -     pantoprazole (PROTONIX) 40 MG tablet; Take 1 tablet (40 mg total) by mouth once daily.  Dispense: 30 tablet; Refill: 0    Oral thrush  -     nystatin (MYCOSTATIN) 100,000 unit/mL suspension; Take 6 mLs (600,000 Units total) by mouth 4 (four) times daily. for 10 days  Dispense: 240 mL; Refill: 0                Plan:     Problem List Items Addressed This Visit          Pulmonary    Chronic cough - Primary    Overview     possible relation to stroke . although has had negative swallow study for aspiration . He also states some reflux issues a well will try Protonix first . Will call office after week with update             ID    Oral thrush    Overview     Nystatin swish and swallow             GI    Gastroesophageal reflux disease without esophagitis       As above, continue current medications and maintain follow up with specialists.  Return to clinic in as scheduled       Frederick W Dantagnan [facility]

## 2023-03-15 ENCOUNTER — PATIENT MESSAGE (OUTPATIENT)
Dept: PRIMARY CARE CLINIC | Facility: CLINIC | Age: 58
End: 2023-03-15
Payer: OTHER GOVERNMENT

## 2023-03-15 DIAGNOSIS — K21.9 GASTROESOPHAGEAL REFLUX DISEASE WITHOUT ESOPHAGITIS: ICD-10-CM

## 2023-03-15 DIAGNOSIS — R05.3 CHRONIC COUGH: ICD-10-CM

## 2023-03-15 PROBLEM — R53.1 WEAKNESS: Status: ACTIVE | Noted: 2023-03-15

## 2023-03-15 RX ORDER — PANTOPRAZOLE SODIUM 40 MG/1
40 TABLET, DELAYED RELEASE ORAL DAILY
Qty: 90 TABLET | Refills: 3 | Status: SHIPPED | OUTPATIENT
Start: 2023-03-15 | End: 2024-03-14

## 2023-04-21 ENCOUNTER — PATIENT MESSAGE (OUTPATIENT)
Dept: ADMINISTRATIVE | Facility: HOSPITAL | Age: 58
End: 2023-04-21
Payer: OTHER GOVERNMENT

## 2023-05-04 ENCOUNTER — PATIENT MESSAGE (OUTPATIENT)
Dept: VASCULAR SURGERY | Facility: CLINIC | Age: 58
End: 2023-05-04
Payer: OTHER GOVERNMENT

## 2023-05-05 ENCOUNTER — PATIENT MESSAGE (OUTPATIENT)
Dept: PRIMARY CARE CLINIC | Facility: CLINIC | Age: 58
End: 2023-05-05
Payer: OTHER GOVERNMENT

## 2023-05-22 ENCOUNTER — OFFICE VISIT (OUTPATIENT)
Dept: OTOLARYNGOLOGY | Facility: CLINIC | Age: 58
End: 2023-05-22
Payer: OTHER GOVERNMENT

## 2023-05-22 VITALS — BODY MASS INDEX: 28.41 KG/M2 | WEIGHT: 198 LBS

## 2023-05-22 DIAGNOSIS — M79.2 NEUROPATHIC PAIN: ICD-10-CM

## 2023-05-22 DIAGNOSIS — Z86.73 HISTORY OF STROKE: Primary | ICD-10-CM

## 2023-05-22 PROCEDURE — 99204 OFFICE O/P NEW MOD 45 MIN: CPT | Mod: S$PBB,,, | Performed by: OTOLARYNGOLOGY

## 2023-05-22 PROCEDURE — 99999 PR PBB SHADOW E&M-EST. PATIENT-LVL III: ICD-10-PCS | Mod: PBBFAC,,, | Performed by: OTOLARYNGOLOGY

## 2023-05-22 PROCEDURE — 99999 PR PBB SHADOW E&M-EST. PATIENT-LVL III: CPT | Mod: PBBFAC,,, | Performed by: OTOLARYNGOLOGY

## 2023-05-22 PROCEDURE — 99204 PR OFFICE/OUTPT VISIT, NEW, LEVL IV, 45-59 MIN: ICD-10-PCS | Mod: S$PBB,,, | Performed by: OTOLARYNGOLOGY

## 2023-05-22 PROCEDURE — 99213 OFFICE O/P EST LOW 20 MIN: CPT | Mod: PBBFAC | Performed by: OTOLARYNGOLOGY

## 2023-05-22 NOTE — PROGRESS NOTES
Chief Complaint   Patient presents with    concerned about severe pain in neck and ear       HPI   57 y.o. male presents for evaluation of left neck pain along with hypesthesia of the left ear lobule.  He also reports right-sided neck pain.  He describes left neck pain as throbbing.  He also reports pain with the 1st bite of food.  He reports numbness of the left ear lobule consistent with the distribution of the left greater auricular nerve.  He is status post carotid endarterectomy last year.  He also reports posterior neck pain radiating to the shoulders.    Review of Systems   Constitutional: Negative for fatigue and unexpected weight change.   HENT: Per HPI.  Eyes: Negative for visual disturbance.   Respiratory: Negative for shortness of breath, hemoptysis   Cardiovascular: Negative for chest pain and palpitations.   Musculoskeletal: Negative for decreased ROM, back pain.   Skin: Negative for rash, sunburn, itching.   Neurological: Negative for dizziness and seizures.   Hematological: Negative for adenopathy. Does not bruise/bleed easily.   Endocrine: Negative for rapid weight loss/weight gain, heat/cold intolerance.     Past Medical History   Patient Active Problem List   Diagnosis    Right sided weakness    Mixed hyperlipidemia    Hypertensive heart disease without heart failure    History of stroke    Broca's aphasia    Apraxia of speech    Bilateral carotid artery stenosis    Hematoma of neck    PTSD (post-traumatic stress disorder)    S/P carotid endarterectomy    Muscle weakness    Lack of coordination    Weakness of right hand    Gait abnormality    Chronic cough    Gastroesophageal reflux disease without esophagitis    Oral thrush    Weakness    Neuropathic pain           Past Surgical History   Past Surgical History:   Procedure Laterality Date    CAROTID ENDARTERECTOMY Left 8/17/2022    Procedure: ENDARTERECTOMY-CAROTID;  Surgeon: ROSANNA Servin III, MD;  Location: [facility] OR [address] [place] [address];  Service:  Peripheral Vascular;  Laterality: Left;    EVACUATION OF HEMATOMA Left 2022    Procedure: EVACUATION, HEMATOMA;  Surgeon: ROCCO Torres II, MD;  Location: [facility] OR [address] [place] [address];  Service: Cardiovascular;  Laterality: Left;    EYE SURGERY      FOOT SURGERY      HAND SURGERY      RE-EXPLORATION FOR BLEEDING Left 2022    Procedure: RE-EXPLORATION, FOR BLEEDING,;  Surgeon: ROCCO Torres II, MD;  [facility];  Service: Cardiovascular;  Laterality: Left;         Family History   Family History   Problem Relation Age of Onset    COPD Father             Cancer Sister         Bilateral Masectomy    Stroke Maternal Uncle         deceases           Social History   .  Social History     Socioeconomic History    Marital status: Single   Tobacco Use    Smoking status: Former     Types: Pipe     Quit date: 2014     Years since quittin.3    Smokeless tobacco: Never   Substance and Sexual Activity    Alcohol use: Not Currently    Drug use: Never    Sexual activity: Not Currently     Birth control/protection: None         Allergies   Review of patient's allergies indicates:   Allergen Reactions    Mirtazapine Rash           Physical Exam     There were no vitals filed for this visit.      Body mass index is 28.41 kg/m².      General: AOx3, NAD   Respiratory:  Symmetric chest rise, normal effort  Right Ear: External Auditory Canal WNL,TM w/o masses/lesions/perforations.  Middle ear without evidence of effusion.   Left Ear: External Auditory Canal WNL,TM w/o masses/lesions/perforations.  Middle ear without evidence of effusion.   Nose: No gross nasal septal deviation. Inferior Turbinates WNL bilaterally. No septal perforation. No masses/lesions.   Oral Cavity:  Oral Tongue mobile, no lesions noted. Hard Palate WNL. No buccal or FOM lesions.  Oropharynx:  No masses/lesions of the posterior pharyngeal wall. Tonsillar fossa without lesions. Soft palate without masses. Midline uvula.   Neck:   Well-healed left carotid endarterectomy scar.  No cervical lymphadenopathy, thyromegaly or thyroid nodules.  Normal range of motion.    Face: House Brackmann I bilaterally.     Outside records reviewed    Assessment/Plan  Problem List Items Addressed This Visit          Neuro    History of stroke - Primary    Neuropathic pain     Lateral neck pain, left greater than right status post left carotid endarterectomy.  His presentation is likely explained by several separate etiologies.  A suspected he does have neuropathic pain and 1st bite syndrome on the left.  He also has expected sensory changes from manipulation of the left greater auricular nerve during surgery.  Reassured him regarding the benign nature of these findings.  I am uncertain of the etiology of his right neck pain.  I suspect this posterior neck pain is due to underlying cervical spine disease.  Will arrange for neurology referral given his history of stroke and the complicated neuropathic nature of his problem.

## 2023-05-22 NOTE — ASSESSMENT & PLAN NOTE
Lateral neck pain, left greater than right status post left carotid endarterectomy.  His presentation is likely explained by several separate etiologies.  A suspected he does have neuropathic pain and 1st bite syndrome on the left.  He also has expected sensory changes from manipulation of the left greater auricular nerve during surgery.  Reassured him regarding the benign nature of these findings.  I am uncertain of the etiology of his right neck pain.  I suspect this posterior neck pain is due to underlying cervical spine disease.  Will arrange for neurology referral given his history of stroke and the complicated neuropathic nature of his problem.

## 2023-05-23 ENCOUNTER — PATIENT MESSAGE (OUTPATIENT)
Dept: PRIMARY CARE CLINIC | Facility: CLINIC | Age: 58
End: 2023-05-23
Payer: OTHER GOVERNMENT

## 2023-05-23 ENCOUNTER — TELEPHONE (OUTPATIENT)
Dept: NEUROLOGY | Facility: CLINIC | Age: 58
End: 2023-05-23
Payer: OTHER GOVERNMENT

## 2023-05-23 NOTE — TELEPHONE ENCOUNTER
----- Message from Jane Sales sent at 5/23/2023  2:27 PM CDT -----  Regarding: Appt  Contact: Pt @ [phone]  Pt has Neuropathic pain [M79.2], pt's sister is calling to get appt asap. Pt had a stroke and in need of seeing provider asap. Asking for a call back

## 2023-05-23 NOTE — TELEPHONE ENCOUNTER
----- Message from Arleen Barbosa sent at 5/23/2023  8:37 AM CDT -----  Regarding: appt access  Contact: [phone]  Adarsh Wheeler sister/Elena calling regarding Appointment Access  (message) for #Neuropathic pain. He is a stroke pt. Referral in system Please call

## 2023-05-25 NOTE — TELEPHONE ENCOUNTER
Called pt and spoke w pt's sister. Sister explained that she was looking to follow up on deficits being experienced from stroke.Sister had contacted Dr. Chou's office, but had seen Dr. Mendiola for a stroke f/u in July of 2022. I mentioned this to pt, and offered scheduling it with her instead. Sister stated that she doesn't totally remember her, but she has seen a lot of doctors so it is possible she did and she just does not remember. I recommended scheduling with Dr. Mendiola since she is more familiar with the pt, and requested and f/u anyway in her note. Sister was agreeable. Pt already had an appt scheduled so appt was left as is.

## 2023-05-29 ENCOUNTER — PATIENT MESSAGE (OUTPATIENT)
Dept: OTOLARYNGOLOGY | Facility: CLINIC | Age: 58
End: 2023-05-29
Payer: OTHER GOVERNMENT

## 2023-06-02 ENCOUNTER — TELEPHONE (OUTPATIENT)
Dept: VASCULAR SURGERY | Facility: CLINIC | Age: 58
End: 2023-06-02
Payer: OTHER GOVERNMENT

## 2023-06-02 DIAGNOSIS — I65.23 BILATERAL CAROTID ARTERY STENOSIS: Primary | ICD-10-CM

## 2023-06-02 NOTE — TELEPHONE ENCOUNTER
Contacted pt's sister Elena to schedule CCFD prior to visit with Dr. Servin. Appt scheduled, sister confirmed.

## 2023-06-08 ENCOUNTER — OFFICE VISIT (OUTPATIENT)
Dept: NEUROLOGY | Facility: CLINIC | Age: 58
End: 2023-06-08
Payer: OTHER GOVERNMENT

## 2023-06-08 VITALS
BODY MASS INDEX: 29.24 KG/M2 | SYSTOLIC BLOOD PRESSURE: 122 MMHG | DIASTOLIC BLOOD PRESSURE: 76 MMHG | WEIGHT: 197.44 LBS | HEIGHT: 69 IN | HEART RATE: 61 BPM

## 2023-06-08 DIAGNOSIS — Z86.73 HISTORY OF STROKE: Primary | ICD-10-CM

## 2023-06-08 DIAGNOSIS — I63.032: ICD-10-CM

## 2023-06-08 DIAGNOSIS — I63.89 OTHER CEREBRAL INFARCTION: ICD-10-CM

## 2023-06-08 PROCEDURE — 99215 OFFICE O/P EST HI 40 MIN: CPT | Mod: PBBFAC | Performed by: STUDENT IN AN ORGANIZED HEALTH CARE EDUCATION/TRAINING PROGRAM

## 2023-06-08 PROCEDURE — 99215 OFFICE O/P EST HI 40 MIN: CPT | Mod: S$PBB,,, | Performed by: STUDENT IN AN ORGANIZED HEALTH CARE EDUCATION/TRAINING PROGRAM

## 2023-06-08 PROCEDURE — 99999 PR PBB SHADOW E&M-EST. PATIENT-LVL V: CPT | Mod: PBBFAC,,, | Performed by: STUDENT IN AN ORGANIZED HEALTH CARE EDUCATION/TRAINING PROGRAM

## 2023-06-08 PROCEDURE — 99999 PR PBB SHADOW E&M-EST. PATIENT-LVL V: ICD-10-PCS | Mod: PBBFAC,,, | Performed by: STUDENT IN AN ORGANIZED HEALTH CARE EDUCATION/TRAINING PROGRAM

## 2023-06-08 PROCEDURE — 99215 PR OFFICE/OUTPT VISIT, EST, LEVL V, 40-54 MIN: ICD-10-PCS | Mod: S$PBB,,, | Performed by: STUDENT IN AN ORGANIZED HEALTH CARE EDUCATION/TRAINING PROGRAM

## 2023-06-08 NOTE — PROGRESS NOTES
Vascular Neurology Clinic  Return Clinic Visit    Patient Name: Adarsh Wheeler  MRN: [id number]  Date: 6/8/23  Referring Provider: No ref. provider found    CC: Ischemic stroke    SUBJECTIVE:      History of Present Illness: Adarsh Wheeler is a 57 y.o. male with a past medical history significant for HTN, HLD, prior RUE injury (IED injury, ), prior tobacco use who presents to clinic for follow-up for a recent admission for stroke.     He was admitted to [facility] on 12/8/2021 after presenting with right-hemiparesis and aphasia. He was last known well 4 days when he developed these symptoms. He laid down on the floor for 4 days before being found on the ground by his family. NCCTH and MRI reportedly showed a subacute left MCA territory infarction. He was not a candidate for acute intervention given last known well 4 days prior to presentation. MRA showed a left internal carotid artery occlusion. TTE with EF 60-65% and no evidence of intracardiac shunt. Hospital course complicated by hyponatremia and neurogenic bladder/bowel.       [facility] which was [facility] for possible extension vs new subacute infarction around the area of his prior stroke and concern for high-grade proximal and distal stenosis in the left ICA, carotid dopplers show b/l 50-69% stenosis. These images are not available for review (findings could represent T2 shine-through as opposed to new infarction), but they provide the written reports for these studies (listed below). They deny new or worsening neurologic symptoms related to these imaging findings.     Today, he reports pain along the right side of his neck and trapezius, states it feels muscular. He was started on cymbalta with limited benefit for this pain. He completed a rehabilitation program after his admission for stroke, but will restart PT/OT next week.    Interval History:   He is s/p left CEA with Dr. Marks 08/18/2022  with re-exploration for bleeding over his left neck 08/25/2022.    Today, he reports pain in his neck radiating to his ear on the left. He also has pain in the right ear. He also feels like his legs are giving out which occurs once a week since his stroke. He denies numbness in his legs or saddle anesthesia. No bowel or bladder symptoms, back pain. He also reports that his right ear feels frozen, left face feels numb at times. Continues on aspirin and plavix and statin.       Etiology: Left MCA territory infarction with left carotid occlusion per imaging reports, etiology appears to be large artery atherosclerosis, though a cardioembolic source is not excluded  Intervention: None, presented outside of the therapeutic window for IV thrombolysis or EVT    Work-up:    Imaging:  - MRI brain without contrast (12/8/2021): Subacute left middle cerebral artery territory infarction in the setting of suspected occluded left internal carotid artery.  - MRI brain without contrast (05/31/2022): Constellation of findings are consistent with subacute ischemic infarct along the margins of focal area of chronic volume loss from chronic vascular insult projecting about the left basal ganglia, left corona radiata, posterior left frontal lobe with more focal areas of sequela of chronic vascular insult within the subjacent posterior left frontal lobe, left mid and posterior basal ganglia/corona radiata, superior aspect of the left temporal lobe with subadjacent gliosis and subjacent areas of chronic petechial hemorrhage.   --- No MR evidence of acute intracranial hemorrhage or midline shift.  --- Loss of flow void signal identified throughout the horizontal portion of the left internal carotid artery at the skull base extending to the left laceral segment and cavernous and supraclinoid left ICA, which may reflect high-grade stenosis, occlusion.    - MRA brain without contrast (12/8/2021): Occlusion of the left ICA extending from the  visualized distal extracranial ICA up to the level of the carotid terminus as discussed above. Diminished flow signal identified within the left middle cerebral artery vasculature.  - MRA brain without contrast (05/31/2022): High-grade stenosis with loss of normal flow signal throughout the horizontal portion of the skull base left internal carotid artery extending to involve the cavernous sinus and supraclinoid segment of the left ICA. No other significant hemodynamic stenosis, vascular malformation, aneurysmal dilatation throughout the anterior and posterior intracranial arterial circulation.   - MRA carotids (05/31/2022):  High-grade stenosis identified throughout the horizontal portion of the left internal carotid artery at the skull base extending to the left laceral segment and cavernous and supraclinoid left ICA.  Suggestion of moderate to severe high-grade stenosis of the proximal right extracranial ICA. Suggestion of moderate to high-grade stenosis about the proximal left extracranial ICA.  -- Carotid U/S (5/31/2022): Findings compatible with 50-69% stenosis of the bilateral proximal/mid ICAs as detailed above. Markedly decreased EDV within the distal left internal carotid artery noted.     Cardiac Evaluation:  - TTE (12/9/2021): EF 60-65%, no LV wall motion abnormalities, no left atrial enlargement, no evidence of intracardiac shunt    Review of Systems: The following systems were reviewed with pertinent positives and negatives documented in the HPI: constitutional, eyes, CV, respiratory, GI, , musculoskeletal, skin, neurological, psychiatric    Past Medical History:  HTN  HLD  Stroke    Medications:  Any other notable medications as documented in HPI.  Aspirin 81  Crestor 40  Cymbalta 30  Lisinopril 2.5  Plavix 75  Trazodone 50  Zoloft 25    Allergies:  Review of patient's allergies indicates:   Allergen Reactions    Mirtazapine Rash       Social History:  Social History     Socioeconomic History     Marital status: Single   Tobacco Use    Smoking status: Former     Types: Pipe     Quit date: 2014     Years since quittin.4    Smokeless tobacco: Never   Substance and Sexual Activity    Alcohol use: Not Currently    Drug use: Never    Sexual activity: Not Currently     Birth control/protection: None     Any other notable Social History as documented in HPI.    Family History:  Any other notable FMH as documented in HPI.  Mother - TIAs  mAunt - stroke  mUncle - stroke    OBJECTIVE:     Physical Exam:   Vitals:    23 1057   BP: 122/76   Pulse: 61     GEN: NAD, pleasant, cooperative  CV: RRR  PULM: No signs of resp distress, on room air  ABD: Soft, non-tender to palpation    NEURO:  Mental status: The patient is alert, attentive, and oriented to self, age, month, year  Speech: Dysfluent speech with delayed verbal response, mild dysarthria, repetition intact, mild impairment with naming/reading    Cranial nerves:  CN II: Visual fields are full to confrontation. Pupils are 4mm and reactive to light OU.  CN III, IV, VI: EOMI, no nystagmus, no ptosis  CN V: Facial sensation is intact in all 3 divisions bilaterally.  CN VII: Mild right facial weakness  CN VIII: Hearing is normal bilaterally  CN IX, X: Palate elevates symmetrically.   CN XI: Head turning and shoulder shrug are intact  CN XII: Tongue is midline with normal movements and no atrophy.    Motor: Muscle bulk and tone are normal. No pronator drift.   Right Left   Right  Left   Deltoid 4+/5 5/5  Hip Flexion 4+/5 5/5   Biceps 4+/5 5/5  Hip Extension 5/5 5/5   Triceps 4+/5 5/5  Knee Flexion 5/5 5/5   Wrist Ext 4/5 5/5  Knee Extension 5/5 5/5   Finger Abd 5/5 5/5  Ankle dorsiflex 5/5 5/5       Ankle plantar flex 5/5 5/5     Reflexes: Brisk reflexes right patellar, biceps without spread. Reflexes are 2+ and symmetric at the biceps, brachioradialis, patellar. Plantar responses are flexor.    Sensory: Light touch, temperature sense are intact in bilateral  upper and lower extremities.    Coordination: Rapid alternating movements and fine finger movements are intact. There is no dysmetria on finger-to-nose and heel-knee-shin. There are no abnormal or extraneous movements.    Gait/Stance: Posture is normal. Hemiparetic gait.      ASSESSMENT/PLAN:     Diagnosis/Etiology: Left MCA territory infarction, etiology large artery atherosclerosis s/p left CEA. He reports several neurologic symptoms, most of which appear unrelated to his stroke (e.g. ear pain, neck pain) and/or difficult to localize (e.g. BLE weakness). Plan to obtain repeat imaging to evaluate. Continue DAPT at this time.   Stroke Risk Factors: HTN, HLD, bilateral carotid artery stenosis, s/p left CEA  Effects of Stroke: Mild fluent aphasia, right hemiparesis    Recommendations:   - Additional studies: Follow-up MRI brain and MRA head/neck without contrast.   - Antiplatelet/Anticoagulation: Continue ASA 81mg and plavix 75mg daily at this time given multiple recurrent neurologic symptoms.   - Lipid Management: Target is LDL < 70mg/dL. Continue crestor 40.   - Diabetes: Target hemoglobin A1c <7%, measured 2X/year or quarterly if not meeting goals. No known history of DM.  - Hypertension: Target systolic BP <130mmHg. At goal today. Continue home BP medications.  - Smoking: H/o tobacco use. None currently.  - Therapy: Continue PT/OT/SLP.  - RTC in 6 months      Problem List Items Addressed This Visit          Neuro    History of stroke - Primary     Other Visit Diagnoses       Stroke due to thrombosis of left carotid artery        Other cerebral infarction        Relevant Orders    MRA Brain without contrast (Completed)    MRA Neck without contrast (Completed)    MRI Brain Without Contrast (Completed)              Melba Mendiola MD, PhD  [facility]  Department of Vascular Neurology

## 2023-06-15 ENCOUNTER — TELEPHONE (OUTPATIENT)
Dept: VASCULAR SURGERY | Facility: CLINIC | Age: 58
End: 2023-06-15
Payer: OTHER GOVERNMENT

## 2023-06-15 ENCOUNTER — PATIENT MESSAGE (OUTPATIENT)
Dept: VASCULAR SURGERY | Facility: CLINIC | Age: 58
End: 2023-06-15
Payer: OTHER GOVERNMENT

## 2023-06-15 NOTE — TELEPHONE ENCOUNTER
Left message for the pt , a referral in needed for his visit for insurance approval. [facility] that was faxed on 6/15 for approval. Sending My chart message to the pt

## 2023-06-16 ENCOUNTER — PATIENT MESSAGE (OUTPATIENT)
Dept: NEUROLOGY | Facility: CLINIC | Age: 58
End: 2023-06-16
Payer: OTHER GOVERNMENT

## 2023-07-02 ENCOUNTER — HOSPITAL ENCOUNTER (OUTPATIENT)
Dept: RADIOLOGY | Facility: HOSPITAL | Age: 58
Discharge: HOME | End: 2023-07-02
Attending: STUDENT IN AN ORGANIZED HEALTH CARE EDUCATION/TRAINING PROGRAM
Payer: OTHER GOVERNMENT

## 2023-07-02 DIAGNOSIS — I63.89 OTHER CEREBRAL INFARCTION: ICD-10-CM

## 2023-07-02 PROCEDURE — 70547 MRA NECK WITHOUT CONTRAST: ICD-10-PCS | Mod: 26,,, | Performed by: RADIOLOGY

## 2023-07-02 PROCEDURE — 70544 MRA BRAIN WITHOUT CONTRAST: ICD-10-PCS | Mod: 26,59,, | Performed by: RADIOLOGY

## 2023-07-02 PROCEDURE — 70547 MR ANGIOGRAPHY NECK W/O DYE: CPT | Mod: TC

## 2023-07-02 PROCEDURE — 70544 MR ANGIOGRAPHY HEAD W/O DYE: CPT | Mod: 26,59,, | Performed by: RADIOLOGY

## 2023-07-02 PROCEDURE — 70544 MR ANGIOGRAPHY HEAD W/O DYE: CPT | Mod: TC

## 2023-07-02 PROCEDURE — 70551 MRI BRAIN STEM W/O DYE: CPT | Mod: TC

## 2023-07-02 PROCEDURE — 70547 MR ANGIOGRAPHY NECK W/O DYE: CPT | Mod: 26,,, | Performed by: RADIOLOGY

## 2023-07-02 PROCEDURE — 70551 MRI BRAIN STEM W/O DYE: CPT | Mod: 26,,, | Performed by: RADIOLOGY

## 2023-07-02 PROCEDURE — 70551 MRI BRAIN WITHOUT CONTRAST: ICD-10-PCS | Mod: 26,,, | Performed by: RADIOLOGY

## 2023-07-03 ENCOUNTER — PATIENT MESSAGE (OUTPATIENT)
Dept: NEUROLOGY | Facility: CLINIC | Age: 58
End: 2023-07-03
Payer: OTHER GOVERNMENT

## 2023-07-05 ENCOUNTER — PATIENT MESSAGE (OUTPATIENT)
Dept: VASCULAR SURGERY | Facility: CLINIC | Age: 58
End: 2023-07-05
Payer: OTHER GOVERNMENT

## 2023-07-06 ENCOUNTER — PATIENT MESSAGE (OUTPATIENT)
Dept: NEUROLOGY | Facility: CLINIC | Age: 58
End: 2023-07-06
Payer: OTHER GOVERNMENT

## 2023-07-10 ENCOUNTER — TELEPHONE (OUTPATIENT)
Dept: NEUROLOGY | Facility: CLINIC | Age: 58
End: 2023-07-10
Payer: OTHER GOVERNMENT

## 2023-07-10 NOTE — TELEPHONE ENCOUNTER
Spoke with sister and rescheduled follow up appointment with Dr. Mendiola to 7/11 at 8:00 am to review Imaging results.       ----- Message from Bethany Mena sent at 7/10/2023 10:53 AM CDT -----  Regarding: call back  Contact: [phone] Elena Perez sister calling in requesting call back regarding message that was left in portal twice  to speak with Padilla regarding MRI please call to discuss

## 2023-07-10 NOTE — TELEPHONE ENCOUNTER
----- Message from Brandie Vilchis sent at 7/7/2023  9:00 AM CDT -----  Regarding: results  Contact: @ [phone]  Pt sister is calling to see if someone can call  in regards to recent lab/scans results epr the sister the results are saying abnormal and she would like to know the next plan of care  ..Please call and adv @ [phone]

## 2023-07-11 ENCOUNTER — OFFICE VISIT (OUTPATIENT)
Dept: NEUROLOGY | Facility: CLINIC | Age: 58
End: 2023-07-11
Payer: OTHER GOVERNMENT

## 2023-07-11 ENCOUNTER — OFFICE VISIT (OUTPATIENT)
Dept: VASCULAR SURGERY | Facility: CLINIC | Age: 58
End: 2023-07-11
Payer: OTHER GOVERNMENT

## 2023-07-11 ENCOUNTER — HOSPITAL ENCOUNTER (OUTPATIENT)
Dept: VASCULAR SURGERY | Facility: CLINIC | Age: 58
Discharge: HOME | End: 2023-07-11
Attending: SURGERY
Payer: OTHER GOVERNMENT

## 2023-07-11 VITALS
SYSTOLIC BLOOD PRESSURE: 132 MMHG | BODY MASS INDEX: 29.12 KG/M2 | DIASTOLIC BLOOD PRESSURE: 77 MMHG | HEART RATE: 61 BPM | WEIGHT: 197.19 LBS

## 2023-07-11 VITALS
HEIGHT: 71 IN | BODY MASS INDEX: 27.47 KG/M2 | SYSTOLIC BLOOD PRESSURE: 132 MMHG | HEART RATE: 60 BPM | DIASTOLIC BLOOD PRESSURE: 74 MMHG | TEMPERATURE: 98 F | WEIGHT: 196.19 LBS

## 2023-07-11 DIAGNOSIS — Z86.73 HISTORY OF STROKE: Primary | ICD-10-CM

## 2023-07-11 DIAGNOSIS — I65.23 BILATERAL CAROTID ARTERY STENOSIS: Primary | ICD-10-CM

## 2023-07-11 DIAGNOSIS — I65.23 BILATERAL CAROTID ARTERY STENOSIS: ICD-10-CM

## 2023-07-11 DIAGNOSIS — H92.02 OTALGIA OF LEFT EAR: ICD-10-CM

## 2023-07-11 DIAGNOSIS — I63.032: ICD-10-CM

## 2023-07-11 DIAGNOSIS — R13.10 DYSPHAGIA, UNSPECIFIED TYPE: ICD-10-CM

## 2023-07-11 PROCEDURE — 99999 PR PBB SHADOW E&M-EST. PATIENT-LVL II: ICD-10-PCS | Mod: PBBFAC,,, | Performed by: STUDENT IN AN ORGANIZED HEALTH CARE EDUCATION/TRAINING PROGRAM

## 2023-07-11 PROCEDURE — 99999 PR PBB SHADOW E&M-EST. PATIENT-LVL IV: CPT | Mod: PBBFAC,,, | Performed by: SURGERY

## 2023-07-11 PROCEDURE — 99215 OFFICE O/P EST HI 40 MIN: CPT | Mod: S$PBB,,, | Performed by: STUDENT IN AN ORGANIZED HEALTH CARE EDUCATION/TRAINING PROGRAM

## 2023-07-11 PROCEDURE — 93880 EXTRACRANIAL BILAT STUDY: CPT | Mod: PBBFAC | Performed by: SURGERY

## 2023-07-11 PROCEDURE — 93880 PR DUPLEX SCAN EXTRACRANIAL,BILAT: ICD-10-PCS | Mod: 26,S$PBB,, | Performed by: SURGERY

## 2023-07-11 PROCEDURE — 99999 PR PBB SHADOW E&M-EST. PATIENT-LVL IV: ICD-10-PCS | Mod: PBBFAC,,, | Performed by: SURGERY

## 2023-07-11 PROCEDURE — 93880 EXTRACRANIAL BILAT STUDY: CPT | Mod: 26,S$PBB,, | Performed by: SURGERY

## 2023-07-11 PROCEDURE — 99999 PR PBB SHADOW E&M-EST. PATIENT-LVL II: CPT | Mod: PBBFAC,,, | Performed by: STUDENT IN AN ORGANIZED HEALTH CARE EDUCATION/TRAINING PROGRAM

## 2023-07-11 PROCEDURE — 99212 OFFICE O/P EST SF 10 MIN: CPT | Mod: PBBFAC,25,27 | Performed by: STUDENT IN AN ORGANIZED HEALTH CARE EDUCATION/TRAINING PROGRAM

## 2023-07-11 PROCEDURE — 99214 OFFICE O/P EST MOD 30 MIN: CPT | Mod: S$PBB,,, | Performed by: SURGERY

## 2023-07-11 PROCEDURE — 99214 PR OFFICE/OUTPT VISIT, EST, LEVL IV, 30-39 MIN: ICD-10-PCS | Mod: S$PBB,,, | Performed by: SURGERY

## 2023-07-11 PROCEDURE — 99215 PR OFFICE/OUTPT VISIT, EST, LEVL V, 40-54 MIN: ICD-10-PCS | Mod: S$PBB,,, | Performed by: STUDENT IN AN ORGANIZED HEALTH CARE EDUCATION/TRAINING PROGRAM

## 2023-07-11 PROCEDURE — 99214 OFFICE O/P EST MOD 30 MIN: CPT | Mod: PBBFAC | Performed by: SURGERY

## 2023-07-11 RX ORDER — CLOPIDOGREL BISULFATE 75 MG/1
75 TABLET ORAL
COMMUNITY
Start: 2023-02-07

## 2023-07-11 RX ORDER — FERROUS SULFATE 325(65) MG
325 TABLET ORAL
COMMUNITY
Start: 2023-06-05

## 2023-07-11 RX ORDER — GABAPENTIN 300 MG/1
300 CAPSULE ORAL 3 TIMES DAILY
Qty: 90 CAPSULE | Refills: 11 | Status: SHIPPED | OUTPATIENT
Start: 2023-07-11 | End: 2024-07-10

## 2023-07-11 RX ORDER — ASCORBIC ACID 500 MG
500 TABLET ORAL
COMMUNITY
Start: 2023-06-05

## 2023-07-11 NOTE — PROGRESS NOTES
Vascular Neurology Clinic  Return Clinic Visit    Patient Name: Adarsh Wheeler  MRN: [id number]  Date: 7/11/23  Referring Provider: No ref. provider found    CC: Ischemic stroke    SUBJECTIVE:      History of Present Illness: Adarsh Wheeler is a 57 y.o. male with a past medical history significant for HTN, HLD, prior RUE injury (IED injury, ), prior tobacco use who presents to clinic for follow-up for a recent admission for stroke.     He was admitted to [facility] [place] on 12/8/2021 after presenting with right-hemiparesis and aphasia. He was last known well 4 days when he developed these symptoms. He laid down on the floor for 4 days before being found by his family. NCCTH and MRI reportedly showed a subacute left MCA territory infarction. He was not a candidate for acute intervention given last known well 4 days prior to presentation. MRA neck showed a left internal carotid artery occlusion. TTE with EF 60-65% and no evidence of intracardiac shunt. Hospital course complicated by hyponatremia and neurogenic bladder/bowel.       [facility] which was concerning for possible extension vs new subacute infarction around the area of his prior stroke and concern for high-grade proximal and distal stenosis in the left ICA, carotid dopplers show b/l 50-69% stenosis. These images are not available for review (findings could represent T2 shine-through as opposed to new infarction), but they provide the written reports for these studies (listed below). They deny new or worsening neurologic symptoms related to these imaging findings.     Today, he reports pain along the right side of his neck and trapezius, states it feels muscular. He was started on cymbalta with limited benefit for this pain. He completed a rehabilitation program after his admission for stroke, but will restart PT/OT next week.    Interval History:   He is s/p left CEA with Dr. Marks 08/18/2022 with  re-exploration for bleeding over his left neck 08/25/2022.    Today, he reports pain in his neck radiating to his ear on the left. He also has pain in the right ear. He also feels like his legs are giving out which occurs once a week since his stroke. He denies numbness in his legs or saddle anesthesia. No bowel or bladder symptoms, back pain. He also reports that his right ear feels frozen, left face feels numb at times. Continues on aspirin and plavix and statin.     Repeat MRI brain negative for new infarction. MRA head/neck shows patent left ICA and >70% right carotid stenosis. No new or worsening symptoms since he was last see in clinic.       Etiology: Left MCA territory infarction with left carotid occlusion per imaging reports, etiology appears to be large artery atherosclerosis, though a cardioembolic source is not excluded  Intervention: None, presented outside of the therapeutic window for IV thrombolysis or EVT    Work-up:    Imaging:  - MRI brain without contrast (12/8/2021): Subacute left middle cerebral artery territory infarction in the setting of suspected occluded left internal carotid artery.  - MRI brain without contrast (05/31/2022): Constellation of findings are consistent with subacute ischemic infarct along the margins of focal area of chronic volume loss from chronic vascular insult projecting about the left basal ganglia, left corona radiata, posterior left frontal lobe with more focal areas of sequela of chronic vascular insult within the subjacent posterior left frontal lobe, left mid and posterior basal ganglia/corona radiata, superior aspect of the left temporal lobe with subadjacent gliosis and subjacent areas of chronic petechial hemorrhage.   --- No MR evidence of acute intracranial hemorrhage or midline shift.  --- Loss of flow void signal identified throughout the horizontal portion of the left internal carotid artery at the skull base extending to the left laceral segment and  cavernous and supraclinoid left ICA, which may reflect high-grade stenosis, occlusion.    - MRA brain without contrast (12/8/2021): Occlusion of the left ICA extending from the visualized distal extracranial ICA up to the level of the carotid terminus as discussed above. Diminished flow signal identified within the left middle cerebral artery vasculature.  - MRA brain without contrast (05/31/2022): High-grade stenosis with loss of normal flow signal throughout the horizontal portion of the skull base left internal carotid artery extending to involve the cavernous sinus and supraclinoid segment of the left ICA. No other significant hemodynamic stenosis, vascular malformation, aneurysmal dilatation throughout the anterior and posterior intracranial arterial circulation.   - MRA carotids (05/31/2022):  High-grade stenosis identified throughout the horizontal portion of the left internal carotid artery at the skull base extending to the left laceral segment and cavernous and supraclinoid left ICA.  Suggestion of moderate to severe high-grade stenosis of the proximal right extracranial ICA. Suggestion of moderate to high-grade stenosis about the proximal left extracranial ICA.  -- Carotid U/S (5/31/2022): Findings compatible with 50-69% stenosis of the bilateral proximal/mid ICAs as detailed above. Markedly decreased EDV within the distal left internal carotid artery noted.     Cardiac Evaluation:  - TTE (12/9/2021): EF 60-65%, no LV wall motion abnormalities, no left atrial enlargement, no evidence of intracardiac shunt    Review of Systems: The following systems were reviewed with pertinent positives and negatives documented in the HPI: constitutional, eyes, CV, respiratory, GI, , musculoskeletal, skin, neurological, psychiatric    Past Medical History:  HTN  HLD  Stroke    Medications:  Any other notable medications as documented in HPI.  Aspirin 81  Crestor 40  Cymbalta 30  Lisinopril 2.5  Plavix 75  Trazodone  50  Zoloft 25    Allergies:  Review of patient's allergies indicates:   Allergen Reactions    Mirtazapine Rash       Social History:  Social History     Socioeconomic History    Marital status: Single   Tobacco Use    Smoking status: Former     Types: Pipe     Quit date: 2014     Years since quittin.5    Smokeless tobacco: Never   Substance and Sexual Activity    Alcohol use: Not Currently    Drug use: Never    Sexual activity: Not Currently     Birth control/protection: None     Any other notable Social History as documented in HPI.    Family History:  Any other notable FMH as documented in HPI.  Mother - TIAs  mAunt - stroke  mUncle - stroke    OBJECTIVE:     Physical Exam:   Vitals:    23 0820   BP: 132/77   Pulse: 61     GEN: NAD, pleasant, cooperative  CV: RRR  PULM: No signs of resp distress, on room air  ABD: Soft, non-tender to palpation    NEURO:  Mental status: The patient is alert, attentive, and oriented to self, age, month, year  Speech: Dysfluent speech with delayed verbal response, mild dysarthria, repetition intact, mild impairment with naming/reading    Cranial nerves:  CN II: Visual fields are full to confrontation. Pupils are 4mm and reactive to light OU.  CN III, IV, VI: EOMI, no nystagmus, no ptosis  CN V: Facial sensation is intact in all 3 divisions bilaterally.  CN VII: Mild right facial weakness  CN VIII: Hearing is normal bilaterally  CN IX, X: Palate elevates symmetrically.   CN XI: Head turning and shoulder shrug are intact  CN XII: Tongue is midline with normal movements and no atrophy.    Motor: Muscle bulk and tone are normal. No pronator drift.   Right Left   Right  Left   Deltoid 4+/5 5/5  Hip Flexion 4+/5 5/5   Biceps 4+/5 5/5  Hip Extension 5/5 5/5   Triceps 4+/5 5/5  Knee Flexion 5/5 5/5   Wrist Ext 4/5 5/5  Knee Extension 5/5 5/5   Finger Abd 5/5 5/5  Ankle dorsiflex 5/5 5/5       Ankle plantar flex 5/5 5/5     Reflexes: Brisk reflexes right patellar, biceps without  spread. Reflexes are 2+ and symmetric at the biceps, brachioradialis, patellar. Plantar responses are flexor.    Sensory: Light touch, temperature sense are intact in bilateral upper and lower extremities.    Coordination: Rapid alternating movements and fine finger movements are intact. There is no dysmetria on finger-to-nose and heel-knee-shin. There are no abnormal or extraneous movements.    Gait/Stance: Posture is normal. Hemiparetic gait.      ASSESSMENT/PLAN:     Diagnosis/Etiology: Left MCA territory infarction, etiology large artery atherosclerosis now s/p left CEA 08/17/2022  Stroke Risk Factors: HTN, HLD  Effects of Stroke: Mild fluent aphasia, right hemiparesis    Recommendations:   - Additional studies: Follow-up with vascular surgery for management of right carotid stenosis (below)   - Antiplatelet/Anticoagulation: From a stroke perspective, he may only require aspirin monotherapy given negative imaging obtained for multiple recent neurologic symptoms (BLE weakness/legs giving out, facial numbness, dizziness). His neck/ear pain is not neurovascular in etiology.   - Lipid Management: Target is LDL < 70mg/dL. Continue rosuvastatin 40.   - Diabetes: Target hemoglobin A1c <7%, measured 2X/year or quarterly if not meeting goals. No known history of DM.  - Hypertension: Target systolic BP <140mmHg, avoid hypotension given concern for carotid stenosis. At goal today. Continue home BP medications.  - Smoking: H/o tobacco use. None currently.  - Therapy: Continue PT/OT/SLP as needed. Recommend a home exercise program once discharged from PT.   - RTC in 6 months    # Right carotid stenosis  - Asymptomatic; pending repeat CUS. Antiplatelet regimen and consideration of intervention pending results of this study.   - Continue DAPT with aspirin and plavix for now.   - Continue high-intensity statin.  - Follow-up with vascular surgery with repeat CUS.     # Dysphagia  # Chronic cough  - He reports difficulty with  secretions and chronic cough. He is not undergoing speech therapy for dysphagia at this time. Plan for re-evaluation with SLP and MBSS to evaluate.     # Otalgia, left ear  - Cleared by ENT.   - Start gabapentin 300mg TID    Problem List Items Addressed This Visit          Neuro    History of stroke - Primary    Relevant Orders    Fl Modified Barium Swallow Speech    SLP video swallow     Other Visit Diagnoses       Stroke due to thrombosis of left carotid artery        Relevant Orders    Fl Modified Barium Swallow Speech    SLP video swallow    Dysphagia, unspecified type        Relevant Orders    Fl Modified Barium Swallow Speech    SLP video swallow    Otalgia of left ear        Relevant Medications    gabapentin (NEURONTIN) 300 MG capsule            50 minutes of total time spent on the encounter, which includes face to face time and non-face to face time preparing to see the patient (eg, review of tests), obtaining and/or reviewing separately obtained history, documenting clinical information in the electronic or other health record, independently interpreting results (not separately reported), and communicating results to the patient/family/caregiver, patient/family education, and care coordination (not separately reported).       Melba Mendiola MD, PhD  [facility]  Department of Vascular Neurology

## 2023-07-11 NOTE — PROGRESS NOTES
VASCULAR SURGERY SERVICE    CHIEF COMPLAINT:  Major left hemispheric stroke    HISTORY OF PRESENT ILLNESS: Adarsh Wheeler is a 57 y.[facility]. male [facility] who suffered a major left hemispheric stroke in December 2021. Per the sister sister SOFÍA he was found down after no contact for 4 days.  He initially was aphasic with severe motor deficits of his right arm and right leg.  He ultimately spent 8 months in rehab center was discharge proximally 2 months ago.  He is currently living independently.  He is also having some right neck pain    He denies any new symptoms of stroke TIA or amaurosis since this initial event.  However he [facility] proxim[facility] 6-8 weeks ago that had suggested a new potential subacute or extension of his prior left hemispheric stroke.    [facility] imaging also suggested a carotid siphon extension of his carotid stenosis on the left.    Is currently being managed with dual antiplatelet therapy and statin.  He is a nonsmoker.    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  He carries a diagnosis of posttraumatic stress syndrome    8/9/2022:  He now returns after CTA imaging.  No change in his prior history as detailed above.  Continues on dual antiplatelet therapy, and statin    S/p  Re-exploration for bleeding, left neck, (after significant physical workout)  08/25/2022  Left carotid endarterectomy 08/17/2022 9/13/2022:  This is his 1st postoperative visit.  He had unremarkable initial postoperative course after his carotid endarterectomy on 08/17/2022 and went home on postoperative day 1.   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   He was seen in the [facility] ED, had a CTA which showed no evidence of arterial bleeding but a large left neck hematoma.  Was transferred oxygen underwent urgent decompression of this large neck hematoma as above.  No significant bleeding point was  found.    Since discharge after the re-exploration, I was week after the original endarterectomy, he has done well.  Denies any interval stroke TIA or amaurosis      12/2/2022:  The patient returns at the urging of his sister for questions or regarding his ability to eat completely solid food, with occasional dysphagia.  In talking to the patient, he is had issues with swallowing solid food ever since his major left hemispheric stroke in December 2021.  Does not feel that this intermittent dysphagia, or inability to eat very solid foods, has changed since his carotid endarterectomy and re-exploration for bleeding in August 2022.   Feels that his expressive aphasia has stable from preop.  He denies any hoarseness    He is currently getting speech therapy, physical therapy and occupational therapy    07/11/2023:   This is an 8 month follow-up.  He is had no interval stroke TIA or amaurosis.  He is a three-month history of nearly continuous right facial and neck pain which he is seen Neurology today.  Per the sister report he has been advised to begin Neurontin for this..    His PCP ordered a recent MR a showing a proximally 70% right carotid stenosis.  Very mild vertebral disease was also noted.    He is currently taking aspirin and statin    History reviewed. No pertinent past medical history.    Past Surgical History:   Procedure Laterality Date    CAROTID ENDARTERECTOMY Left 8/17/2022    Procedure: ENDARTERECTOMY-CAROTID;  Surgeon: ROSANNA Servin III, MD;  Location: [address];  Service: Peripheral Vascular;  Laterality: Left;    EVACUATION OF HEMATOMA Left 8/25/2022    Procedure: EVACUATION, HEMATOMA;  Surgeon: ROCCO Torres II, MD;  Location: [address];  Service: Cardiovascular;  Laterality: Left;    EYE SURGERY      FOOT SURGERY      HAND SURGERY      RE-EXPLORATION FOR BLEEDING Left 8/25/2022    Procedure: RE-EXPLORATION, FOR BLEEDING,;  Surgeon: ROCCO Torres II, MD;  Location: [address]  [facility];  Service: Cardiovascular;  Laterality: Left;         Current Outpatient Medications:     acetaminophen (TYLENOL) 325 MG tablet, Take 650 mg by mouth., Disp: , Rfl:     ascorbic acid, vitamin C, (VITAMIN C) 500 MG tablet, 500 mg., Disp: , Rfl:     aspirin (ASPIR-81 ORAL), Take by mouth every morning., Disp: , Rfl:     clopidogreL (PLAVIX) 75 mg tablet, 75 mg., Disp: , Rfl:     ferrous sulfate (FEOSOL) 325 mg (65 mg iron) Tab tablet, 325 mg., Disp: , Rfl:     gabapentin (NEURONTIN) 300 MG capsule, Take 1 capsule (300 mg total) by mouth 3 (three) times daily., Disp: 90 capsule, Rfl: 11    lactulose (CHRONULAC) 10 gram/15 mL solution, Take 20 g by mouth., Disp: , Rfl:     lisinopriL (PRINIVIL,ZESTRIL) 2.5 MG tablet, Take 1 tablet (2.5 mg total) by mouth once daily. (Patient taking differently: Take 2.5 mg by mouth every morning.), Disp: 90 tablet, Rfl: 3    MELATONIN ORAL, Take by mouth every evening., Disp: , Rfl:     ondansetron (ZOFRAN) 4 MG tablet, Take 1 tablet by mouth every 6 (six) hours as needed., Disp: , Rfl:     pantoprazole (PROTONIX) 40 MG tablet, Take 1 tablet (40 mg total) by mouth once daily., Disp: 90 tablet, Rfl: 3    polyethylene glycol (GLYCOLAX) 17 gram/dose powder, Take by mouth., Disp: , Rfl:     rosuvastatin (CRESTOR) 40 MG Tab, Take 1 tablet (40 mg total) by mouth once daily. (Patient taking differently: Take 40 mg by mouth every evening.), Disp: 90 tablet, Rfl: 3    Review of patient's allergies indicates:   Allergen Reactions    Mirtazapine Rash       Family History   Problem Relation Age of Onset    COPD Father             Cancer Sister         Bilateral Masectomy    Stroke Maternal Uncle         deceases       Social History     Tobacco Use    Smoking status: Former     Types: Pipe     Quit date: 2014     Years since quittin.5    Smokeless tobacco: Never   Substance Use Topics    Alcohol use: Not Currently    Drug use: Never       PHYSICAL EXAM:   /74 (BP Location:  "Left arm, Patient Position: Sitting, BP Method: Large (Automatic))   Pulse 60   Temp 98.3 °F (36.8 °C) (Oral)   Ht 5' 11" (1.803 m)   Wt 89 kg (196 lb 3.4 oz)   BMI 27.37 kg/m²   Constitutional:  Alert,   Well-appearing  In no distress.   Neurological: Speech demonstrates ability to communicate, but does have intermittent word-finding difficulties consistent with expressive aphasia.  This is somewhat improved from when I last saw him 8 months ago    RIGHT arms 4+ over 5 strength.   He ambulates without assistance, although he has slight right foot drag.  unchanged from preoperative status.    no focal findings  CN II - XII grossly intact.    Psychiatric: Mood and affect appropriate and symmetric.   HEENT: Normocephalic / atraumatic  PERRLA  Midline trachea  Well-healed left cervical scar no swelling erythema or drainage   Cardiac: Regular rate and rhythm.   Pulmonary: Normal pulmonary effort.   Abdomen: Soft, not distended.     Skin: Warm and well perfused.    Vascular:  2+ radial pulses bilaterally   Extremities/  Musculoskeletal: No edema.        IMAGING:  Carotid duplex today reveals a right 60-79% carotid stenosis with a peak systolic velocity of 201 and diastolic of 63.  These velocities are actually decreased from 10 months ago as below  No residual left carotid stenosis    No new imaging today.  9/13/2022  Carotid duplex reveals a right 60-79% carotid stenosis with a peak velocity of 269 and diastolic 77 ratio of 3.1.  He has no significant residual left carotid stenosis    8/22 Recent carotid CTA was personally reviewed and demonstrates a 99% left cervical carotid stenosis with calcification.  The remainder of the upper cervical ICA  And siphon has diminished caliber throughout but is smooth.  There is a moderate right carotid stenosis      IMPRESSION:   1.   11 months status post left carotid endarterectomy for string sign, with remote major left hemispheric stroke.  2.   Right moderate carotid " stenosis, stable from prior, neurologically asymptomatic.  I reassured the patient and sister that his right facial and neck pain is not related to this atherosclerotic disease    I do not believe that his mild vertebral disease necessitates adding Plavix to his antiplatelet regimen    PLAN:    Continue aspirin and Plavix  Per Neurology, he is going to begin Neurontin for this right facial pain    F/u In 1 year with carotid duplex, sooner if clinically indicated    RALPH Servin III, MD, FACS  Professor and Chief, Vascular and Endovascular Surgery

## 2023-07-20 ENCOUNTER — PATIENT MESSAGE (OUTPATIENT)
Dept: ADMINISTRATIVE | Facility: HOSPITAL | Age: 58
End: 2023-07-20
Payer: OTHER GOVERNMENT

## 2023-07-20 ENCOUNTER — TELEPHONE (OUTPATIENT)
Dept: SPEECH THERAPY | Facility: HOSPITAL | Age: 58
End: 2023-07-20
Payer: OTHER GOVERNMENT

## 2023-07-20 NOTE — TELEPHONE ENCOUNTER
Left msg for callback to schedule mbss. Also left info for [facility] in [place] if wishing to have it done there.

## 2023-07-24 ENCOUNTER — PATIENT MESSAGE (OUTPATIENT)
Dept: PRIMARY CARE CLINIC | Facility: CLINIC | Age: 58
End: 2023-07-24
Payer: OTHER GOVERNMENT

## 2023-07-24 ENCOUNTER — TELEPHONE (OUTPATIENT)
Dept: SPEECH THERAPY | Facility: HOSPITAL | Age: 58
End: 2023-07-24
Payer: OTHER GOVERNMENT

## 2023-07-24 DIAGNOSIS — B37.0 THRUSH: Primary | ICD-10-CM

## 2023-07-24 NOTE — TELEPHONE ENCOUNTER
Eunice pt sister to schedule mbss 8/11@2pm as requested informed to fast 2hrs before appt [facility] radiology dept main clinic.

## 2023-07-25 ENCOUNTER — PATIENT MESSAGE (OUTPATIENT)
Dept: PRIMARY CARE CLINIC | Facility: CLINIC | Age: 58
End: 2023-07-25
Payer: OTHER GOVERNMENT

## 2023-07-25 RX ORDER — NYSTATIN 100000 [USP'U]/ML
4 SUSPENSION ORAL 4 TIMES DAILY
Qty: 160 ML | Refills: 0 | Status: SHIPPED | OUTPATIENT
Start: 2023-07-25 | End: 2023-08-04

## 2023-08-09 ENCOUNTER — TELEPHONE (OUTPATIENT)
Dept: SPEECH THERAPY | Facility: HOSPITAL | Age: 58
End: 2023-08-09
Payer: OTHER GOVERNMENT

## 2023-08-09 NOTE — TELEPHONE ENCOUNTER
Sw pt sister to inform pt test rescheduled for 8/31@2pm.----- Message from Stella Sales sent at 8/8/2023  3:55 PM CDT -----  Elena Huang (Other)  calling to reschedule appt scheduled on 08/11 for 08/31. Please call and adv @ [phone]

## 2023-08-23 ENCOUNTER — PATIENT MESSAGE (OUTPATIENT)
Dept: PRIMARY CARE CLINIC | Facility: CLINIC | Age: 58
End: 2023-08-23
Payer: OTHER GOVERNMENT

## 2023-08-23 DIAGNOSIS — B37.0 ORAL THRUSH: Primary | ICD-10-CM

## 2023-08-25 RX ORDER — NYSTATIN 100000 [USP'U]/ML
6 SUSPENSION ORAL 4 TIMES DAILY
Qty: 240 ML | Refills: 0 | Status: SHIPPED | OUTPATIENT
Start: 2023-08-25 | End: 2023-09-04

## 2023-08-30 ENCOUNTER — TELEPHONE (OUTPATIENT)
Dept: SPEECH THERAPY | Facility: HOSPITAL | Age: 58
End: 2023-08-30
Payer: OTHER GOVERNMENT

## 2023-08-30 ENCOUNTER — PATIENT MESSAGE (OUTPATIENT)
Dept: NEUROLOGY | Facility: CLINIC | Age: 58
End: 2023-08-30
Payer: OTHER GOVERNMENT

## 2023-08-30 NOTE — TELEPHONE ENCOUNTER
Left msg for pt sister informing pt mbss is not approved for tomorrow as [facility] has to [facility] to [facility]. There is a request for service form that ordering provider should fill out and fax directly to the [facility].

## 2023-08-30 NOTE — TELEPHONE ENCOUNTER
Pt sister called in and we discussed the mbss not being approved and needing to be sent to the [facility] for review.  Ms. Chen wanted to push back the mbss to 9/19@2pm. I informed there is a request for service form Dr. Mendiola the ordering provider would need to fill out and fax directly to the [facility]. [facility]

## 2023-08-30 NOTE — TELEPHONE ENCOUNTER
Eunice pt sister to inform he will go to the 2nd fl of the radiology dept.----- Message from Grecia Arguello sent at 8/30/2023  9:10 AM CDT -----  Regarding: appt clarification  Contact: pt's sister @Elena [phone]  Pt's sister is calling to see which floor and location should she bring pt for his appt on tomorrow for 2pm. Please c/b Thanks

## 2023-09-05 ENCOUNTER — TELEPHONE (OUTPATIENT)
Dept: NEUROLOGY | Facility: CLINIC | Age: 58
End: 2023-09-05
Payer: OTHER GOVERNMENT

## 2023-09-05 NOTE — TELEPHONE ENCOUNTER
----- Message from Tyrone Murray sent at 9/5/2023  1:19 PM CDT -----  Regarding: adv  Contact: @[phone] fax#[phone]  [facility] calling in regards to [facility] that was [facility] for pt  states authorization sent was for a different pt and needs the whole thing resent ...  pls call and adv@[phone] fax#[phone]

## 2023-09-19 ENCOUNTER — HOSPITAL ENCOUNTER (OUTPATIENT)
Dept: RADIOLOGY | Facility: HOSPITAL | Age: 58
Discharge: HOME | End: 2023-09-19
Attending: STUDENT IN AN ORGANIZED HEALTH CARE EDUCATION/TRAINING PROGRAM
Payer: OTHER GOVERNMENT

## 2023-09-19 ENCOUNTER — CLINICAL SUPPORT (OUTPATIENT)
Dept: SPEECH THERAPY | Facility: HOSPITAL | Age: 58
End: 2023-09-19
Attending: STUDENT IN AN ORGANIZED HEALTH CARE EDUCATION/TRAINING PROGRAM
Payer: OTHER GOVERNMENT

## 2023-09-19 ENCOUNTER — TELEPHONE (OUTPATIENT)
Dept: SPEECH THERAPY | Facility: HOSPITAL | Age: 58
End: 2023-09-19
Payer: OTHER GOVERNMENT

## 2023-09-19 DIAGNOSIS — R13.10 DYSPHAGIA, UNSPECIFIED TYPE: ICD-10-CM

## 2023-09-19 DIAGNOSIS — Z86.73 HISTORY OF STROKE: ICD-10-CM

## 2023-09-19 DIAGNOSIS — I63.032: ICD-10-CM

## 2023-09-19 PROCEDURE — 92611 MOTION FLUOROSCOPY/SWALLOW: CPT | Mod: GN | Performed by: SPEECH-LANGUAGE PATHOLOGIST

## 2023-09-19 PROCEDURE — 74230 FL MODIFIED BARIUM SWALLOW SPEECH STUDY: ICD-10-PCS | Mod: 26,,, | Performed by: RADIOLOGY

## 2023-09-19 PROCEDURE — A9698 NON-RAD CONTRAST MATERIALNOC: HCPCS | Performed by: STUDENT IN AN ORGANIZED HEALTH CARE EDUCATION/TRAINING PROGRAM

## 2023-09-19 PROCEDURE — 25500020 PHARM REV CODE 255: Performed by: STUDENT IN AN ORGANIZED HEALTH CARE EDUCATION/TRAINING PROGRAM

## 2023-09-19 PROCEDURE — 74230 X-RAY XM SWLNG FUNCJ C+: CPT | Mod: TC

## 2023-09-19 PROCEDURE — 74230 X-RAY XM SWLNG FUNCJ C+: CPT | Mod: 26,,, | Performed by: RADIOLOGY

## 2023-09-19 RX ADMIN — BARIUM SULFATE 90 ML: 0.81 POWDER, FOR SUSPENSION ORAL at 02:09

## 2023-09-19 NOTE — PROGRESS NOTES
Referring provider: Dr. Melba Mendiola  Reason for visit:  Modified barium swallow study (CPT 24634)    Report Summary   --Date: 09/19/2023  --Purpose: to evaluate anatomy and physiology of the oropharyngeal swallow, to determine effectiveness of rehabilitation strategies, and to determine diet consistency and intervention recommendations.   --Diet recommendations: regular as tolerated    Subjective / History    Adarsh Wheeler is a 58 y.o. male referred by Dr. Mendiola for Modified Barium Swallow Study (86393).  He is currently on a regular diet.  Patient reports a several month history of difficulty swallowing; he describes difficulty in his oral phase as well as some difficulty getting certain foods down.  Problem occurs with small pills, lasagna, roast beef, other solid foods.  He denies issues with soft foods, liquids.     Diet modification as a result of this problem: no  Weight loss: yes - about 50# since CVA  History of needing Heimlich maneuver: no    No past medical history on file.  Current Outpatient Medications on File Prior to Visit   Medication Sig Dispense Refill    acetaminophen (TYLENOL) 325 MG tablet Take 650 mg by mouth.      ascorbic acid, vitamin C, (VITAMIN C) 500 MG tablet 500 mg.      aspirin (ASPIR-81 ORAL) Take by mouth every morning.      clopidogreL (PLAVIX) 75 mg tablet 75 mg.      ferrous sulfate (FEOSOL) 325 mg (65 mg iron) Tab tablet 325 mg.      gabapentin (NEURONTIN) 300 MG capsule Take 1 capsule (300 mg total) by mouth 3 (three) times daily. 90 capsule 11    HYDROcodone-acetaminophen (NORCO) 5-325 mg per tablet Take 1 tablet by mouth every 6 (six) hours as needed for Pain. 15 tablet 0    lactulose (CHRONULAC) 10 gram/15 mL solution Take 20 g by mouth.      lisinopriL (PRINIVIL,ZESTRIL) 2.5 MG tablet Take 1 tablet (2.5 mg total) by mouth once daily. (Patient taking differently: Take 2.5 mg by mouth every morning.) 90 tablet 3    MELATONIN ORAL Take by mouth every evening.       ondansetron (ZOFRAN) 4 MG tablet Take 1 tablet by mouth every 6 (six) hours as needed.      pantoprazole (PROTONIX) 40 MG tablet Take 1 tablet (40 mg total) by mouth once daily. 90 tablet 3    polyethylene glycol (GLYCOLAX) 17 gram/dose powder Take by mouth.      rosuvastatin (CRESTOR) 40 MG Tab Take 1 tablet (40 mg total) by mouth once daily. (Patient taking differently: Take 40 mg by mouth every evening.) 90 tablet 3     No current facility-administered medications on file prior to visit.       Objective   Lateral videofluorographic views were obtained. The radiologist and speech pathologist were present for the entire procedure.     Consistencies assessed / method of administration  - Thin liquid/cup sip  - Thin liquid/sequential cup sips  - Pudding/tsp  - Cracker  - Barium tablet w/ water    Oral phase  - Grossly WNL with adequate/timely lip closure, tongue control during bolus hold, bolus preparation, and bolus transport/lingual motion.  Little to no oral residue.     Pharyngeal phase  - Timely initiation  - Adequate soft palate elevation  - Adequate laryngeal elevation and anterior hyoid excursion  - Adequate tongue base retraction  - Reduced epiglottic movement  - Adequate laryngeal vestibular closure  - Adequate pharyngeal stripping wave  - Minimal/trace pharyngeal residue   - Unobstructed but intermittently incomplete full PE segment opening    Esophageal phase (screen)  - Timely esophageal clearance     Strategies/therapeutic interventions attempted  -  n/a .    - Ongoing treatment recommendations and rationale (if treatment is recommended): oral phase focus per pt's complaints    Rosenbek Penetration-Aspiration Scale (Rosenbek et al 1996)  Best Trial: 1. Contrast does not enter airway.   Worst Trial: 1. Contrast does not enter airway.       Assessment / Impressions   The results of this MBSS indicate that patient demonstrates oropharyngeal swallowing function appropriate for a full PO diet w/o significant  restriction.  No aspiration observed. He reports some oral sensation issues.      Recommendations / POC   -Diet: recommend regular as tolerated   -Therapeutic technique: recommend alternate solid with liquid wash and multiple swallows per bolus   -Contact clinician or referring provider for repeat MBSS if swallowing status changes or worsens

## 2023-09-20 ENCOUNTER — PATIENT MESSAGE (OUTPATIENT)
Dept: SPEECH THERAPY | Facility: HOSPITAL | Age: 58
End: 2023-09-20
Payer: OTHER GOVERNMENT

## 2023-09-26 ENCOUNTER — PATIENT MESSAGE (OUTPATIENT)
Dept: NEUROLOGY | Facility: CLINIC | Age: 58
End: 2023-09-26
Payer: OTHER GOVERNMENT

## 2023-09-27 ENCOUNTER — TELEPHONE (OUTPATIENT)
Dept: SPEECH THERAPY | Facility: HOSPITAL | Age: 58
End: 2023-09-27

## 2023-09-27 NOTE — TELEPHONE ENCOUNTER
"----- Message from Stella Sales sent at 9/20/2023 10:02 AM CDT -----  Regarding: Pt advice  Contact: Pt's sister    Pt's sister requesting call back in regards to voicemail left.   Please call and adv @       Confirmed contact below:   Contact Name: Elena Huang (Other)   [phone] (Mobile)                 Additional Notes:  "Thank you for all that you do for our patients"                                           "

## 2023-11-06 ENCOUNTER — PATIENT MESSAGE (OUTPATIENT)
Dept: NEUROLOGY | Facility: CLINIC | Age: 58
End: 2023-11-06
Payer: OTHER GOVERNMENT

## 2023-11-07 ENCOUNTER — PATIENT MESSAGE (OUTPATIENT)
Dept: VASCULAR SURGERY | Facility: CLINIC | Age: 58
End: 2023-11-07
Payer: OTHER GOVERNMENT

## 2023-11-07 DIAGNOSIS — R06.00 DYSPNEA, UNSPECIFIED TYPE: Primary | ICD-10-CM

## 2023-12-04 ENCOUNTER — TELEPHONE (OUTPATIENT)
Dept: GASTROENTEROLOGY | Facility: CLINIC | Age: 58
End: 2023-12-04
Payer: OTHER GOVERNMENT

## 2023-12-04 NOTE — TELEPHONE ENCOUNTER
----- Message from Anamaria Delgado sent at 12/4/2023  3:24 PM CST -----  Good Afternoon,     [facility] would like to refer the following patient to Dr. Kuo in the Gastro  department. The patients diagnosis is Other dysphagia . I have scanned the patients referral and records into . Please schedule     Thank you,    Anamaria Wilson

## 2023-12-19 ENCOUNTER — TELEPHONE (OUTPATIENT)
Dept: ENDOSCOPY | Facility: HOSPITAL | Age: 58
End: 2023-12-19
Payer: OTHER GOVERNMENT

## 2024-03-06 ENCOUNTER — PATIENT MESSAGE (OUTPATIENT)
Dept: PULMONOLOGY | Facility: CLINIC | Age: 59
End: 2024-03-06

## 2024-03-06 ENCOUNTER — OFFICE VISIT (OUTPATIENT)
Dept: CARDIOLOGY | Facility: CLINIC | Age: 59
End: 2024-03-06
Payer: OTHER GOVERNMENT

## 2024-03-06 ENCOUNTER — OFFICE VISIT (OUTPATIENT)
Dept: PULMONOLOGY | Facility: CLINIC | Age: 59
End: 2024-03-06
Payer: OTHER GOVERNMENT

## 2024-03-06 VITALS
SYSTOLIC BLOOD PRESSURE: 148 MMHG | WEIGHT: 197.06 LBS | BODY MASS INDEX: 27.49 KG/M2 | DIASTOLIC BLOOD PRESSURE: 81 MMHG | OXYGEN SATURATION: 96 %

## 2024-03-06 VITALS
WEIGHT: 197.06 LBS | HEART RATE: 66 BPM | BODY MASS INDEX: 27.49 KG/M2 | SYSTOLIC BLOOD PRESSURE: 148 MMHG | DIASTOLIC BLOOD PRESSURE: 81 MMHG

## 2024-03-06 DIAGNOSIS — I20.89 ANGINAL EQUIVALENT: Primary | ICD-10-CM

## 2024-03-06 DIAGNOSIS — R06.00 DYSPNEA, UNSPECIFIED TYPE: ICD-10-CM

## 2024-03-06 DIAGNOSIS — E78.2 MIXED HYPERLIPIDEMIA: ICD-10-CM

## 2024-03-06 DIAGNOSIS — Z98.890 S/P CAROTID ENDARTERECTOMY: ICD-10-CM

## 2024-03-06 DIAGNOSIS — R06.09 DYSPNEA ON EXERTION: ICD-10-CM

## 2024-03-06 DIAGNOSIS — R06.02 SHORTNESS OF BREATH: Primary | ICD-10-CM

## 2024-03-06 DIAGNOSIS — I65.23 BILATERAL CAROTID ARTERY STENOSIS: ICD-10-CM

## 2024-03-06 DIAGNOSIS — I10 BENIGN ESSENTIAL HYPERTENSION: ICD-10-CM

## 2024-03-06 DIAGNOSIS — I11.9 HYPERTENSIVE HEART DISEASE WITHOUT HEART FAILURE: ICD-10-CM

## 2024-03-06 DIAGNOSIS — R13.10 DYSPHAGIA, UNSPECIFIED TYPE: ICD-10-CM

## 2024-03-06 DIAGNOSIS — R06.02 SHORTNESS OF BREATH: ICD-10-CM

## 2024-03-06 PROBLEM — I63.9 COMBINED RECEPTIVE AND EXPRESSIVE APHASIA DUE TO ACUTE CEREBROVASCULAR ACCIDENT (CVA): Status: ACTIVE | Noted: 2021-12-29

## 2024-03-06 PROBLEM — Z87.442 HISTORY OF RENAL CALCULI: Status: ACTIVE | Noted: 2024-03-06

## 2024-03-06 PROBLEM — R73.01 IMPAIRED FASTING GLUCOSE: Status: ACTIVE | Noted: 2024-03-06

## 2024-03-06 PROBLEM — E53.8 VITAMIN B12 DEFICIENCY: Status: ACTIVE | Noted: 2024-03-06

## 2024-03-06 PROBLEM — D50.9 IRON DEFICIENCY ANEMIA: Status: ACTIVE | Noted: 2024-03-06

## 2024-03-06 PROBLEM — E55.9 VITAMIN D DEFICIENCY: Status: ACTIVE | Noted: 2024-03-06

## 2024-03-06 PROBLEM — E53.8 LOW SERUM VITAMIN B12: Status: ACTIVE | Noted: 2024-03-06

## 2024-03-06 PROBLEM — S06.9X9A TRAUMATIC BRAIN INJURY WITH BRIEF (LESS THAN 1 HOUR) LOSS OF CONSCIOUSNESS: Status: ACTIVE | Noted: 2024-03-06

## 2024-03-06 PROBLEM — K64.9 HEMORRHOIDS: Status: ACTIVE | Noted: 2024-03-06

## 2024-03-06 PROBLEM — R47.01 COMBINED RECEPTIVE AND EXPRESSIVE APHASIA DUE TO ACUTE CEREBROVASCULAR ACCIDENT (CVA): Status: ACTIVE | Noted: 2021-12-29

## 2024-03-06 PROBLEM — G47.00 INSOMNIA: Status: ACTIVE | Noted: 2024-03-06

## 2024-03-06 PROBLEM — F17.200 NICOTINE DEPENDENCE: Status: ACTIVE | Noted: 2024-03-06

## 2024-03-06 PROCEDURE — 99213 OFFICE O/P EST LOW 20 MIN: CPT | Mod: PBBFAC | Performed by: INTERNAL MEDICINE

## 2024-03-06 PROCEDURE — 99999 PR PBB SHADOW E&M-EST. PATIENT-LVL III: CPT | Mod: PBBFAC,,, | Performed by: INTERNAL MEDICINE

## 2024-03-06 PROCEDURE — 99204 OFFICE O/P NEW MOD 45 MIN: CPT | Mod: S$PBB,,, | Performed by: INTERNAL MEDICINE

## 2024-03-06 PROCEDURE — 99213 OFFICE O/P EST LOW 20 MIN: CPT | Mod: PBBFAC,27,25 | Performed by: INTERNAL MEDICINE

## 2024-03-06 PROCEDURE — 93005 ELECTROCARDIOGRAM TRACING: CPT | Mod: PBBFAC | Performed by: INTERNAL MEDICINE

## 2024-03-06 PROCEDURE — 93010 ELECTROCARDIOGRAM REPORT: CPT | Mod: S$PBB,,, | Performed by: INTERNAL MEDICINE

## 2024-03-06 PROCEDURE — 99204 OFFICE O/P NEW MOD 45 MIN: CPT | Mod: S$PBB,25,, | Performed by: INTERNAL MEDICINE
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
## 2024-03-06 NOTE — ASSESSMENT & PLAN NOTE
Needs GI evaluation once cardiac testing complete. Chronic aspiration could be contributing to his symptoms. GI symptoms sound concerning for achalasia
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
## 2024-03-07 ENCOUNTER — PATIENT MESSAGE (OUTPATIENT)
Dept: CARDIOLOGY | Facility: CLINIC | Age: 59
End: 2024-03-07
Payer: OTHER GOVERNMENT

## 2024-03-07 LAB
OHS QRS DURATION: 96 MS
OHS QTC CALCULATION: 425 MS

## 2024-03-11 ENCOUNTER — PATIENT MESSAGE (OUTPATIENT)
Dept: CARDIOLOGY | Facility: CLINIC | Age: 59
End: 2024-03-11
Payer: OTHER GOVERNMENT

## 2024-03-21 ENCOUNTER — HOSPITAL ENCOUNTER (OUTPATIENT)
Dept: RADIOLOGY | Facility: HOSPITAL | Age: 59
Discharge: HOME | End: 2024-03-21
Attending: INTERNAL MEDICINE
Payer: OTHER GOVERNMENT

## 2024-03-21 ENCOUNTER — HOSPITAL ENCOUNTER (OUTPATIENT)
Dept: CARDIOLOGY | Facility: HOSPITAL | Age: 59
Discharge: HOME | End: 2024-03-21
Attending: INTERNAL MEDICINE
Payer: OTHER GOVERNMENT

## 2024-03-21 VITALS — HEIGHT: 71 IN | BODY MASS INDEX: 27.58 KG/M2 | WEIGHT: 197 LBS

## 2024-03-21 DIAGNOSIS — I20.89 ANGINAL EQUIVALENT: ICD-10-CM

## 2024-03-21 DIAGNOSIS — R06.00 DYSPNEA, UNSPECIFIED TYPE: ICD-10-CM

## 2024-03-21 DIAGNOSIS — R06.02 SHORTNESS OF BREATH: ICD-10-CM

## 2024-03-21 PROCEDURE — 93320 DOPPLER ECHO COMPLETE: CPT | Mod: 26,,, | Performed by: INTERNAL MEDICINE

## 2024-03-21 PROCEDURE — 93325 DOPPLER ECHO COLOR FLOW MAPG: CPT | Mod: 26,,, | Performed by: INTERNAL MEDICINE

## 2024-03-21 PROCEDURE — 93320 DOPPLER ECHO COMPLETE: CPT | Mod: PO

## 2024-03-21 PROCEDURE — 71046 X-RAY EXAM CHEST 2 VIEWS: CPT | Mod: TC,FY,PO

## 2024-03-21 PROCEDURE — 71046 X-RAY EXAM CHEST 2 VIEWS: CPT | Mod: 26,,, | Performed by: RADIOLOGY

## 2024-03-21 PROCEDURE — 93351 STRESS TTE COMPLETE: CPT | Mod: 26,,, | Performed by: INTERNAL MEDICINE

## 2024-03-22 ENCOUNTER — PATIENT MESSAGE (OUTPATIENT)
Dept: CARDIOLOGY | Facility: CLINIC | Age: 59
End: 2024-03-22
Payer: OTHER GOVERNMENT

## 2024-03-25 ENCOUNTER — TELEPHONE (OUTPATIENT)
Dept: CARDIOLOGY | Facility: CLINIC | Age: 59
End: 2024-03-25
Payer: OTHER GOVERNMENT

## 2024-03-25 LAB
ASCENDING AORTA: 3.28 CM
AV INDEX (PROSTH): 0.96
AV MEAN GRADIENT: 3 MMHG
AV PEAK GRADIENT: 4 MMHG
AV VALVE AREA BY VELOCITY RATIO: 3.11 CM²
AV VALVE AREA: 3.27 CM²
AV VELOCITY RATIO: 0.92
BSA FOR ECHO PROCEDURE: 2.12 M2
CV ECHO LV RWT: 0.44 CM
CV STRESS BASE HR: 70 BPM
DIASTOLIC BLOOD PRESSURE: 84 MMHG
DOP CALC AO PEAK VEL: 1.06 M/S
DOP CALC AO VTI: 23.4 CM
DOP CALC LVOT AREA: 3.4 CM2
DOP CALC LVOT DIAMETER: 2.08 CM
DOP CALC LVOT PEAK VEL: 0.97 M/S
DOP CALC LVOT STROKE VOLUME: 76.42 CM3
DOP CALCLVOT PEAK VEL VTI: 22.5 CM
E WAVE DECELERATION TIME: 182.93 MSEC
E/A RATIO: 0.92
E/E' RATIO: 8.25 M/S
ECHO LV POSTERIOR WALL: 0.97 CM (ref 0.6–1.1)
EJECTION FRACTION: 60 %
FRACTIONAL SHORTENING: 28 % (ref 28–44)
INTERVENTRICULAR SEPTUM: 1.05 CM (ref 0.6–1.1)
LEFT ATRIUM SIZE: 3.62 CM
LEFT ATRIUM VOLUME INDEX MOD: 19.7 ML/M2
LEFT ATRIUM VOLUME MOD: 41.28 CM3
LEFT INTERNAL DIMENSION IN SYSTOLE: 3.18 CM (ref 2.1–4)
LEFT VENTRICLE DIASTOLIC VOLUME INDEX: 42.6 ML/M2
LEFT VENTRICLE DIASTOLIC VOLUME: 89.47 ML
LEFT VENTRICLE MASS INDEX: 72 G/M2
LEFT VENTRICLE SYSTOLIC VOLUME INDEX: 19.1 ML/M2
LEFT VENTRICLE SYSTOLIC VOLUME: 40.2 ML
LEFT VENTRICULAR INTERNAL DIMENSION IN DIASTOLE: 4.44 CM (ref 3.5–6)
LEFT VENTRICULAR MASS: 152.07 G
LV LATERAL E/E' RATIO: 6.6 M/S
LV SEPTAL E/E' RATIO: 11 M/S
LVOT MG: 1.97 MMHG
LVOT MV: 0.65 CM/S
MV PEAK A VEL: 0.72 M/S
MV PEAK E VEL: 0.66 M/S
MV STENOSIS PRESSURE HALF TIME: 53.05 MS
MV VALVE AREA P 1/2 METHOD: 4.15 CM2
OHS CV CPX 1 MINUTE RECOVERY HEART RATE: 68 BPM
OHS CV CPX 85 PERCENT MAX PREDICTED HEART RATE MALE: 137
OHS CV CPX ESTIMATED METS: 7
OHS CV CPX MAX PREDICTED HEART RATE: 161
OHS CV CPX PATIENT IS FEMALE: 0
OHS CV CPX PATIENT IS MALE: 1
OHS CV CPX PEAK DIASTOLIC BLOOD PRESSURE: 103 MMHG
OHS CV CPX PEAK HEAR RATE: 115 BPM
OHS CV CPX PEAK RATE PRESSURE PRODUCT: NORMAL
OHS CV CPX PEAK SYSTOLIC BLOOD PRESSURE: 161 MMHG
OHS CV CPX PERCENT MAX PREDICTED HEART RATE ACHIEVED: 71
OHS CV CPX RATE PRESSURE PRODUCT PRESENTING: 9100
PISA TR MAX VEL: 2.39 M/S
PULM VEIN S/D RATIO: 1.03
PV PEAK D VEL: 0.39 M/S
PV PEAK S VEL: 0.4 M/S
RA PRESSURE ESTIMATED: 3 MMHG
RA VOL SYS: 24.33 ML
RIGHT ATRIAL AREA: 11.2 CM2
RIGHT VENTRICULAR END-DIASTOLIC DIMENSION: 3.77 CM
RIGHT VENTRICULAR LENGTH IN DIASTOLE (APICAL 4-CHAMBER VIEW): 7.57 CM
RV MID DIAMA: 2.93 CM
RV TB RVSP: 5 MMHG
RV TISSUE DOPPLER FREE WALL SYSTOLIC VELOCITY 1 (APICAL 4 CHAMBER VIEW): 12.71 CM/S
SINUS: 3.52 CM
STJ: 3.3 CM
STRESS ECHO POST EXERCISE DUR MIN: 4 MINUTES
STRESS ECHO POST EXERCISE DUR SEC: 0 SECONDS
SYSTOLIC BLOOD PRESSURE: 130 MMHG
TDI LATERAL: 0.1 M/S
TDI SEPTAL: 0.06 M/S
TDI: 0.08 M/S
TR MAX PG: 23 MMHG
TRICUSPID ANNULAR PLANE SYSTOLIC EXCURSION: 2.42 CM
TV REST PULMONARY ARTERY PRESSURE: 26 MMHG
Z-SCORE OF LEFT VENTRICULAR DIMENSION IN END DIASTOLE: -3.86
Z-SCORE OF LEFT VENTRICULAR DIMENSION IN END SYSTOLE: -1.79

## 2024-03-25 NOTE — TELEPHONE ENCOUNTER
----- Message from Glenn Merida Jr., MD sent at 3/25/2024 12:27 PM CDT -----  Office visit when I return from vacation to review tests.

## 2024-04-01 ENCOUNTER — TELEPHONE (OUTPATIENT)
Dept: CARDIOLOGY | Facility: CLINIC | Age: 59
End: 2024-04-01
Payer: OTHER GOVERNMENT

## 2024-04-01 NOTE — TELEPHONE ENCOUNTER
Spoke with patient sister she was requesting a follow up appt. per dr. Merida last note no follow up needed

## 2024-04-10 DIAGNOSIS — I63.89 OTHER CEREBRAL INFARCTION: Primary | ICD-10-CM

## 2024-04-19 PROBLEM — Z82.49 FAMILY HISTORY OF CARDIOVASCULAR DISEASE: Status: ACTIVE | Noted: 2024-04-19

## 2024-05-01 ENCOUNTER — TELEPHONE (OUTPATIENT)
Dept: NEUROLOGY | Facility: CLINIC | Age: 59
End: 2024-05-01
Payer: OTHER GOVERNMENT

## 2024-05-01 NOTE — TELEPHONE ENCOUNTER
----- Message from Grecia Radhasusanne Arguello sent at 4/24/2024  9:10 AM CDT -----  Regarding: Clinical Notes Needed  Contact: Moriah @ [phone]  Moriah a Nurse with the [facility]. States she needs clinical notes and request services form to complete the order for the MRI of the brain. Please fax to : [phone] Thanks

## 2024-05-03 NOTE — TELEPHONE ENCOUNTER
Faxed completed request services form and faxed to Moriah with [facility] at number provided.

## 2024-05-21 ENCOUNTER — PATIENT MESSAGE (OUTPATIENT)
Dept: VASCULAR SURGERY | Facility: CLINIC | Age: 59
End: 2024-05-21
Payer: OTHER GOVERNMENT

## 2024-05-21 DIAGNOSIS — I65.23 BILATERAL CAROTID ARTERY STENOSIS: Primary | ICD-10-CM

## 2024-05-21 NOTE — TELEPHONE ENCOUNTER
Contacted pt's sister Elena to schedule CCFD prior to appt with Dr. Servin. Appointment scheduled, Elena verified.

## 2024-05-24 ENCOUNTER — OFFICE VISIT (OUTPATIENT)
Dept: VASCULAR SURGERY | Facility: CLINIC | Age: 59
End: 2024-05-24
Payer: OTHER GOVERNMENT

## 2024-05-24 ENCOUNTER — HOSPITAL ENCOUNTER (OUTPATIENT)
Dept: VASCULAR SURGERY | Facility: CLINIC | Age: 59
Discharge: HOME | End: 2024-05-24
Attending: SURGERY
Payer: OTHER GOVERNMENT

## 2024-05-24 VITALS
HEIGHT: 71 IN | BODY MASS INDEX: 26.85 KG/M2 | HEART RATE: 60 BPM | TEMPERATURE: 98 F | DIASTOLIC BLOOD PRESSURE: 79 MMHG | WEIGHT: 191.81 LBS | SYSTOLIC BLOOD PRESSURE: 130 MMHG

## 2024-05-24 DIAGNOSIS — Z98.890 S/P CAROTID ENDARTERECTOMY: Primary | ICD-10-CM

## 2024-05-24 DIAGNOSIS — I65.23 BILATERAL CAROTID ARTERY STENOSIS: ICD-10-CM

## 2024-05-24 PROCEDURE — 99213 OFFICE O/P EST LOW 20 MIN: CPT | Mod: PBBFAC | Performed by: SURGERY

## 2024-05-24 PROCEDURE — 99999 PR PBB SHADOW E&M-EST. PATIENT-LVL III: CPT | Mod: PBBFAC,,, | Performed by: SURGERY

## 2024-05-24 PROCEDURE — 99214 OFFICE O/P EST MOD 30 MIN: CPT | Mod: S$PBB,,, | Performed by: SURGERY

## 2024-05-24 PROCEDURE — 93880 EXTRACRANIAL BILAT STUDY: CPT | Mod: 26,S$PBB,, | Performed by: SURGERY

## 2024-05-24 PROCEDURE — 93880 EXTRACRANIAL BILAT STUDY: CPT | Mod: PBBFAC | Performed by: SURGERY
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
## 2024-05-31 PROBLEM — R93.1 ELEVATED CORONARY ARTERY CALCIUM SCORE: Status: ACTIVE | Noted: 2024-05-31

## 2024-06-10 PROBLEM — R47.01 COMBINED RECEPTIVE AND EXPRESSIVE APHASIA DUE TO ACUTE CEREBROVASCULAR ACCIDENT (CVA): Status: RESOLVED | Noted: 2021-12-29 | Resolved: 2024-06-10

## 2024-06-10 PROBLEM — I63.9 COMBINED RECEPTIVE AND EXPRESSIVE APHASIA DUE TO ACUTE CEREBROVASCULAR ACCIDENT (CVA): Status: RESOLVED | Noted: 2021-12-29 | Resolved: 2024-06-10

## 2024-07-11 ENCOUNTER — OFFICE VISIT (OUTPATIENT)
Dept: VASCULAR SURGERY | Facility: CLINIC | Age: 59
End: 2024-07-11
Payer: OTHER GOVERNMENT

## 2024-07-11 VITALS
SYSTOLIC BLOOD PRESSURE: 125 MMHG | DIASTOLIC BLOOD PRESSURE: 87 MMHG | WEIGHT: 195.56 LBS | BODY MASS INDEX: 27.27 KG/M2

## 2024-07-11 DIAGNOSIS — I25.10 CORONARY ARTERY DISEASE, UNSPECIFIED VESSEL OR LESION TYPE, UNSPECIFIED WHETHER ANGINA PRESENT, UNSPECIFIED WHETHER NATIVE OR TRANSPLANTED HEART: Primary | ICD-10-CM

## 2024-07-11 DIAGNOSIS — I65.21 STENOSIS OF RIGHT CAROTID ARTERY: ICD-10-CM

## 2024-07-11 DIAGNOSIS — I63.9 CEREBROVASCULAR ACCIDENT (CVA), UNSPECIFIED MECHANISM: ICD-10-CM

## 2024-07-11 DIAGNOSIS — I25.10 CORONARY ARTERY DISEASE INVOLVING NATIVE CORONARY ARTERY OF NATIVE HEART WITHOUT ANGINA PECTORIS: Primary | ICD-10-CM

## 2024-07-11 PROCEDURE — 99999 PR PBB SHADOW E&M-EST. PATIENT-LVL III: CPT | Mod: PBBFAC,,, | Performed by: THORACIC SURGERY (CARDIOTHORACIC VASCULAR SURGERY)

## 2024-07-11 PROCEDURE — 99213 OFFICE O/P EST LOW 20 MIN: CPT | Mod: PBBFAC,PO | Performed by: THORACIC SURGERY (CARDIOTHORACIC VASCULAR SURGERY)

## 2024-07-11 RX ORDER — CEFAZOLIN SODIUM 2 G/50ML
2 SOLUTION INTRAVENOUS
OUTPATIENT
Start: 2024-07-11

## 2024-07-11 RX ORDER — METOPROLOL SUCCINATE 25 MG/1
12.5 TABLET, EXTENDED RELEASE ORAL DAILY
Qty: 15 TABLET | Refills: 0 | Status: SHIPPED | OUTPATIENT
Start: 2024-07-11 | End: 2025-07-11

## 2024-07-11 RX ORDER — MUPIROCIN 20 MG/G
OINTMENT TOPICAL
OUTPATIENT
Start: 2024-07-11

## 2024-07-11 RX ORDER — METOPROLOL SUCCINATE 25 MG/1
12.5 TABLET, EXTENDED RELEASE ORAL DAILY
Qty: 45 TABLET | Refills: 3 | Status: SHIPPED | OUTPATIENT
Start: 2024-07-11 | End: 2025-07-11

## 2024-07-11 RX ORDER — CHLORHEXIDINE GLUCONATE ORAL RINSE 1.2 MG/ML
10 SOLUTION DENTAL
OUTPATIENT
Start: 2024-07-11
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
## 2024-07-12 ENCOUNTER — PATIENT MESSAGE (OUTPATIENT)
Dept: VASCULAR SURGERY | Facility: CLINIC | Age: 59
End: 2024-07-12
Payer: OTHER GOVERNMENT

## 2024-07-15 ENCOUNTER — TELEPHONE (OUTPATIENT)
Dept: VASCULAR SURGERY | Facility: CLINIC | Age: 59
End: 2024-07-15
Payer: OTHER GOVERNMENT

## 2024-07-15 NOTE — TELEPHONE ENCOUNTER
Consents signed and received.   ----- Message from Edelmira Cody sent at 7/15/2024  1:12 PM CDT -----  Contact: self  Type: Sooner Appointment Request        Caller is requesting a sooner appointment. Caller declined first available appointment listed below. Caller will not accept being placed on the waitlist and is requesting a message be sent to doctor.        Name of Caller: Patient   Best Call Back Number: [phone]  Additional Information: Pt states he wants to know has the paperwork been received that he's sent to release hisself plz call the pt to verify this info. Thanks

## 2024-07-17 PROBLEM — Z95.1 HX OF CABG: Status: ACTIVE | Noted: 2024-07-17

## 2024-07-18 PROBLEM — E83.51 HYPOCALCEMIA: Status: ACTIVE | Noted: 2024-07-18

## 2024-07-18 PROBLEM — D72.829 LEUKOCYTOSIS: Status: ACTIVE | Noted: 2024-07-18

## 2024-07-18 PROBLEM — D62 POSTOPERATIVE ANEMIA DUE TO ACUTE BLOOD LOSS: Status: ACTIVE | Noted: 2024-07-18

## 2024-07-18 PROBLEM — D69.6 THROMBOCYTOPENIA: Status: ACTIVE | Noted: 2024-07-18

## 2024-07-18 PROBLEM — Z73.6 LIMITATION DUE TO DISABILITY: Status: ACTIVE | Noted: 2024-07-18

## 2024-07-23 ENCOUNTER — PATIENT MESSAGE (OUTPATIENT)
Dept: VASCULAR SURGERY | Facility: CLINIC | Age: 59
End: 2024-07-23
Payer: OTHER GOVERNMENT

## 2024-07-29 ENCOUNTER — PATIENT MESSAGE (OUTPATIENT)
Dept: VASCULAR SURGERY | Facility: CLINIC | Age: 59
End: 2024-07-29
Payer: OTHER GOVERNMENT

## 2024-08-15 ENCOUNTER — HOSPITAL ENCOUNTER (OUTPATIENT)
Dept: RADIOLOGY | Facility: HOSPITAL | Age: 59
Discharge: HOME | End: 2024-08-15
Attending: THORACIC SURGERY (CARDIOTHORACIC VASCULAR SURGERY)
Payer: OTHER GOVERNMENT

## 2024-08-15 ENCOUNTER — OFFICE VISIT (OUTPATIENT)
Dept: VASCULAR SURGERY | Facility: CLINIC | Age: 59
End: 2024-08-15
Payer: OTHER GOVERNMENT

## 2024-08-15 VITALS
DIASTOLIC BLOOD PRESSURE: 72 MMHG | SYSTOLIC BLOOD PRESSURE: 118 MMHG | WEIGHT: 188.94 LBS | HEIGHT: 71 IN | HEART RATE: 82 BPM | BODY MASS INDEX: 26.45 KG/M2

## 2024-08-15 DIAGNOSIS — Z95.1 HX OF CABG: Primary | ICD-10-CM

## 2024-08-15 DIAGNOSIS — Z95.1 HX OF CABG: ICD-10-CM

## 2024-08-15 DIAGNOSIS — Z98.890 H/O CAROTID ENDARTERECTOMY: ICD-10-CM

## 2024-08-15 PROCEDURE — 99024 POSTOP FOLLOW-UP VISIT: CPT | Mod: ,,, | Performed by: THORACIC SURGERY (CARDIOTHORACIC VASCULAR SURGERY)

## 2024-08-15 PROCEDURE — 71046 X-RAY EXAM CHEST 2 VIEWS: CPT | Mod: 26,,, | Performed by: RADIOLOGY

## 2024-08-15 PROCEDURE — 99213 OFFICE O/P EST LOW 20 MIN: CPT | Mod: PBBFAC,25,PO | Performed by: THORACIC SURGERY (CARDIOTHORACIC VASCULAR SURGERY)

## 2024-08-15 PROCEDURE — 99999 PR PBB SHADOW E&M-EST. PATIENT-LVL III: CPT | Mod: PBBFAC,,, | Performed by: THORACIC SURGERY (CARDIOTHORACIC VASCULAR SURGERY)

## 2024-08-15 PROCEDURE — 71046 X-RAY EXAM CHEST 2 VIEWS: CPT | Mod: TC,FY,PO

## 2024-08-15 RX ORDER — LISINOPRIL 2.5 MG/1
2.5 TABLET ORAL DAILY
COMMUNITY

## 2024-08-15 NOTE — PROGRESS NOTES
08/15/2024...    The patient is status post CABG x5/right carotid endarterectomy/ligation of the left atrial appendage 07/17/2024.  His postoperative course was smooth and progressive.  He remained in normal sinus rhythm for the duration.  He was discharged home on the 4th postoperative day.    The patient visits the office today accompanied by his sister.    He looks terrific! He is asking to go back to the gym.  He notices significant improvement in his exercise tolerance.  He has had no further rest dyspnea.    Today, I have encouraged him to liberalize his activity level, including enrolling in cardiac rehab.  There are strict sternal precautions for at least 6 more weeks.  I have emphasized the importance of this today.    On physical exam, breath sounds are clear and equal bilaterally.  The right CEA site is well healed.  There is no hematoma.  The sternotomy wound has healed nicely.  The sternal bone is solid.  The chest tube sites are sealed.  He has some lingering left thigh ecchymosis but there is no hematoma.  The EVH site has healed nicely without erythema or drainage.  He has no lower extremity edema.      CXR today is completely clear.    I reviewed the patient's medications with him.  He has resumed his lisinopril and his blood pressure is well controlled.    I will see the patient for his final checkup in 6-8 weeks   I emphasized the importance of continued sternal precautions  He will see Dr. Caballero soon.  Cardiac rehab should be initiated.

## 2024-08-23 PROBLEM — I25.10 CORONARY ARTERY DISEASE INVOLVING NATIVE CORONARY ARTERY OF NATIVE HEART WITHOUT ANGINA PECTORIS: Status: ACTIVE | Noted: 2024-08-23

## 2024-08-30 ENCOUNTER — PATIENT MESSAGE (OUTPATIENT)
Dept: NEUROLOGY | Facility: CLINIC | Age: 59
End: 2024-08-30
Payer: OTHER GOVERNMENT

## 2024-09-13 ENCOUNTER — TELEPHONE (OUTPATIENT)
Dept: VASCULAR SURGERY | Facility: CLINIC | Age: 59
End: 2024-09-13
Payer: OTHER GOVERNMENT

## 2024-09-13 NOTE — TELEPHONE ENCOUNTER
"Faxed to number listed.     ----- Message from Lory Wright MD sent at 9/13/2024  2:12 PM CDT -----  Regarding: Cardiac rehab  Aparna:    I did an "ORDERS ONLY" for cardiac rehab for this patient (Adarsh Wheeler).  I hope that is all I need to do!    Lory Wright  ----- Message -----  From: Anamaria Delgado  Sent: 9/13/2024   1:24 PM CDT  To: MD Brown Colón Tobery [facility] called : The  following  patient had a recent surgery and  need orders for Cardiac Rehab. Please fax to [facility] Fax : [phone] Phone : [phone] just ask for Jaime or Carlos       Also the [facility] has [facility] for his upcoming appointment.     Thank you,    Anamaria   [facility]  "

## 2024-09-23 ENCOUNTER — PATIENT MESSAGE (OUTPATIENT)
Dept: VASCULAR SURGERY | Facility: CLINIC | Age: 59
End: 2024-09-23
Payer: OTHER GOVERNMENT

## 2024-09-26 ENCOUNTER — OFFICE VISIT (OUTPATIENT)
Dept: VASCULAR SURGERY | Facility: CLINIC | Age: 59
End: 2024-09-26
Payer: OTHER GOVERNMENT

## 2024-09-26 VITALS
HEART RATE: 68 BPM | WEIGHT: 191.38 LBS | DIASTOLIC BLOOD PRESSURE: 84 MMHG | HEIGHT: 71 IN | BODY MASS INDEX: 26.79 KG/M2 | SYSTOLIC BLOOD PRESSURE: 124 MMHG

## 2024-09-26 DIAGNOSIS — Z95.1 HX OF CABG: Primary | ICD-10-CM

## 2024-09-26 PROCEDURE — 99024 POSTOP FOLLOW-UP VISIT: CPT | Mod: ,,, | Performed by: THORACIC SURGERY (CARDIOTHORACIC VASCULAR SURGERY)

## 2024-09-26 PROCEDURE — 99999 PR PBB SHADOW E&M-EST. PATIENT-LVL III: CPT | Mod: PBBFAC,,, | Performed by: THORACIC SURGERY (CARDIOTHORACIC VASCULAR SURGERY)

## 2024-09-26 PROCEDURE — 99213 OFFICE O/P EST LOW 20 MIN: CPT | Mod: PBBFAC,PO | Performed by: THORACIC SURGERY (CARDIOTHORACIC VASCULAR SURGERY)

## 2024-09-26 NOTE — PROGRESS NOTES
09/26/2024...    The patient is status post CABG x5 with a right CEA and ligation of the left atrial appendage 07/17/2024.  His postoperative course has been smooth and progressive.    The patient visits the office today for his final checkup.  He is accompanied by his sister.    He looks and feels terrific!  He has had some difficulty with the [facility] involve [facility].  This has been resolved recently.    His only complaint is that he sometimes experiences weakness.  He has good days and bad days.    I explained that this is completely normal.  And he is still fresh from his surgical procedures.    On physical exam, breath sounds are clear and equal bilaterally.  The sternotomy wound has healed completely.  The sternal bone is solid.  His left upper extremity is completely healed.  The left lower extremity EVH sites are also completely healed.  He has no lower extremity edema.    The patient will be discharged from the cardiac surgical clinic.  He will continue regular surveillance/follow-up with Dr. Caballero.

## 2024-12-18 ENCOUNTER — PATIENT MESSAGE (OUTPATIENT)
Dept: NEUROLOGY | Facility: CLINIC | Age: 59
End: 2024-12-18
Payer: OTHER GOVERNMENT

## (undated) DEVICE — SUT 2-0 12-18IN SILK

## (undated) DEVICE — SEE MEDLINE ITEM 156911

## (undated) DEVICE — GAUZE SPONGE PEANUT STRL

## (undated) DEVICE — COVER LIGHT HANDLE 80/CA

## (undated) DEVICE — SEE MEDLINE ITEM 157194

## (undated) DEVICE — SUT 3-0 12-18IN SILK

## (undated) DEVICE — SUT PROLENE 6-0 BV-1 30IN

## (undated) DEVICE — CATH ALL PUR URTHL RR 10FR

## (undated) DEVICE — INSERTS STEALTH FIBRA SIZE 1.

## (undated) DEVICE — BLADE SCALP OPHTL BEVEL STR

## (undated) DEVICE — HEMOSTAT SURGICEL 4X8IN

## (undated) DEVICE — FORCEP STRAIGHT DISP

## (undated) DEVICE — TOWEL OR DISP STRL BLUE 4/PK

## (undated) DEVICE — NDL HYPODERMIC 30GX1IN

## (undated) DEVICE — EVACUATOR WOUND BULB 100CC

## (undated) DEVICE — SET DECANTER MEDICHOICE

## (undated) DEVICE — APPLICATOR CHLORAPREP ORN 26ML

## (undated) DEVICE — SPONGE DERMACEA 4X4IN 12PLY

## (undated) DEVICE — LOOP VESSEL YELLOW MAXI

## (undated) DEVICE — ELECTRODE REM PLYHSV RETURN 9

## (undated) DEVICE — PACK DRAPE UNIVERSAL CONVERTOR

## (undated) DEVICE — CORD BIPOLAR 12 FOOT

## (undated) DEVICE — DRAPE STERI INSTRUMENT 1018

## (undated) DEVICE — GAUZE SPONGE 4X4 12PLY

## (undated) DEVICE — TRAY CATH FOL SIL URIMTR 16FR

## (undated) DEVICE — DRESSING TEGADERM IV 3.5 X 4.5

## (undated) DEVICE — DRESSING TELFA STRL 4X3 LF

## (undated) DEVICE — SPONGE GAUZE 16PLY 4X4

## (undated) DEVICE — TRAY PERIPHERAL VASCULAR OMC

## (undated) DEVICE — DRAPE INCISE IOBAN 2 23X17IN

## (undated) DEVICE — SUT 4-0 12-18IN SILK BLACK

## (undated) DEVICE — KIT EVACUATOR FULL PERF 100CC

## (undated) DEVICE — SYR 3CC LUER LOC

## (undated) DEVICE — DRESSING TRANS 4X4 TEGADERM

## (undated) DEVICE — DRAIN CHANNEL ROUND 15FR

## (undated) DEVICE — SUT SILK 3-0 SH 18IN BLACK

## (undated) DEVICE — TUBE PENROSE DRAIN 12IN X 5/8I

## (undated) DEVICE — SUT PROLENE 6-0 C-1 30IN BL

## (undated) DEVICE — SUT VICRYL 3-0 27 SH

## (undated) DEVICE — SPONGE WEC CEL SPEARS

## (undated) DEVICE — SUT LIGACLIP SMALL XTRA

## (undated) DEVICE — SUT MCRYL PLUS 4-0 PS2 27IN

## (undated) DEVICE — LOOP VESSEL BLUE MAXI

## (undated) DEVICE — BANDAGE GAUZE 6PLY FLUFF 2X3

## (undated) DEVICE — DRAPE T THYROID STERILE

## (undated) DEVICE — SEE MEDLINE ITEM 153688